# Patient Record
Sex: MALE | Race: BLACK OR AFRICAN AMERICAN
[De-identification: names, ages, dates, MRNs, and addresses within clinical notes are randomized per-mention and may not be internally consistent; named-entity substitution may affect disease eponyms.]

---

## 2017-05-15 ENCOUNTER — HOSPITAL ENCOUNTER (OUTPATIENT)
Dept: HOSPITAL 75 - RAD | Age: 37
End: 2017-05-15
Attending: NURSE PRACTITIONER
Payer: COMMERCIAL

## 2017-05-15 DIAGNOSIS — R05: Primary | ICD-10-CM

## 2017-05-15 DIAGNOSIS — R06.00: ICD-10-CM

## 2017-05-15 PROCEDURE — 71020: CPT

## 2017-05-15 NOTE — DIAGNOSTIC IMAGING REPORT
INDICATION: Cough and shortness of breath x1 week.



Comparison with 8/8/2011.



FINDINGS: Examination of the chest in the PA and lateral

projections fails to reveal evidence of active parenchymal

pathology or pleural effusion. The cardiac silhouette is normal.



IMPRESSION:

1. Negative chest.

2. No significant change since previous exam.



Dictated by: 



  Dictated on workstation # [id number]

## 2017-10-03 ENCOUNTER — HOSPITAL ENCOUNTER (EMERGENCY)
Dept: HOSPITAL 75 - ER | Age: 37
Discharge: HOME | End: 2017-10-03
Payer: COMMERCIAL

## 2017-10-03 VITALS — WEIGHT: 210 LBS | HEIGHT: 73 IN | BODY MASS INDEX: 27.83 KG/M2

## 2017-10-03 VITALS — DIASTOLIC BLOOD PRESSURE: 97 MMHG | SYSTOLIC BLOOD PRESSURE: 127 MMHG

## 2017-10-03 PROCEDURE — 99284 EMERGENCY DEPT VISIT MOD MDM: CPT

## 2017-10-03 PROCEDURE — 96372 THER/PROPH/DIAG INJ SC/IM: CPT

## 2017-10-03 NOTE — ED UPPER EXTREMITY
General


Chief Complaint:  Upper Extremity


Stated Complaint:  ARTHRITIS FLARE UP-WRIST PAIN


Source:  patient





History of Present Illness


Time seen by provider:  02:05


Initial Comments


PT C/O "RHEUMATOID FLARE" 


C/O RIGHT ANTERIOR WRIST PAIN SINCE THURSDAY 09/28/17


PAIN WAS MILD THROUGH WEEKEND


USED A  YESTERDAY


PAIN MUCH WORSE SINCE NOON TODAY,BUT WAS ABLE TO WORK ALL DAY ( WORKS AT [employer] ) 


NO PARESTHESIAS OR MOTOR DEFICITS





PT STATES HE WAS DX WITH R.A. A YEAR AGO, AND TAKES MELOXICAM DAILY FOR SYMPTOM 

CONTROL, AND USUALLY KEEPS IT FAIRLY WELL CONTROLLED.


RHEUMATOLOGIST: DR. NELSON





Allergies and Home Medications


Allergies


Coded Allergies:  


     No Known Drug Allergies (Unverified , 3/24/11)





Constitutional:  no symptoms reported


Musculoskeletal:  see HPI


Psychiatric/Neurological:  No Symptoms Reported





Past Medical-Social-Family Hx


Patient Social History


Alcohol Use:  Denies Use


Recreational Drug Use:  Yes


Drug of Choice:  THC


Smoking Status:  Current Everyday Smoker (1/2 PPD)


Type Used:  Cigarettes


Recent Foreign Travel:  No


Contact w/Someone Who Travel:  No





Surgeries


History of Surgeries:  No





Respiratory


History of Respiratory Disorde:  No





Cardiovascular


History of Cardiac Disorders:  No





Neurological


History of Neurological Disord:  No





Genitourinary


History of Genitourinary Disor:  No





Gastrointestinal


History of Gastrointestinal Di:  No





Musculoskeletal


History of Musculoskeletal Dis:  Yes


Musculoskeletal Disorders:  Rheumatoid Arthritis





Endocrine


History of Endocrine Disorders:  No





HEENT


History of HEENT Disorders:  No





Cancer


History of Cancer:  No





Psychosocial


History of Psychiatric Problem:  No





Integumentary


History of Skin or Integumenta:  No





Blood Transfusions


History of Blood Disorders:  No





Physical Exam


Vital Signs


Capillary Refill :


General Appearance:  WD/WN, no apparent distress


Elbow/Forearm:  normal inspection, non-tender, no evidence of injury, normal ROM


Wrist:  No bone tenderness, Yes limited ROM, Yes pain, Yes soft tissue 

tenderness (RIGHT ANTERIOR WRIST), Yes swelling (SLIGHT)


Hand:  normal inspection, non-tender, no evidence of injury, normal ROM


Neurologic/Tendon:  normal sensation, normal motor functions, normal tendon 

functions


Neurologic/Psychiatric:  CNs II-XII nml as tested, no motor/sensory deficits, 

alert, normal mood/affect, oriented x 3


Skin:  normal color, warm/dry





Progress/Results/Core Measures


Results/Orders


My Orders





Orders - AUDREY WILBURN DO


Methylprednisolone Sod Succ (Solu-Medrol (10/3/17 02:15)








Departure


Impression


Impression:  


 Primary Impression:  


 Right wrist pain


 Additional Impression:  


 Rheumatoid arthritis flare


Disposition:  01 HOME, SELF-CARE


Condition:  Stable





Departure-Patient Inst.


Referrals:  


ANNY BLACK MD (PCP/Family)


Primary Care Physician


Patient Instructions:  Rheumatoid Arthritis (DC)





Add. Discharge Instructions:  


ALTERNATE ICE AND HEAT TO AREA AT 20 MINUTE INTERVALS





HOLD MELOXICAM WHILE TAKING PREDNISONE





FOLLOW UP WITH DR. NELSON IN 2-3 DAYS IF NO BETTER





All discharge instructions reviewed with patient and/or family. Voiced 

understanding.


Scripts


Methylprednisolone (Medrol) 4 Mg Tab.ds.pk


4 MG PO UD, #1 PKG


   Prov: AUDREY WILBURN DO         10/3/17











AUDREY WILBURN DO Oct 3, 2017 02:17

## 2018-01-26 ENCOUNTER — HOSPITAL ENCOUNTER (OUTPATIENT)
Dept: HOSPITAL 75 - RAD | Age: 38
End: 2018-01-26
Attending: NURSE PRACTITIONER
Payer: COMMERCIAL

## 2018-01-26 DIAGNOSIS — R11.0: ICD-10-CM

## 2018-01-26 DIAGNOSIS — R42: ICD-10-CM

## 2018-01-26 DIAGNOSIS — R51: Primary | ICD-10-CM

## 2018-01-26 DIAGNOSIS — R63.4: ICD-10-CM

## 2018-01-26 LAB
ALBUMIN SERPL-MCNC: 4.5 GM/DL (ref 3.2–4.5)
ALP SERPL-CCNC: 50 U/L (ref 40–136)
ALT SERPL-CCNC: 20 U/L (ref 0–55)
BILIRUB SERPL-MCNC: 0.7 MG/DL (ref 0.1–1)
BUN/CREAT SERPL: 13
CALCIUM SERPL-MCNC: 9.8 MG/DL (ref 8.5–10.1)
CHLORIDE SERPL-SCNC: 104 MMOL/L (ref 98–107)
CO2 SERPL-SCNC: 25 MMOL/L (ref 21–32)
CREAT SERPL-MCNC: 0.91 MG/DL (ref 0.6–1.3)
GFR SERPLBLD BASED ON 1.73 SQ M-ARVRAT: > 60 ML/MIN
GLUCOSE SERPL-MCNC: 98 MG/DL (ref 70–105)
POTASSIUM SERPL-SCNC: 4.4 MMOL/L (ref 3.6–5)
PROT SERPL-MCNC: 7.6 GM/DL (ref 6.4–8.2)
SODIUM SERPL-SCNC: 139 MMOL/L (ref 135–145)

## 2018-01-26 PROCEDURE — 80053 COMPREHEN METABOLIC PANEL: CPT

## 2018-01-26 PROCEDURE — 36415 COLL VENOUS BLD VENIPUNCTURE: CPT

## 2018-01-26 PROCEDURE — 70553 MRI BRAIN STEM W/O & W/DYE: CPT

## 2018-01-26 NOTE — DIAGNOSTIC IMAGING REPORT
CLINICAL INDICATION: Patient states he had a couple episodes of

headaches, nausea and dizziness for the last several days at a

time. Patient has joint pain and weight loss.



Exam: MRI of the brain performed without and with 9 cc of

Gadavist  IV contrast. Sequences include axial DWI, ADC map,

coronal gradient echo, axial T2, axial FLAIR, axial T1, axial T1

post IV contrast, coronal T1 fat-sat post IV contrast, and

sagittal T1 post IV contrast.



Comparison: MRI of the brain performed without and with IV

contrast dated 8/8/2011.



Findings:

There is no evidence of acute cerebral infarct, intracranial

hemorrhage, or gross mass effect. 



There is normal gray-white matter distinction. The brain

parenchymal volume appears appropriate for patient's age. There

is no significant midline shift or herniation. 



The [place] vascular structures show no gross

abnormality as visualized. The pituitary gland, sella, and

suprasellar regions are unremarkable as visualized. There is no

evidence of hydrocephalus. The basal cisterns are unremarkable.

The skull, extracranial soft tissue, and orbits are unremarkable.

The paranasal sinuses are unremarkable.



IMPRESSION:

Unremarkable MRI of the brain.



Dictated by: 



  Dictated on workstation # [id number]

## 2018-02-02 ENCOUNTER — HOSPITAL ENCOUNTER (EMERGENCY)
Dept: HOSPITAL 75 - ER | Age: 38
Discharge: HOME | End: 2018-02-02
Payer: COMMERCIAL

## 2018-02-02 VITALS — BODY MASS INDEX: 27.3 KG/M2 | WEIGHT: 206 LBS | HEIGHT: 73 IN

## 2018-02-02 VITALS — DIASTOLIC BLOOD PRESSURE: 75 MMHG | SYSTOLIC BLOOD PRESSURE: 111 MMHG

## 2018-02-02 DIAGNOSIS — R10.13: ICD-10-CM

## 2018-02-02 DIAGNOSIS — R11.0: Primary | ICD-10-CM

## 2018-02-02 DIAGNOSIS — R63.4: ICD-10-CM

## 2018-02-02 DIAGNOSIS — F12.10: ICD-10-CM

## 2018-02-02 DIAGNOSIS — F17.200: ICD-10-CM

## 2018-02-02 DIAGNOSIS — M06.9: ICD-10-CM

## 2018-02-02 LAB
ALBUMIN SERPL-MCNC: 4.1 GM/DL (ref 3.2–4.5)
ALP SERPL-CCNC: 47 U/L (ref 40–136)
ALT SERPL-CCNC: 19 U/L (ref 0–55)
APTT PPP: YELLOW S
BACTERIA #/AREA URNS HPF: (no result) /HPF
BASOPHILS # BLD AUTO: 0 10^3/UL (ref 0–0.1)
BASOPHILS NFR BLD AUTO: 0 % (ref 0–10)
BILIRUB SERPL-MCNC: 0.5 MG/DL (ref 0.1–1)
BILIRUB UR QL STRIP: NEGATIVE
BUN/CREAT SERPL: 12
CALCIUM SERPL-MCNC: 9.4 MG/DL (ref 8.5–10.1)
CHLORIDE SERPL-SCNC: 107 MMOL/L (ref 98–107)
CO2 SERPL-SCNC: 23 MMOL/L (ref 21–32)
CREAT SERPL-MCNC: 0.77 MG/DL (ref 0.6–1.3)
EOSINOPHIL # BLD AUTO: 0 10^3/UL (ref 0–0.3)
EOSINOPHIL NFR BLD AUTO: 1 % (ref 0–10)
ERYTHROCYTE [DISTWIDTH] IN BLOOD BY AUTOMATED COUNT: 14.1 % (ref 10–14.5)
ERYTHROCYTE [SEDIMENTATION RATE] IN BLOOD: 5 MM/HR (ref 0–15)
FIBRINOGEN PPP-MCNC: CLEAR MG/DL
GFR SERPLBLD BASED ON 1.73 SQ M-ARVRAT: > 60 ML/MIN
GLUCOSE SERPL-MCNC: 94 MG/DL (ref 70–105)
GLUCOSE UR STRIP-MCNC: NEGATIVE MG/DL
HCT VFR BLD CALC: 42 % (ref 40–54)
HGB BLD-MCNC: 14.6 G/DL (ref 13.3–17.7)
KETONES UR QL STRIP: NEGATIVE
LEUKOCYTE ESTERASE UR QL STRIP: NEGATIVE
LYMPHOCYTES # BLD AUTO: 1.3 X 10^3 (ref 1–4)
LYMPHOCYTES NFR BLD AUTO: 32 % (ref 12–44)
MANUAL DIFFERENTIAL PERFORMED BLD QL: NO
MCH RBC QN AUTO: 30 PG (ref 25–34)
MCHC RBC AUTO-ENTMCNC: 35 G/DL (ref 32–36)
MCV RBC AUTO: 86 FL (ref 80–99)
MONOCYTES # BLD AUTO: 0.4 X 10^3 (ref 0–1)
MONOCYTES NFR BLD AUTO: 8 % (ref 0–12)
NEUTROPHILS # BLD AUTO: 2.5 X 10^3 (ref 1.8–7.8)
NEUTROPHILS NFR BLD AUTO: 59 % (ref 42–75)
NITRITE UR QL STRIP: NEGATIVE
PH UR STRIP: 6.5 [PH] (ref 5–9)
PLATELET # BLD: 174 10^3/UL (ref 130–400)
PMV BLD AUTO: 10.3 FL (ref 7.4–10.4)
POTASSIUM SERPL-SCNC: 3.8 MMOL/L (ref 3.6–5)
PROT SERPL-MCNC: 7 GM/DL (ref 6.4–8.2)
PROT UR QL STRIP: NEGATIVE
RBC # BLD AUTO: 4.86 10^6/UL (ref 4.35–5.85)
RBC #/AREA URNS HPF: (no result) /HPF
SODIUM SERPL-SCNC: 138 MMOL/L (ref 135–145)
SP GR UR STRIP: 1 (ref 1.02–1.02)
SQUAMOUS #/AREA URNS HPF: (no result) /HPF
T4 FREE SERPL-MCNC: 0.91 NG/DL (ref 0.7–1.48)
UROBILINOGEN UR-MCNC: NORMAL MG/DL
WBC # BLD AUTO: 4.3 10^3/UL (ref 4.3–11)
WBC #/AREA URNS HPF: (no result) /HPF

## 2018-02-02 PROCEDURE — 74160 CT ABDOMEN W/CONTRAST: CPT

## 2018-02-02 PROCEDURE — 96361 HYDRATE IV INFUSION ADD-ON: CPT

## 2018-02-02 PROCEDURE — 80053 COMPREHEN METABOLIC PANEL: CPT

## 2018-02-02 PROCEDURE — 81000 URINALYSIS NONAUTO W/SCOPE: CPT

## 2018-02-02 PROCEDURE — 96374 THER/PROPH/DIAG INJ IV PUSH: CPT

## 2018-02-02 PROCEDURE — 70491 CT SOFT TISSUE NECK W/DYE: CPT

## 2018-02-02 PROCEDURE — 86141 C-REACTIVE PROTEIN HS: CPT

## 2018-02-02 PROCEDURE — 84443 ASSAY THYROID STIM HORMONE: CPT

## 2018-02-02 PROCEDURE — 84439 ASSAY OF FREE THYROXINE: CPT

## 2018-02-02 PROCEDURE — 36415 COLL VENOUS BLD VENIPUNCTURE: CPT

## 2018-02-02 PROCEDURE — 85025 COMPLETE CBC W/AUTO DIFF WBC: CPT

## 2018-02-02 PROCEDURE — 71260 CT THORAX DX C+: CPT

## 2018-02-02 PROCEDURE — 85652 RBC SED RATE AUTOMATED: CPT

## 2018-02-02 NOTE — ED ABDOMINAL PAIN
General


Chief Complaint:  Abdominal/GI Problems


Stated Complaint:  LOSS OF APPETITE/THROAT DISCOMFORT


Nursing Triage Note:  


PT CO OF NO APPETITE, NAUSEA X3 WEEKS,FELT LIKE HAS HAD SOMETHING IN THROAT FOR 


2 DAYS, HAS LOST 20# SINCE OCT AND IS NOT TRYING TO LOOSE WT


Sepsis Screen:  No Definite Risk


Source of Information:  Patient


Exam Limitations:  No Limitations (ligated joining)





History of Present Illness


Date Seen by Provider:  2018


Time Seen by Provider:  19:27


Initial Comments


To ER by mother with reports of an intentional weight loss since October 

totaling about 20 pounds. Nausea and epigastric fullness for 3 weeks. Sensation 

of some things stuck in his throat for about 2 days. Denies fevers. Denies 

bowel changes. Does report "tightness" in his upper abdomen. States he was 

recently diagnosed with rheumatoid arthritis about 3 days ago following 

evaluation for joint pains. He has not yet started any treatment for this 

because the medicine that they prescribed had a side effect listed of nausea. 

She was are dealing with nausea he didn't want add to that. Has been on 

meloxicam daily for over a year until about 2-3 weeks ago he stopped it.


Timing/Duration:  Getting Worse, Other


Severity/Quality:  Moderate


Radiation:  No Radiation


Activities at Onset:  None


Associated Symptoms:  Nausea/Vomiting





Allergies and Home Medications


Allergies


Coded Allergies:  


     No Known Drug Allergies (Unverified , 3/24/11)





Review of Systems


Constitutional:  see HPI


EENTM:  No Symptoms Reported


Respiratory:  No Symptoms Reported


Cardiovascular:  No Symptoms Reported


Gastrointestinal:  See HPI, Abdominal Pain, Nausea


Genitourinary:  No Symptoms Reported


Musculoskeletal:  no symptoms reported


Skin:  no symptoms reported


Psychiatric/Neurological:  No Symptoms Reported





Past Medical-Social-Family Hx


Patient Social History


Alcohol Use:  Denies Use


Recreational Drug Use:  Yes


Drug of Choice:  THC


Type Used:  Cigarettes


Recent Foreign Travel:  No


Contact w/Someone Who Travel:  No


Recent Infectious Disease Expo:  No


Recent Hopitalizations:  No


Physical Abuse:  No


Sexual Abuse:  No





Surgeries


History of Surgeries:  No





Respiratory


History of Respiratory Disorde:  No





Cardiovascular


History of Cardiac Disorders:  No





Neurological


History of Neurological Disord:  No





Genitourinary


History of Genitourinary Disor:  No





Gastrointestinal


History of Gastrointestinal Di:  No





Musculoskeletal


History of Musculoskeletal Dis:  Yes


Musculoskeletal Disorders:  Rheumatoid Arthritis





Endocrine


History of Endocrine Disorders:  No





HEENT


History of HEENT Disorders:  No





Cancer


History of Cancer:  No





Psychosocial


History of Psychiatric Problem:  No


Suicide Risk Score:  0





Integumentary


History of Skin or Integumenta:  No





Blood Transfusions


History of Blood Disorders:  No





Physical Exam


Vital Signs





 VS - Last 72 Hours, by Label








 18





 18:45


 


Temp 97.7


 


Pulse 98


 


Resp 18


 


B/P (MAP) 135/90 (105)


 


Pulse Ox 98





Capillary Refill : Less Than 3 Seconds


General Appearance:  WD/WN, no apparent distress


HEENT:  PERRL/EOMI, normal ENT inspection


Neck:  non-tender, full range of motion


Respiratory:  no respiratory distress, no accessory muscle use


Cardiovascular:  regular rate, rhythm, no murmur


Gastrointestinal:  normal bowel sounds, non tender, soft


Neurologic/Psychiatric:  alert, normal mood/affect, oriented x 3


Skin:  normal color, warm/dry





Progress/Results/Core Measures


Results/Orders


Lab Results





Laboratory Tests








Test


  18


20:36 18


20:55 Range/Units


 


 


White Blood Count


  4.3 


  


  4.3-11.0


10^3/uL


 


Red Blood Count


  4.86 


  


  4.35-5.85


10^6/uL


 


Hemoglobin 14.6   13.3-17.7  G/DL


 


Hematocrit 42   40-54  %


 


Mean Corpuscular Volume 86   80-99  FL


 


Mean Corpuscular Hemoglobin 30   25-34  PG


 


Mean Corpuscular Hemoglobin


Concent 35 


  


  32-36  G/DL


 


 


Red Cell Distribution Width 14.1   10.0-14.5  %


 


Platelet Count


  174 


  


  130-400


10^3/uL


 


Mean Platelet Volume 10.3   7.4-10.4  FL


 


Neutrophils (%) (Auto) 59   42-75  %


 


Lymphocytes (%) (Auto) 32   12-44  %


 


Monocytes (%) (Auto) 8   0-12  %


 


Eosinophils (%) (Auto) 1   0-10  %


 


Basophils (%) (Auto) 0   0-10  %


 


Neutrophils # (Auto) 2.5   1.8-7.8  X 10^3


 


Lymphocytes # (Auto) 1.3   1.0-4.0  X 10^3


 


Monocytes # (Auto) 0.4   0.0-1.0  X 10^3


 


Eosinophils # (Auto)


  0.0 


  


  0.0-0.3


10^3/uL


 


Basophils # (Auto)


  0.0 


  


  0.0-0.1


10^3/uL


 


Sodium Level 138   135-145  MMOL/L


 


Potassium Level 3.8   3.6-5.0  MMOL/L


 


Chloride Level 107     MMOL/L


 


Carbon Dioxide Level 23   21-32  MMOL/L


 


Anion Gap 8   5-14  MMOL/L


 


Blood Urea Nitrogen 9   7-18  MG/DL


 


Creatinine


  0.77 


  


  0.60-1.30


MG/DL


 


Estimat Glomerular Filtration


Rate > 60 


  


   


 


 


BUN/Creatinine Ratio 12    


 


Glucose Level 94     MG/DL


 


Calcium Level 9.4   8.5-10.1  MG/DL


 


Total Bilirubin 0.5   0.1-1.0  MG/DL


 


Aspartate Amino Transf


(AST/SGOT) 15 


  


  5-34  U/L


 


 


Alanine Aminotransferase


(ALT/SGPT) 19 


  


  0-55  U/L


 


 


Alkaline Phosphatase 47     U/L


 


C-Reactive Protein High


Sensitivity 0.30 


  


  0.00-0.50


MG/DL


 


Total Protein 7.0   6.4-8.2  GM/DL


 


Albumin 4.1   3.2-4.5  GM/DL


 


Thyroid Stimulating Hormone


(TSH) 0.89 


  


  0.35-4.94


UIU/ML


 


Free Thyroxine


  0.91 


  


  0.70-1.48


NG/DL


 


Urine Color  YELLOW   


 


Urine Clarity  CLEAR   


 


Urine pH  6.5  5-9  


 


Urine Specific Gravity  1.005 L 1.016-1.022  


 


Urine Protein  NEGATIVE  NEGATIVE  


 


Urine Glucose (UA)  NEGATIVE  NEGATIVE  


 


Urine Ketones  NEGATIVE  NEGATIVE  


 


Urine Nitrite  NEGATIVE  NEGATIVE  


 


Urine Bilirubin  NEGATIVE  NEGATIVE  


 


Urine Urobilinogen  NORMAL  NORMAL  MG/DL


 


Urine Leukocyte Esterase  NEGATIVE  NEGATIVE  


 


Urine RBC (Auto)  NEGATIVE  NEGATIVE  


 


Urine RBC  NONE   /HPF


 


Urine WBC  NONE   /HPF


 


Urine Squamous Epithelial


Cells 


  RARE 


   /HPF


 


 


Urine Crystals  NONE   /LPF


 


Urine Bacteria  NONE   /HPF


 


Urine Casts  NONE   /LPF


 


Urine Mucus  NEGATIVE   /LPF


 


Urine Culture Indicated  NO   








My Orders





Orders - CLIFTON HALE


Influenza A And B Antigens (18 18:46)


Cbc With Automated Diff (18 19:03)


Comprehensive Metabolic Panel (18 19:03)


Ua Culture If Indicated (18 19:03)


Saline Lock/Iv-Start (18 19:03)


Thyroid Stimulating Hormone (18 19:03)


Free T4 (Free Thyroxine) (18 19:03)


Ns Iv 1000 Ml (Sodium Chloride 0.9%) (18 19:15)


Erythrocyte Sedimentation Rate (18 19:30)


Hs C Reactive Protein (18 19:30)


Ondansetron Injection (Zofran Injectio (18 19:30)


Iohexol Injection (Omnipaque 350 Mg/Ml 1 (18 19:45)


Pharmacy Communication (Pharmacy Communi (18 19:31)


Ns (Ivpb) (Sodium Chloride 0.9%) (18 19:45)


Ct Neck/Chest/Abdomen W (18 19:26)


Antacid  Suspension (Mylanta  Suspension (18 21:30)


Lidocaine 2% Viscous 15 Ml (Xylocaine Vi (18 21:30)





Medications Given in ED





Current Medications








 Medications  Dose


 Ordered  Sig/Jessica


 Route  Start Time


 Stop Time Status Last Admin


Dose Admin


 


 Iohexol  100 ml  ONCE  ONCE


 IV  18 19:45


 18 19:46 DC 18 20:45


100 ML


 


 Ondansetron HCl  4 mg  ONCE  ONCE


 IVP  18 19:30


 18 19:31 DC 18 20:29


4 MG


 


 Sodium Chloride  250 ml  ONCE  ONCE


 IV  18 19:45


 18 19:46 DC 18 20:45


80 ML








Vital Signs/I&O





Vital Sign - Last 12Hours








 18





 18:45


 


Temp 97.7


 


Pulse 98


 


Resp 18


 


B/P (MAP) 135/90 (105)


 


Pulse Ox 98














Blood Pressure Mean:  105











Diagnostic Imaging





   Diagonstic Imaging:  CT


Comments


NAME:   KEV LEPE


[facility] REC#:   [id number]


ACCOUNT#:   [id number]


PT STATUS:   REG ER


:   1980


PHYSICIAN:   CLIFTON HALE APRN


ADMIT DATE:   18/ER


 ***Draft***


Date of Exam:18





CT NECK/CHEST/ABDOMEN W








INDICATION: Nausea for 3 weeks with dysphagia and weight loss





Multiple contiguous axial CT images of the neck, chest and


abdomen are obtained after intravenous administration of


iodinated contrast.





CT neck:





Great vessels in the neck appear patent. There are subcentimeter


cervical lymph nodes which are distributed symmetrically,


bilaterally. No evidence of dominant mass or fluid collection is


seen. Parotid and submandibular salivary glands have a normal


appearance as does the thyroid gland.





IMPRESSION: No neck abnormality is appreciated.





CT thorax:





Lungs are clear, bilaterally. There is no evidence of mass or


infiltrate. No significant pleural or pericardial fluid is


identified. No pathologic adenopathy is seen in the thorax. No


osseous abnormality is detected.





IMPRESSION: Unremarkable CT of the thorax.





CT abdomen:





There is no evidence of focal hepatic or splenic abnormality.


Pancreas, adrenal glands and kidneys are unremarkable. No


gallbladder or renal abnormality is seen. There is no free fluid


or evidence of pathologic adenopathy. Minimal atherosclerotic


disease is present.





IMPRESSION: No acute abnormalities identified and there is no


evidence of abdominal mass.





  Dictated on workstation # [id number]








Dict:   [id number]


Trans:   18


IBETH [id number]





Interpreted by:     LEVY MAHONEY MD


Electronically signed by:





Departure


Communication (Admissions)


Progress Notes


-patient is feeling somewhat better at this time, no longer nauseous. He 

does suggest that may be the discomfort or annoyance rather in his throat is a 

factor of a pill which he felt as though got stuck. He took one of his Prilosec 

tablets and thinks he might not of had enough water with it and it may of sat 

there in his throat for a while. We will try GI cocktail. His stomach 

discomfort may be from meloxicam induced gastritis/peptic ulcer disease and 

weight loss may be a factor of the rheumatoid arthritis recently diagnosed.





Impression


Impression:  


 Primary Impression:  


 Nausea alone


 Additional Impressions:  


 Epigastric discomfort


 Weight loss


Disposition:  01 HOME, SELF-CARE


Condition:  Stable





Departure-Patient Inst.


Decision time for Depature:  21:28


Referrals:  


ANNY BLACK MD (PCP/Family)


Primary Care Physician





Add. Discharge Instructions:  


1. Start the Prilosec and the hydroxychloroquine


2. Return to ER for worsening symptoms


3. Follow-up with Dr. Black next week.





Copy


Copies To 1:   ANNY BLACK MD, PETER J APRN 2018 19:30

## 2018-02-02 NOTE — DIAGNOSTIC IMAGING REPORT
INDICATION: Nausea for 3 weeks with dysphagia and weight loss



Multiple contiguous axial CT images of the neck, chest and

abdomen are obtained after intravenous administration of

iodinated contrast.



CT neck:



Great vessels in the neck appear patent. There are subcentimeter

cervical lymph nodes which are distributed symmetrically,

bilaterally. No evidence of dominant mass or fluid collection is

seen. Parotid and submandibular salivary glands have a normal

appearance as does the thyroid gland.



IMPRESSION: No neck abnormality is appreciated.



CT thorax:



Lungs are clear, bilaterally. There is no evidence of mass or

infiltrate. No significant pleural or pericardial fluid is

identified. No pathologic adenopathy is seen in the thorax. No

osseous abnormality is detected.



IMPRESSION: Unremarkable CT of the thorax.



CT abdomen:



There is no evidence of focal hepatic or splenic abnormality.

Pancreas, adrenal glands and kidneys are unremarkable. No

gallbladder or renal abnormality is seen. There is no free fluid

or evidence of pathologic adenopathy. Minimal atherosclerotic

disease is present.



IMPRESSION: No acute abnormalities identified and there is no

evidence of abdominal mass.



Dictated by: 



  Dictated on workstation # [id number]

## 2018-02-27 ENCOUNTER — HOSPITAL ENCOUNTER (OUTPATIENT)
Dept: HOSPITAL 75 - PREOP | Age: 38
End: 2018-02-27
Attending: SURGERY
Payer: COMMERCIAL

## 2018-02-27 DIAGNOSIS — K21.9: ICD-10-CM

## 2018-02-27 DIAGNOSIS — Z01.818: Primary | ICD-10-CM

## 2018-03-02 ENCOUNTER — HOSPITAL ENCOUNTER (EMERGENCY)
Dept: HOSPITAL 75 - ER | Age: 38
Discharge: HOME | End: 2018-03-02
Payer: COMMERCIAL

## 2018-03-02 VITALS — BODY MASS INDEX: 27.04 KG/M2 | HEIGHT: 73 IN | WEIGHT: 204 LBS

## 2018-03-02 VITALS — DIASTOLIC BLOOD PRESSURE: 79 MMHG | SYSTOLIC BLOOD PRESSURE: 118 MMHG

## 2018-03-02 DIAGNOSIS — R10.13: Primary | ICD-10-CM

## 2018-03-02 DIAGNOSIS — M06.9: ICD-10-CM

## 2018-03-02 DIAGNOSIS — F12.90: ICD-10-CM

## 2018-03-02 LAB
ALBUMIN SERPL-MCNC: 4.5 GM/DL (ref 3.2–4.5)
ALP SERPL-CCNC: 44 U/L (ref 40–136)
ALT SERPL-CCNC: 20 U/L (ref 0–55)
APTT PPP: YELLOW S
BACTERIA #/AREA URNS HPF: (no result) /HPF
BASOPHILS # BLD AUTO: 0 10^3/UL (ref 0–0.1)
BASOPHILS NFR BLD AUTO: 0 % (ref 0–10)
BILIRUB SERPL-MCNC: 0.5 MG/DL (ref 0.1–1)
BILIRUB UR QL STRIP: NEGATIVE
BUN/CREAT SERPL: 12
CALCIUM SERPL-MCNC: 9.8 MG/DL (ref 8.5–10.1)
CHLORIDE SERPL-SCNC: 108 MMOL/L (ref 98–107)
CO2 SERPL-SCNC: 22 MMOL/L (ref 21–32)
CREAT SERPL-MCNC: 0.77 MG/DL (ref 0.6–1.3)
EOSINOPHIL # BLD AUTO: 0 10^3/UL (ref 0–0.3)
EOSINOPHIL NFR BLD AUTO: 1 % (ref 0–10)
ERYTHROCYTE [DISTWIDTH] IN BLOOD BY AUTOMATED COUNT: 13.7 % (ref 10–14.5)
FIBRINOGEN PPP-MCNC: CLEAR MG/DL
GFR SERPLBLD BASED ON 1.73 SQ M-ARVRAT: > 60 ML/MIN
GLUCOSE SERPL-MCNC: 89 MG/DL (ref 70–105)
GLUCOSE UR STRIP-MCNC: NEGATIVE MG/DL
HCT VFR BLD CALC: 46 % (ref 40–54)
HGB BLD-MCNC: 16.1 G/DL (ref 13.3–17.7)
KETONES UR QL STRIP: NEGATIVE
LEUKOCYTE ESTERASE UR QL STRIP: NEGATIVE
LIPASE SERPL-CCNC: 54 U/L (ref 8–78)
LYMPHOCYTES # BLD AUTO: 1.7 X 10^3 (ref 1–4)
LYMPHOCYTES NFR BLD AUTO: 36 % (ref 12–44)
MANUAL DIFFERENTIAL PERFORMED BLD QL: NO
MCH RBC QN AUTO: 30 PG (ref 25–34)
MCHC RBC AUTO-ENTMCNC: 35 G/DL (ref 32–36)
MCV RBC AUTO: 85 FL (ref 80–99)
MONOCYTES # BLD AUTO: 0.4 X 10^3 (ref 0–1)
MONOCYTES NFR BLD AUTO: 9 % (ref 0–12)
NEUTROPHILS # BLD AUTO: 2.4 X 10^3 (ref 1.8–7.8)
NEUTROPHILS NFR BLD AUTO: 54 % (ref 42–75)
NITRITE UR QL STRIP: NEGATIVE
PH UR STRIP: 6.5 [PH] (ref 5–9)
PLATELET # BLD: 179 10^3/UL (ref 130–400)
PMV BLD AUTO: 9.6 FL (ref 7.4–10.4)
POTASSIUM SERPL-SCNC: 3.9 MMOL/L (ref 3.6–5)
PROT SERPL-MCNC: 7.3 GM/DL (ref 6.4–8.2)
PROT UR QL STRIP: NEGATIVE
RBC # BLD AUTO: 5.38 10^6/UL (ref 4.35–5.85)
RBC #/AREA URNS HPF: (no result) /HPF
SODIUM SERPL-SCNC: 141 MMOL/L (ref 135–145)
SP GR UR STRIP: 1 (ref 1.02–1.02)
SQUAMOUS #/AREA URNS HPF: (no result) /HPF
UROBILINOGEN UR-MCNC: NORMAL MG/DL
WBC # BLD AUTO: 4.5 10^3/UL (ref 4.3–11)
WBC #/AREA URNS HPF: (no result) /HPF

## 2018-03-02 PROCEDURE — 36415 COLL VENOUS BLD VENIPUNCTURE: CPT

## 2018-03-02 PROCEDURE — 85025 COMPLETE CBC W/AUTO DIFF WBC: CPT

## 2018-03-02 PROCEDURE — 76705 ECHO EXAM OF ABDOMEN: CPT

## 2018-03-02 PROCEDURE — 81000 URINALYSIS NONAUTO W/SCOPE: CPT

## 2018-03-02 PROCEDURE — 80053 COMPREHEN METABOLIC PANEL: CPT

## 2018-03-02 PROCEDURE — 96360 HYDRATION IV INFUSION INIT: CPT

## 2018-03-02 PROCEDURE — 83690 ASSAY OF LIPASE: CPT

## 2018-03-02 NOTE — ED ABDOMINAL PAIN
General


Chief Complaint:  Abdominal/GI Problems


Stated Complaint:  N/V


Nursing Triage Note:  


pt complaint of nausea x 7-10 weeks. pt states seen multiple doctors for this. 


pt now taking meds for his RA and nausea


Sepsis Screen:  No Definite Risk


Source of Information:  Patient


Exam Limitations:  No Limitations





History of Present Illness


Date Seen by Provider:  Mar 2, 2018


Time Seen by Provider:  15:16


Initial Comments


This 37-year-old male presents with a complaint of a 60 pound weight loss and 

persistent nausea vomiting diarrhea and abdominal pain for the last 9 months.  

The patient denies any hematemesis or black or tarry stools.  He has been on 

ulcer medicine without improvement.  He has recently diagnosed rheumatoid 

arthritis for which she was on meloxicam and subsequently Plaquenil.  Patient's 

joint pains have decreased.  His abdominal complaints continue.





The patient is under the care of Dr. Huber.  He is scheduled to see Dr. Mendes for an EGD early next week.





Allergies and Home Medications


Allergies


Coded Allergies:  


     No Known Drug Allergies (Unverified , 3/24/11)





Patient Home Medication List


Home Medication List Reviewed:  Yes





Review of Systems


Constitutional:  No chills


EENTM:  No Blurred Vision


Respiratory:  Denies Cough


Cardiovascular:  Denies Chest Pain


Gastrointestinal:  Abdominal Pain, Diarrhea, Nausea, Denies Rectal Bleeding, 

Vomiting


Genitourinary:  No Symptoms Reported


Musculoskeletal:  no symptoms reported


Skin:  no symptoms reported


Psychiatric/Neurological:  No Symptoms Reported


Endocrine:  No Symptoms Reported


Hematologic/Lymphatic:  No Symptoms Reported





Past Medical-Social-Family Hx


Patient Social History


Drug of Choice:  THC


Type Used:  Cigarettes


Recent Foreign Travel:  No


Contact w/Someone Who Travel:  No


Recent Infectious Disease Expo:  No


Recent Hopitalizations:  No





Surgeries


History of Surgeries:  No





Respiratory


History of Respiratory Disorde:  No





Cardiovascular


History of Cardiac Disorders:  No





Neurological


History of Neurological Disord:  No





Genitourinary


History of Genitourinary Disor:  No





Gastrointestinal


History of Gastrointestinal Di:  No





Musculoskeletal


History of Musculoskeletal Dis:  Yes


Musculoskeletal Disorders:  Rheumatoid Arthritis





Endocrine


History of Endocrine Disorders:  No





HEENT


History of HEENT Disorders:  No





Cancer


History of Cancer:  No





Psychosocial


History of Psychiatric Problem:  No





Integumentary


History of Skin or Integumenta:  No





Blood Transfusions


History of Blood Disorders:  No





Reviewed Nursing Assessment


Reviewed/Agree w Nursing PMH:  Yes





Physical Exam


Vital Signs





 VS - Last 72 Hours, by Label








 3/2/18





 14:41


 


Temp 97.9


 


Pulse 76


 


Resp 20


 


B/P (MAP) 151/95 (113)


 


Pulse Ox 100


 


O2 Delivery Room Air





Capillary Refill : Less Than 3 Seconds


General Appearance:  WD/WN, no apparent distress


Neck:  full range of motion


Respiratory:  lungs clear, normal breath sounds


Cardiovascular:  normal peripheral pulses, regular rate, rhythm


Gastrointestinal:  normal bowel sounds, non tender, soft


Extremities:  normal range of motion, non-tender, normal inspection


Back:  normal inspection


Neurologic/Psychiatric:  no motor/sensory deficits, alert, normal mood/affect


Skin:  normal color, warm/dry





Progress/Results/Core Measures


Results/Orders


Lab Results





Laboratory Tests








Test


  3/2/18


17:00 3/2/18


17:25 Range/Units


 


 


White Blood Count


  4.5 


  


  4.3-11.0


10^3/uL


 


Red Blood Count


  5.38 


  


  4.35-5.85


10^6/uL


 


Hemoglobin 16.1   13.3-17.7  G/DL


 


Hematocrit 46   40-54  %


 


Mean Corpuscular Volume 85   80-99  FL


 


Mean Corpuscular Hemoglobin 30   25-34  PG


 


Mean Corpuscular Hemoglobin


Concent 35 


  


  32-36  G/DL


 


 


Red Cell Distribution Width 13.7   10.0-14.5  %


 


Platelet Count


  179 


  


  130-400


10^3/uL


 


Mean Platelet Volume 9.6   7.4-10.4  FL


 


Neutrophils (%) (Auto) 54   42-75  %


 


Lymphocytes (%) (Auto) 36   12-44  %


 


Monocytes (%) (Auto) 9   0-12  %


 


Eosinophils (%) (Auto) 1   0-10  %


 


Basophils (%) (Auto) 0   0-10  %


 


Neutrophils # (Auto) 2.4   1.8-7.8  X 10^3


 


Lymphocytes # (Auto) 1.7   1.0-4.0  X 10^3


 


Monocytes # (Auto) 0.4   0.0-1.0  X 10^3


 


Eosinophils # (Auto)


  0.0 


  


  0.0-0.3


10^3/uL


 


Basophils # (Auto)


  0.0 


  


  0.0-0.1


10^3/uL


 


Sodium Level 141   135-145  MMOL/L


 


Potassium Level 3.9   3.6-5.0  MMOL/L


 


Chloride Level 108 H    MMOL/L


 


Carbon Dioxide Level 22   21-32  MMOL/L


 


Anion Gap 11   5-14  MMOL/L


 


Blood Urea Nitrogen 9   7-18  MG/DL


 


Creatinine


  0.77 


  


  0.60-1.30


MG/DL


 


Estimat Glomerular Filtration


Rate > 60 


  


   


 


 


BUN/Creatinine Ratio 12    


 


Glucose Level 89     MG/DL


 


Calcium Level 9.8   8.5-10.1  MG/DL


 


Total Bilirubin 0.5   0.1-1.0  MG/DL


 


Aspartate Amino Transf


(AST/SGOT) 15 


  


  5-34  U/L


 


 


Alanine Aminotransferase


(ALT/SGPT) 20 


  


  0-55  U/L


 


 


Alkaline Phosphatase 44     U/L


 


Total Protein 7.3   6.4-8.2  GM/DL


 


Albumin 4.5   3.2-4.5  GM/DL


 


Lipase 54   8-78  U/L


 


Urine Color  YELLOW   


 


Urine Clarity  CLEAR   


 


Urine pH  6.5  5-9  


 


Urine Specific Gravity  1.005 L 1.016-1.022  


 


Urine Protein  NEGATIVE  NEGATIVE  


 


Urine Glucose (UA)  NEGATIVE  NEGATIVE  


 


Urine Ketones  NEGATIVE  NEGATIVE  


 


Urine Nitrite  NEGATIVE  NEGATIVE  


 


Urine Bilirubin  NEGATIVE  NEGATIVE  


 


Urine Urobilinogen  NORMAL  NORMAL  MG/DL


 


Urine Leukocyte Esterase  NEGATIVE  NEGATIVE  


 


Urine RBC (Auto)  NEGATIVE  NEGATIVE  


 


Urine RBC  NONE   /HPF


 


Urine WBC  NONE   /HPF


 


Urine Squamous Epithelial


Cells 


  RARE 


   /HPF


 


 


Urine Crystals  NONE   /LPF


 


Urine Bacteria  NONE   /HPF


 


Urine Casts  NONE   /LPF


 


Urine Mucus  NEGATIVE   /LPF


 


Urine Culture Indicated  NO   








My Orders





Orders - CEE PEDRAZA MD


Cbc With Automated Diff (3/2/18 16:24)


Comprehensive Metabolic Panel (3/2/18 16:24)


Ua Culture If Indicated (3/2/18 16:24)


Ns Iv 1000 Ml (Sodium Chloride 0.9%) (3/2/18 16:30)


Lipase (3/2/18 16:24)


Us Gallbladder [id number] (3/2/18 17:02)





Vital Signs/I&O





Vital Sign - Last 12Hours








 3/2/18





 14:41


 


Temp 97.9


 


Pulse 76


 


Resp 20


 


B/P (MAP) 151/95 (113)


 


Pulse Ox 100


 


O2 Delivery Room Air














Blood Pressure Mean:  113








Progress Note :  


   Time:  16:47


Progress Note


I visited with Abhijit Mendes.  Dr. Mendes was kind enough to present 

to the emergency department to evaluate the patient.





Patient's ultrasound of gallbladder was unremarkable.  Patient's laboratory 

evaluation including amylase was similarly benign.





Dr. Mendes arranged for the patient to the follow-up closely on Tuesday.  We 

will initiate Protonix and Carafate in the interim for the patient.





Departure


Impression


Impression:  


 Primary Impression:  


 Abdominal pain


 Qualified Codes:  R10.13 - Epigastric pain


Disposition:  09 ADMITTED AS INPATIENT


Condition:  Unchanged





Admissions


Decision to Admit Reason:  Admit from ER (General)


Decision to Admit/Date:  Mar 2, 2018


Time/Decision to Admit Time:  18:53





Departure-Patient Inst.


Decision time for Depature:  18:53


Referrals:  


ANNY BLACK MD (PCP/Family)


Primary Care Physician








DARRELL MENDES DO


Patient Instructions:  Acute Abdomen (Belly Pain), Adult (DC)





Add. Discharge Instructions:  


Carafate and Protonix as prescribed.  Close follow up with Abhijit BLACK and 

Isidra.  Come back for any problems or questions.  All discharge instructions 

reviewed with patient and/or family. Voiced understanding.











CEE PEDRAZA MD Mar 2, 2018 15:16

## 2018-03-02 NOTE — DIAGNOSTIC IMAGING REPORT
CLINICAL INDICATION: Patient with vomiting and nausea.



EXAM: Right upper quadrant ultrasound.



COMPARISON: CT scan of the neck, chest, abdomen, and pelvis dated

02/02/2018.



FINDINGS:

There is limited visualization of the intra-abdominal structures

due to patient body habitus and overlying bowel gas.



The visualized portions of the pancreatic tail and head are

significantly obscured. The pancreas is not well visualized. The

liver has normal echogenicity and echotexture. The liver measures

15.5 cm in craniocaudal dimension. There is no liver mass seen.

The main portal vein demonstrates hepatopetal flow. There is no

intrahepatic or extrahepatic ductal dilation. The common hepatic

duct measures grossly 3.2 mm.



Gallbladder is unremarkable with no stones or sludge seen. There

is no significant gallbladder wall thickening. There is no

pericholecystic fluid. There is no sonographic Hayward's sign.

There is no abdominal ascites. The visualized portions of the IVC

are unremarkable. The right kidney is unremarkable with no

hydronephrosis or mass seen. The right kidney measures 9.2 cm in

craniocaudal dimension.



IMPRESSION:

1:  Incomplete visualization of the pancreas. If there is

clinical concern for pancreatic abnormality, serology tests may

better evaluate. 



2:  Otherwise, unremarkable right upper quadrant ultrasound.



Dictated by: 



  Dictated on workstation # [id number]

## 2018-03-02 NOTE — CONSULTATION
History of Present Illness


History of Present Illness


Patient Consulted On(narciso/time)


3/2/18


 20:51


Date Seen by Provider:  Mar 2, 2018


Time Seen by Provider:  17:00


History of Present Illness


consult requested  by Dr. Kuo for nausea and abdominal pain.





Seen and evaluated in emergency dept.





Patient is a 37 year old male known to me.  He has had significant nausea, 

vomiting and abdominal discomfort.  Nothing making it better.  Nothing seems to 

make it worse at this time. 


He has lost weight.  No hematemesis. He was previously on Meloxicam and was 

recently taken off. He has tried Prilosec which isn't helping much now.  The 

abdominal discomfort is epigastric to left upper quadrant, which is moderate.


Some diarrhea, no blood in stools.  He is scheduled for EGD on Tuesday.





Allergies and Home Medications


Allergies


Coded Allergies:  


     No Known Drug Allergies (Unverified , 3/24/11)





Patient Home Medication List


Home Medication List Reviewed:  Yes





Past Medical-Social-Family Hx


Patient Social History


Alcohol Use:  Denies Use


Recreational Drug Use:  No


Drug of Choice:  THC


Type Used:  Cigarettes


Recent Foreign Travel:  No


Contact w/Someone Who Travel:  No


Recent Infectious Disease Expo:  No


Recent Hopitalizations:  No





Surgeries


History of Surgeries:  No





Respiratory


History of Respiratory Disorde:  No





Cardiovascular


History of Cardiac Disorders:  No





Neurological


History of Neurological Disord:  No





Genitourinary


History of Genitourinary Disor:  No





Gastrointestinal


History of Gastrointestinal Di:  No





Musculoskeletal


History of Musculoskeletal Dis:  Yes


Musculoskeletal Disorders:  Rheumatoid Arthritis





Endocrine


History of Endocrine Disorders:  No





HEENT


History of HEENT Disorders:  No





Cancer


History of Cancer:  No





Psychosocial


History of Psychiatric Problem:  No





Integumentary


History of Skin or Integumenta:  No





Blood Transfusions


History of Blood Disorders:  No





Reviewed Nursing Assessment


Reviewed/Agree w Nursing PMH:  Yes





Family Medical History


Significant Family History:  No Pertinent Family Hx





Review of Systems-General


Constitutional:  no symptoms reported


EENTM:  no symptoms reported


Respiratory:  no symptoms reported


Cardiovascular:  no symptoms reported


Gastrointestinal:  see HPI


Genitourinary:  no symptoms reported


Musculoskeletal:  no symptoms reported


Skin:  no symptoms reported


Psychiatric/Neurological:  No Symptoms Reported


All Other Systems Reviewed


Negative Unless Noted:  Yes (Negative excepted noted.)





Physical Exam-General Problems


Physical Exam


Vital Signs





Vital Signs - First Documented








 3/2/18





 14:41


 


Temp 97.9


 


Pulse 76


 


Resp 20


 


B/P (MAP) 151/95 (113)


 


Pulse Ox 100


 


O2 Delivery Room Air





Capillary Refill : Less Than 3 Seconds


General Appearance:  no apparent distress


HEENT:  PERRL/EOMI, normal ENT inspection


Neck:  supple


Respiratory:  no respiratory distress, no accessory muscle use


Cardiovascular:  regular rate, rhythm


Gastrointestinal:  soft (slight tenderness to palpation in epigastric region no 

guarding or rebounding), no organomegaly, no pulsatile mass


Back:  normal inspection


Extremities:  non-tender, normal inspection, no pedal edema


Neurologic/Psychiatric:  CNs II-XII nml as tested, no motor/sensory deficits, 

alert, normal mood/affect, oriented x 3


Skin:  warm/dry


Lymphatic:  no adenopathy





Data Review


Labs


Laboratory Tests


3/2/18 17:00: 


White Blood Count 4.5, Red Blood Count 5.38, Hemoglobin 16.1, Hematocrit 46, 

Mean Corpuscular Volume 85, Mean Corpuscular Hemoglobin 30, Mean Corpuscular 

Hemoglobin Concent 35, Red Cell Distribution Width 13.7, Platelet Count 179, 

Mean Platelet Volume 9.6, Neutrophils (%) (Auto) 54, Lymphocytes (%) (Auto) 36, 

Monocytes (%) (Auto) 9, Eosinophils (%) (Auto) 1, Basophils (%) (Auto) 0, 

Neutrophils # (Auto) 2.4, Lymphocytes # (Auto) 1.7, Monocytes # (Auto) 0.4, 

Eosinophils # (Auto) 0.0, Basophils # (Auto) 0.0, Sodium Level 141, Potassium 

Level 3.9, Chloride Level 108H, Carbon Dioxide Level 22, Anion Gap 11, Blood 

Urea Nitrogen 9, Creatinine 0.77, Estimat Glomerular Filtration Rate > 60, BUN/

Creatinine Ratio 12, Glucose Level 89, Calcium Level 9.8, Total Bilirubin 0.5, 

Aspartate Amino Transf (AST/SGOT) 15, Alanine Aminotransferase (ALT/SGPT) 20, 

Alkaline Phosphatase 44, Total Protein 7.3, Albumin 4.5, Lipase 54


3/2/18 17:25: 


Urine Color YELLOW, Urine Clarity CLEAR, Urine pH 6.5, Urine Specific Gravity 

1.005L, Urine Protein NEGATIVE, Urine Glucose (UA) NEGATIVE, Urine Ketones 

NEGATIVE, Urine Nitrite NEGATIVE, Urine Bilirubin NEGATIVE, Urine Urobilinogen 

NORMAL, Urine Leukocyte Esterase NEGATIVE, Urine RBC (Auto) NEGATIVE, Urine RBC 

NONE, Urine WBC NONE, Urine Squamous Epithelial Cells RARE, Urine Crystals NONE

, Urine Bacteria NONE, Urine Casts NONE, Urine Mucus NEGATIVE, Urine Culture 

Indicated NO





Assessment/Plan


epigastric abdominal pain


gastritis


nausea/vomiting








recommended u/s and gallbladder is normal


would start on protonix and carafate and conitnue plan of egd Tuesday


would also set up for HIDA scan to complete gallbladder workup


this can be done outpatient, but if worsens should be re-evaluated at that time.


patient and family agree with plan.











DARRELL MENDES DO Mar 2, 2018 21:00

## 2018-03-05 ENCOUNTER — HOSPITAL ENCOUNTER (OUTPATIENT)
Dept: HOSPITAL 75 - 4TH | Age: 38
LOS: 2 days | Discharge: HOME | End: 2018-03-07
Attending: FAMILY MEDICINE
Payer: COMMERCIAL

## 2018-03-05 VITALS — DIASTOLIC BLOOD PRESSURE: 68 MMHG | SYSTOLIC BLOOD PRESSURE: 110 MMHG

## 2018-03-05 VITALS — BODY MASS INDEX: 27.36 KG/M2 | HEIGHT: 73 IN | WEIGHT: 206.44 LBS

## 2018-03-05 VITALS — DIASTOLIC BLOOD PRESSURE: 86 MMHG | SYSTOLIC BLOOD PRESSURE: 138 MMHG

## 2018-03-05 VITALS — DIASTOLIC BLOOD PRESSURE: 75 MMHG | SYSTOLIC BLOOD PRESSURE: 120 MMHG

## 2018-03-05 DIAGNOSIS — R63.4: ICD-10-CM

## 2018-03-05 DIAGNOSIS — K29.70: ICD-10-CM

## 2018-03-05 DIAGNOSIS — F32.9: ICD-10-CM

## 2018-03-05 DIAGNOSIS — K29.80: ICD-10-CM

## 2018-03-05 DIAGNOSIS — K20.9: ICD-10-CM

## 2018-03-05 DIAGNOSIS — R11.2: Primary | ICD-10-CM

## 2018-03-05 DIAGNOSIS — R19.7: ICD-10-CM

## 2018-03-05 DIAGNOSIS — M06.9: ICD-10-CM

## 2018-03-05 DIAGNOSIS — Z79.899: ICD-10-CM

## 2018-03-05 LAB
ALBUMIN SERPL-MCNC: 4.6 GM/DL (ref 3.2–4.5)
ALP SERPL-CCNC: 51 U/L (ref 40–136)
ALT SERPL-CCNC: 19 U/L (ref 0–55)
BILIRUB SERPL-MCNC: 0.4 MG/DL (ref 0.1–1)
BUN/CREAT SERPL: 16
CALCIUM SERPL-MCNC: 9.9 MG/DL (ref 8.5–10.1)
CHLORIDE SERPL-SCNC: 107 MMOL/L (ref 98–107)
CO2 SERPL-SCNC: 22 MMOL/L (ref 21–32)
CREAT SERPL-MCNC: 0.8 MG/DL (ref 0.6–1.3)
GFR SERPLBLD BASED ON 1.73 SQ M-ARVRAT: > 60 ML/MIN
GLUCOSE SERPL-MCNC: 93 MG/DL (ref 70–105)
POTASSIUM SERPL-SCNC: 3.6 MMOL/L (ref 3.6–5)
PROT SERPL-MCNC: 7.6 GM/DL (ref 6.4–8.2)
SODIUM SERPL-SCNC: 138 MMOL/L (ref 135–145)

## 2018-03-05 PROCEDURE — 36415 COLL VENOUS BLD VENIPUNCTURE: CPT

## 2018-03-05 PROCEDURE — 85027 COMPLETE CBC AUTOMATED: CPT

## 2018-03-05 PROCEDURE — 78227 HEPATOBIL SYST IMAGE W/DRUG: CPT

## 2018-03-05 PROCEDURE — 80053 COMPREHEN METABOLIC PANEL: CPT

## 2018-03-05 RX ADMIN — METOCLOPRAMIDE SCH MG: 5 INJECTION, SOLUTION INTRAMUSCULAR; INTRAVENOUS at 17:36

## 2018-03-05 RX ADMIN — LEUCINE, PHENYLALANINE, LYSINE, METHIONINE, ISOLEUCINE, VALINE, HISTIDINE, THREONINE, TRYPTOPHAN, ALANINE, GLYCINE, ARGININE, PROLINE, SERINE, TYROSINE, SODIUM ACETATE, DIBASIC POTASSIUM PHOSPHATE, MAGNESIUM CHLORIDE, SODIUM CHLORIDE, CALCIUM CHLORIDE, DEXTROSE SCH MLS/HR
311; 238; 247; 170; 255; 247; 204; 179; 77; 880; 438; 489; 289; 213; 17; 297; 261; 51; 77; 33; 5 INJECTION INTRAVENOUS at 16:54

## 2018-03-05 RX ADMIN — SUCRALFATE SCH GM: 1 TABLET ORAL at 17:34

## 2018-03-05 RX ADMIN — ONDANSETRON PRN MG: 2 INJECTION, SOLUTION INTRAMUSCULAR; INTRAVENOUS at 19:28

## 2018-03-05 RX ADMIN — PANTOPRAZOLE SODIUM SCH MG: 40 INJECTION, POWDER, FOR SOLUTION INTRAVENOUS at 21:08

## 2018-03-05 RX ADMIN — SUCRALFATE SCH GM: 1 TABLET ORAL at 21:09

## 2018-03-05 NOTE — XMS REPORT
CCD document using C-CDA

 Created on: 2018



Jose Jones

External Reference #: [id number]

: 1980

Sex: Male



Demographics







 Address  [address]

[place]  [address]

 

 Home Phone  [phone]

 

 Preferred Language  English

 

 Marital Status  

 

 Druze Affiliation  Unknown

 

 Race  Other Race

 

 Ethnic Group  Not  or 





Author







 Author  Meghan Landrum MD, [facility]

 

 Address  [address] [place]  [address]



 

 Phone  [phone]







Care Team Providers







 Care Team Member Name  Role  Phone

 

  PP  Unavailable

 

  CCM  Unavailable



                                            



Summary Purpose

          Interface Exchange                                                   
                 



Insurance Providers

                      





 Payer name                    Policy type / Coverage type                    
Covered party ID                    Effective Begin Date                    
Effective End Date                

 

 [facility]/[employer]    
                [id number]                    2017                    
Unknown                



                                                                              



Family history

                      



Mother            





 Diagnosis                    Age At Onset                

 

 No Family Disease Entered                    N/A                



            



Sister            





 Diagnosis                    Age At Onset                

 

 No Family Disease Entered                    N/A                



            



Brother            





 Diagnosis                    Age At Onset                

 

 No Family Disease Entered                    N/A                



            



Daughter            





 Diagnosis                    Age At Onset                

 

 No Family Disease Entered                    N/A                



            



Brother            





 Diagnosis                    Age At Onset                

 

 No Family Disease Entered                    N/A                



            



Brother            





 Diagnosis                    Age At Onset                

 

 No Family Disease Entered                    N/A                



            



Brother            





 Diagnosis                    Age At Onset                

 

 No Family Disease Entered                    N/A                



            



Father            





 Diagnosis                    Age At Onset                

 

 No Family Disease Entered                    N/A                



                                                                               
                                                                     



Social History

                      





 Social History Element                    Codes                    Description
                    Effective Dates                

 

 Number of children                    Unknown                    1            
        2013                

 

 Marital status                    Unknown                              
          2012                

 

 Living arrangements                    Unknown                    House       
             2012                

 

 Employment                    Unknown                    Currently employed

[employer]                    2012                



                                                                               
                                       



Allergies, Adverse Reactions, Alerts

          Allergies, Adverse Reactions, Alerts data not found                  
                                                  



Past Medical History

                      





 Illness                    Codes                    Condition Status          
          Onset Date                    Resolved Date                

 

                     Abnormal weight loss                                      
ICD-9: 783.21

ICD-10: R63.4                    Active                    2018          
          Unknown                

 

                     Benign paroxysmal vertigo, bilateral                      
                ICD-9: 386.11

ICD-10: H81.13                    Active                    2017         
           Unknown                

 

                     Other fatigue                                      ICD-9: 
780.79

ICD-10: R53.83                    Active                    2018         
           Unknown                

 

                     Other malaise                                      ICD-9: 
780.79

ICD-10: R53.81                    Active                    2018         
           Unknown                

 

                     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 
without organ or systems involvement                                      ICD-9
: 714.0

ICD-10: M05.732                    Active                    2017        
            Unknown                

 

                     Acute bronchitis due to other specified organisms         
                             ICD-9: 466.0

ICD-10: J20.8                    Active                    05/15/2017          
          Unknown                

 

                     Other allergic rhinitis                                   
   ICD-9: 477.8

ICD-10: J30.89                    Active                    05/15/2017         
           Unknown                

 

                     Pain in right wrist                                      
ICD-9: 719.43

ICD-10: M25.531                    Active                    2017        
            Unknown                

 

                     Encounter for general adult medical examination without 
abnormal findings                                      ICD-9: V70.0

ICD-10: Z00.00                    Active                    2015         
           Unknown                

 

                     PREVENTIVE PHYSICAL EXAM                                  
    ICD-9: V70.0                    Active                    2015       
             Unknown                

 

                     ACHILLES TENDINITIS                                      
ICD-9: 726.71                    Active                    2013          
          Unknown                

 

                     ACUTE URI                                      ICD-9: 
465.9                    Active                    2012                  
  Unknown                

 

                     Elevated blood pressure                                   
   ICD-9: 796.2                    Active                    2012        
            Unknown                

 

                     Localized pruritus                                      ICD
-9: 698.9                    Active                    2012              
      Unknown                

 

                     Rash                                      ICD-9: 782.1    
                Active                    2012                    Unknown
                



                                                                               
                                                                               
                                                                      



Problems

                      





 Condition                    Codes                    Effective Dates         
           Condition Status                

 

                     Abnormal weight loss                                      
ICD-9: 783.21

ICD-10: R63.4                    2018                    Active          
      

 

                     Benign paroxysmal vertigo, bilateral                      
                ICD-9: 386.11

ICD-10: H81.13                    2017                    Active         
       

 

                     Other fatigue                                      ICD-9: 
780.79

ICD-10: R53.83                    2018                    Active         
       

 

                     Other malaise                                      ICD-9: 
780.79

ICD-10: R53.81                    2018                    Active         
       

 

                     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 
without organ or systems involvement                                      ICD-9
: 714.0

ICD-10: M05.732                    2017                    Active        
        

 

                     Acute bronchitis due to other specified organisms         
                             ICD-9: 466.0

ICD-10: J20.8                    05/15/2017                    Active          
      

 

                     Other allergic rhinitis                                   
   ICD-9: 477.8

ICD-10: J30.89                    05/15/2017                    Active         
       

 

                     Pain in right wrist                                      
ICD-9: 719.43

ICD-10: M25.531                    2017                    Active        
        

 

                     Encounter for general adult medical examination without 
abnormal findings                                      ICD-9: V70.0

ICD-10: Z00.00                    2015                    Active         
       

 

                     PREVENTIVE PHYSICAL EXAM                                  
    ICD-9: V70.0                    2015                    Active       
         

 

                     ACHILLES TENDINITIS                                      
ICD-9: 726.71                    2013                    Active          
      

 

                     ACUTE URI                                      ICD-9: 
465.9                    2012                    Active                

 

                     Elevated blood pressure                                   
   ICD-9: 796.2                    2012                    Active        
        

 

                     Localized pruritus                                      ICD
-9: 698.9                    2012                    Active              
  

 

                     Rash                                      ICD-9: 782.1    
                2012                    Active                



                                                                               
                                                                               
                                                                      



Medications

                      





 Medication                    Codes                    Instructions           
         Start Date                    Stop Date                    Status     
               Fill Instructions                

 

                     meclizine 25 mg tablet                                    
  RxNorm: 424402                    1 Tablet(s) PO TID as needed               
     2018                    Active        
                            

 

                     Zofran ODT 4 mg disintegrating tablet                     
                 RxNorm: 782389                    1 Tablet(s) PO TID as needed
                    2018                    
Active                                    

 

                     clobetasol 0.05 % topical cream                           
           RxNorm: 474150                    APPLY TO AFFECTED AREAS TOP as 
needed FOR ECZEMA                    2017                    No Stop Date
                    Active                                    

 

                     Voltaren 1 % topical gel                                  
    RxNorm: 045872                    1 Application TOP QID as needed          
          2017                    No Stop Date                    Active 
                                   

 

                     prednisone 10 mg tablet                                   
   RxNorm: 012182                    Tablet(s) PO UD                    2018                    Inactive                 
   6,5,4,3,2,1                

 

                     Kenalog 40 mg/mL suspension for injection                 
                     RxNorm: 0970037                    Milliliter(s) Inj      
              2017                    
Inactive                                    

 

                     Zyrtec 10 mg tablet                                      
RxNorm: 1880968                    1 Tablet(s) PO daily                    05/15
/2017                    2017                    Inactive                
                    

 

                     Kenalog 40 mg/mL suspension for injection                 
                     RxNorm: 9784800                    1 Milliliter(s) Inj    
                05/15/2017                    05/15/2017                    
Inactive                                    

 

                     prednisone 20 mg tablet                                   
   RxNorm: 525590                    1 Tablet(s) PO daily                    05/
15/2017                    2018                    Inactive              
                      

 

                     clobetasol 0.05 % topical cream                           
           RxNorm: 381829                    APPLY TO AFFECTED AREAS TOP as 
needed FOR ECZEMA                    2016  
                  Inactive                                    

 

                     prednisone 10 mg tablets in a dose pack                   
                   RxNorm: 413615                    Tablet(s) PO              
      2013                    Inactive     
                               

 

                     Diflucan 150 mg tablet                                    
  RxNorm: 489172                    1 Tablet(s) PO daily                    2013                    Inactive              
                      

 

                     nystatin-triamcinolone 100,000 unit/gram-0.1 % Ointment   
                                   RxNorm: 1672618                    1 
Application TOP BID                    2012
                    Inactive                                    

 

                     nystatin-triamcinolone 100,000 unit/gram-0.1 % Ointment   
                                   RxNorm: 7246284                    1 
Application TOP BID                    2012
                    Inactive                                    

 

                     Bactrim  mg-160 mg tablet                           
           RxNorm: 872018                    1 Tablet(s) PO BID                
    2012                    Inactive       
                             

 

                     prednisone 10 mg tablets in a dose pack                   
                   RxNorm: 084004                    Tablet(s) PO UD 6-5-4-3-2-
1                    2012                  
  Inactive                                    

 

                     itraconazole 100 mg Cap                                   
   RxNorm: 986835                    1 Capsule(s) PO                    2013                    Inactive                 
                   

 

                     Kenalog 40 mg/mL Susp for Injection                       
               RxNorm: 4639373                    Milliliter(s) Inj            
        2012                    Inactive   
                                 

 

                     Kenalog 40 mg/mL Susp for Injection                       
               RxNorm: 5890251                    2 Milliliter(s) Inj          
          2012                    Inactive 
                                   

 

                     meloxicam 15 mg tablet                                    
  RxNorm: 023081                    1 Tablet(s) PO daily                    No 
Start Date                                         Active                      
              

 

                     itraconazole 100 mg Cap                                   
   RxNorm: 006354                    1 Capsule(s) PO                    No 
Start Date                    2012                    Inactive           
                         

 

                     clobetasol 0.05 % topical cream                           
           RxNorm: 961639                    APPLY TO AFFECTED AREAS TOP as 
needed FOR ECZEMA                    No Start Date                    02/10/
2016                    Inactive                                    



                                                                               
                                                                               
                                                                               
                                                   



Medication Administered

                      





 Medication                    Codes                    Instructions           
         Start Date                    Status                

 

 Kenalog 40 mg/mL suspension for injection                    RxNorm: 1932168  
                  Milliliter                    2017                    
No longer Active                

 

 Kenalog 40 mg/mL suspension for injection                    RxNorm: 4329180  
                  1Milliliter                    05/15/2017                    
No longer Active                

 

 Kenalog 40 mg/mL Susp for Injection                    RxNorm: 0838049        
            2Milliliter                    2012                    No 
longer Active                



                                                                               
                   



Immunizations

          No Immunization data                                                 
         



Assessments

                      





 Condition                    Codes                    Effective Dates         
       

 

 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 
systems involvement                    ICD-10: M05.732

ICD-9: 714.0                    2018                

 

 Other fatigue                    ICD-10: R53.83

ICD-9: 780.79                    2018                

 

 Abnormal weight loss                    ICD-10: R63.4

ICD-9: 783.21                    2018                

 

 Other malaise                    ICD-10: R53.81

ICD-9: 780.79                    2018                

 

 Benign paroxysmal vertigo, bilateral                    ICD-10: H81.13

ICD-9: 386.11                    2018                

 

 Acute bronchitis due to other specified organisms                    ICD-10: 
J20.8

ICD-9: 466.0                    05/15/2017                

 

 Other allergic rhinitis                    ICD-10: J30.89

ICD-9: 477.8                    05/15/2017                

 

 Pain in right wrist                    ICD-10: M25.531

ICD-9: 719.43                    2017                

 

 Encounter for general adult medical examination without abnormal findings     
               ICD-10: Z00.00

ICD-9: V70.0                    06/15/2016                

 

 PREVENTIVE PHYSICAL EXAM                    ICD-9: V70.0                                    

 

 ACHILLES TENDINITIS                    ICD-9: 726.71                    2013                

 

 PRURITIC DISORDER                    ICD-9: 698.9                    2013                

 

 Rash                    ICD-9: 782.1                    2013            
    

 

 ACUTE URI                    ICD-9: 465.9                    2012       
         

 

 Elevated blood pressure                    ICD-9: 796.2                                    



                                                                    



Reason For Visit

                      





 Reason For Visit                    Effective Dates                    Notes  
              

 

 dizziness                    2018                                    

 

 wrist pain                    2017                                    

 

 cough                    05/15/2017                                    

 

 dizziness                    2017                                    

 

 wrist pain                    2017                                    

 

 ankle pain                    06/15/2016                                    

 

 skin lesion                    2015                                    

 

 ankle pain                    2013                                    

 

 skin lesion                    2013                                    

 

 headache                    2012                                    

 

 rash                    2012                    started on patient's 
right hand and spread to the left.  he was taking diflucan and nystatin cream.  
Once that cleared, it started on both of his arms.                  

 

 flare up of rash                    2012                                
    



                                                                              



Results

                      





 Observation                    Observation Code                    Item       
             Item Code                    Result                    Date       
         

 

 Jordyn                    [id number]                    JORDYN (FLAVIO) SCREEN           
                             DETECTED                     2017           
     

 

 Jordyn                    [id number]                    JORDYN (IFA) TITER              
                          1:160                     2017                

 

 Jordyn                    [id number]                    JORDYN PATTERN                  
                      SPECKLED                     2017                

 

 Jordyn                    [id number]                                                 
                               2017                

 

 Cbc With Differential                    Ord2                    WBC          
                              3.84 K/ul                    2017          
      

 

 Cbc With Differential                    Ord2                    RBC          
                              5.27 M/ul                    2017          
      

 

 Cbc With Differential                    Ord2                    HGB          
                              15.6 g/dl                    2017          
      

 

 Cbc With Differential                    Ord2                    Neut%        
                                56.7 %                    2017           
     

 

 Cbc With Differential                    Ord2                    HCT          
                              45.1 %                    2017             
   

 

 Cbc With Differential                    Ord2                    MCV          
                              85.6 fl                    2017            
    

 

 Cbc With Differential                    Ord2                    Lymph%       
                                 31.0 %                    2017          
      

 

 Cbc With Differential                    Ord2                    MCH          
                              29.6 pg                    2017            
    

 

 Cbc With Differential                    Ord2                    Mono%        
                                10.4 %                    2017           
     

 

 Cbc With Differential                    Ord2                    Eos%         
                               1.6 %                    2017             
   

 

 Cbc With Differential                    Ord2                    MCHC         
                               34.6 pg                    2017           
     

 

 Cbc With Differential                    Ord2                    PLT          
                              218 K/ul                    2017           
     

 

 Cbc With Differential                    Ord2                    Baso%        
                                0.3 %                    2017            
    

 

 Cbc With Differential                    Ord2                    Neut ABS#    
                                    2.18 K/ul                    2017    
            

 

 Cbc With Differential                    Ord2                    RDW          
                              14.9 %                    2017             
   

 

 Cbc With Differential                    Ord2                    Lymph ABS#   
                                     1.19 K/ul                    2017   
             

 

 Cbc With Differential                    Ord2                    Mono ABS#    
                                    0.4 K/ul                    2017     
           

 

 Cbc With Differential                    Ord2                    Eos ABS#     
                                   0.1 K/ul                    2017      
          

 

 Cbc With Differential                    Ord2                    Baso ABS#    
                                    0.0 K/ul                    2017     
           

 

 C-Reactive Protein  Qnt                    Crqnt                    CRP       
                                 0.7 mg/dl                    2017       
         

 

 Sed Rate                    Ord21                    ESR                      
                  4 mm/hr                    2017                

 

 Ra Factor                    Zws205                    RA FACTOR              
                          21.4 IU/ml                    2017             
   

 

 Uric Acid                    Ord77                    Uric A                  
                      6.2 mg/dL                    2017                

 

 Lipid                    Ord30                    CHOL                        
                193 mg/dL                    2016                

 

 Lipid                    Ord30                    HDL                         
               38.0 mg/dl                    2016                

 

 Lipid                    Ord30                    TRIG                        
                187 mg/dL                    2016                

 

 Lipid                    Ord30                    LDL                         
               118 mg/dL                    2016                

 

 Lipid                    Ord30                    C/HDL                       
                 5.1 Ratio                    2016                

 

 Tsh                    Ord6                    hTSH II                        
                1.78 uIU/mL                    2016                

 

 Comp Metabolic                    Tpd939                    NA                
                        135 mEq/L                    2016                

 

 Comp Metabolic                    Bty013                    K                 
                       4.3 mEq/L                    2016                

 

 Comp Metabolic                    Lac677                    CL                
                        102 mEq/L                    2016                

 

 Comp Metabolic                    Fii129                    CO2               
                         30.0 mEq/L                    2016              
  

 

 Comp Metabolic                    Bdh898                    ANION GAP         
                               7                     2016                

 

 Comp Metabolic                    Uit340                    GLUCOSE           
                             105 mg/dL                    2016           
     

 

 Comp Metabolic                    Qfb244                    Creat             
                           0.9 mg/dL                    2016             
   

 

 Comp Metabolic                    Qhs255                    eGFR              
                          102 ml/min/1.73m2                    2016      
          

 

 Comp Metabolic                    Xfy405                    BUN               
                         13 mg/dL                    2016                

 

 Comp Metabolic                    Biw091                    B/C Ratio         
                               14.4 Ratio                    2016        
        

 

 Comp Metabolic                    Sft993                    CALCIUM           
                             9.1 mg/dL                    2016           
     

 

 Comp Metabolic                    Iej638                    ALK PHOS          
                              53 U/L                    2016             
   

 

 Comp Metabolic                    Yfw313                    AST(SGOT)         
                               18 U/L                    2016            
    

 

 Comp Metabolic                    Hoo850                    ALT(SGPT)         
                               27 U/L                    2016            
    

 

 Comp Metabolic                    Iwo826                    BILI T            
                            0.5 mg/dL                    2016            
    

 

 Comp Metabolic                    Ulv823                    ALBUMIN           
                             4.4 g/dL                    2016            
    

 

 Comp Metabolic                    Ioe222                    TPRO              
                          6.9 g/dL                    2016                

 

 Comp Metabolic                    Lha819                    GLOB              
                          2.5 g/dL                    2016                

 

 Comp Metabolic                    Jui299                    A/G Ratio         
                               1.7 Ratio                    2016         
       

 

 Comp Metabolic                    Yjt173                    Osmo              
                          271 mOsmo                    2016              
  

 

 Cbc With Differential                    Ord2                    WBC          
                              4.48 K/ul                    2016          
      

 

 Cbc With Differential                    Ord2                    RBC          
                              5.41 M/ul                    2016          
      

 

 Cbc With Differential                    Ord2                    HGB          
                              15.7 g/dl                    2016          
      

 

 Cbc With Differential                    Ord2                    HCT          
                              46.7 %                    2016             
   

 

 Cbc With Differential                    Ord2                    Neut%        
                                48.9 %                    2016           
     

 

 Cbc With Differential                    Ord2                    MCV          
                              86.3 fl                    2016            
    

 

 Cbc With Differential                    Ord2                    Lymph%       
                                 36.6 %                    2016          
      

 

 Cbc With Differential                    Ord2                    MCH          
                              29.0 pg                    2016            
    

 

 Cbc With Differential                    Ord2                    Mono%        
                                11.4 %                    2016           
     

 

 Cbc With Differential                    Ord2                    Eos%         
                               2.9 %                    2016             
   

 

 Cbc With Differential                    Ord2                    MCHC         
                               33.6 pg                    2016           
     

 

 Cbc With Differential                    Ord2                    Baso%        
                                0.2 %                    2016            
    

 

 Cbc With Differential                    Ord2                    PLT          
                              215 K/ul                    2016           
     

 

 Cbc With Differential                    Ord2                    RDW          
                              15.0 %                    2016             
   

 

 Cbc With Differential                    Ord2                    Neut ABS#    
                                    2.19 K/ul                    2016    
            

 

 Cbc With Differential                    Ord2                    Lymph ABS#   
                                     1.64 K/ul                    2016   
             

 

 Cbc With Differential                    Ord2                    Mono ABS#    
                                    0.5 K/ul                    2016     
           

 

 Cbc With Differential                    Ord2                    Eos ABS#     
                                   0.1 K/ul                    2016      
          

 

 Cbc With Differential                    Ord2                    Baso ABS#    
                                    0.0 K/ul                    2016     
           

 

 Cbc With Differential                    Ord2                    New Analyzer 
Notice                                        Please note new ref ranges 
starting 2016 due to implemntation of new five part differential hematolgy 
analyzer.                     2016                



                                                                               
                                                                               
          



Review of Systems

                      





 System                    Result                    Effective Dates           
     

 

 Constitutional                    No recent illness                    2018                

 

 Constitutional                    No diaphoresis                    2018
                

 

 Constitutional                    No chills                    2018     
           

 

 Constitutional                    fatigue                    2018       
         

 

 Constitutional                    No fever                    2018      
          

 

 Constitutional                    malaise                    2018       
         

 

 Constitutional                    weight loss                    2018   
             

 

 Eyes                    No eye erythema                    2018         
       

 

 Ears/Nose/Throat/Neck                    No nasal discharge                    
2018                

 

 Ears/Nose/Throat/Neck                    No nasal allergies                    
2018                

 

 Cardiovascular                    No chest pain/pressure                                    

 

 Cardiovascular                    No dyspnea                    2018    
            

 

 Respiratory                    No cough                    2018         
       

 

 Respiratory                    No chest congestion                    2018                

 

 Gastrointestinal                    No abdominal pain                    2018                

 

 Gastrointestinal                    No constipation                    2018                

 

 Gastrointestinal                    No diarrhea                    2018 
               

 

 Gastrointestinal                    No gastroesophageal reflux                
    2018                

 

 Gastrointestinal                    No hematochezia                    2018                

 

 Gastrointestinal                    No melena                    2018   
             

 

 Gastrointestinal                    nausea                    2018      
          

 

 Gastrointestinal                    No vomiting                    2018 
               

 

 Gastrointestinal                    anorexia                    2018    
            

 

 Musculoskeletal                    No joint complaint                    2018                

 

 Musculoskeletal                    arthralgia(s)                    2018
                

 

 Dermatologic                    No rash                    2018         
       

 

 Neurologic                    No alteration of consciousness                  
  2018                

 

 Neurologic                    No mental status change                    2018                

 

 Psychiatric                    anxiety                    2018          
      

 

 Psychiatric                    depression                    2018       
         

 

 Constitutional                    No night sweats                    2018                

 

 Constitutional                    No insomnia                    2018   
             

 

 Constitutional                    No weight gain                    2018
                

 

 Eyes                    No eye discharge                    2018        
        

 

 Eyes                    No eye erythema                    2018         
       

 

 Ears/Nose/Throat/Neck                    dizziness                    2018                

 

 Respiratory                    No productive sputum                    2018                

 

 Genitourinary/Nephrology                    No dysuria                                    

 

 Constitutional                    No recent illness                    2017                

 

 Constitutional                    No chills                    2017     
           

 

 Constitutional                    No diaphoresis                    2017
                

 

 Constitutional                    No fever                    2017      
          

 

 Eyes                    No eye erythema                    2017         
       

 

 Ears/Nose/Throat/Neck                    No nasal discharge                    
2017                

 

 Ears/Nose/Throat/Neck                    No nasal allergies                    
2017                

 

 Cardiovascular                    No chest pain/pressure                                    

 

 Cardiovascular                    No dyspnea                    2017    
            

 

 Respiratory                    No cough                    2017         
       

 

 Respiratory                    No chest congestion                    2017                

 

 Gastrointestinal                    No abdominal pain                    2017                

 

 Musculoskeletal                    joint complaint                    2017                

 

 Neurologic                    No alteration of consciousness                  
  2017                

 

 Neurologic                    No mental status change                    2017                

 

 Constitutional                    recent illness                    05/15/2017
                

 

 Constitutional                    No chills                    05/15/2017     
           

 

 Constitutional                    fatigue                    05/15/2017       
         

 

 Constitutional                    No fever                    05/15/2017      
          

 

 Constitutional                    malaise                    05/15/2017       
         

 

 Eyes                    No eye discharge                    05/15/2017        
        

 

 Eyes                    No eye erythema                    05/15/2017         
       

 

 Ears/Nose/Throat/Neck                    nasal allergies                    05/
15/2017                

 

 Ears/Nose/Throat/Neck                    No nasal discharge                    
05/15/2017                

 

 Cardiovascular                    No chest pain/pressure                    05/
15/2017                

 

 Respiratory                    cough                    05/15/2017            
    

 

 Gastrointestinal                    No abdominal pain                    05/15/
2017                

 

 Gastrointestinal                    No constipation                    05/15/
2017                

 

 Gastrointestinal                    No diarrhea                    05/15/2017 
               

 

 Dermatologic                    No rash                    05/15/2017         
       

 

 Neurologic                    No alteration of consciousness                  
  05/15/2017                

 

 Ears/Nose/Throat/Neck                    dizziness                    05/15/
2017                

 

 Respiratory                    chest congestion                    05/15/2017 
               

 

 Respiratory                    dyspnea on exertion                    05/15/
2017                

 

 Respiratory                    No dyspnea                    05/15/2017       
         

 

 Respiratory                    wheezing                    05/15/2017         
       

 

 Neurologic                    No mental status change                    05/15/
2017                

 

 Constitutional                    No recent illness                    2017                

 

 Constitutional                    No anorexia                    2017   
             

 

 Constitutional                    No night sweats                    2017                

 

 Constitutional                    No chills                    2017     
           

 

 Constitutional                    No diaphoresis                    2017
                

 

 Constitutional                    No fatigue                    2017    
            

 

 Constitutional                    No fever                    2017      
          

 

 Constitutional                    No insomnia                    2017   
             

 

 Constitutional                    No malaise                    2017    
            

 

 Constitutional                    No weight loss                    2017
                

 

 Constitutional                    No weight gain                    2017
                

 

 Eyes                    No eye discharge                    2017        
        

 

 Eyes                    No eye erythema                    2017         
       

 

 Ears/Nose/Throat/Neck                    dizziness                    2017                

 

 Ears/Nose/Throat/Neck                    No nasal discharge                    
2017                

 

 Ears/Nose/Throat/Neck                    nasal allergies                    2017                

 

 Cardiovascular                    No chest pain/pressure                    2017                

 

 Cardiovascular                    No dyspnea                    2017    
            

 

 Respiratory                    No productive sputum                    2017                

 

 Respiratory                    No cough                    2017         
       

 

 Gastrointestinal                    No abdominal pain                    2017                

 

 Gastrointestinal                    No constipation                    2017                

 

 Gastrointestinal                    No diarrhea                    2017 
               

 

 Genitourinary/Nephrology                    No dysuria                                    

 

 Musculoskeletal                    No joint complaint                    2017                

 

 Dermatologic                    No rash                    2017         
       

 

 Neurologic                    No alteration of consciousness                  
  2017                

 

 Constitutional                    No recent illness                    2017                

 

 Constitutional                    No chills                    2017     
           

 

 Constitutional                    No fever                    2017      
          

 

 Eyes                    No eye erythema                    2017         
       

 

 Ears/Nose/Throat/Neck                    No nasal discharge                    
2017                

 

 Cardiovascular                    No chest pain/pressure                                    

 

 Cardiovascular                    No dyspnea                    2017    
            

 

 Respiratory                    No cough                    2017         
       

 

 Respiratory                    No dyspnea                    2017       
         

 

 Musculoskeletal                    joint complaint                    2017                

 

 Neurologic                    No alteration of consciousness                  
  2017                

 

 Neurologic                    No mental status change                    2017                

 

 Constitutional                    No recent illness                    06/15/
2016                

 

 Constitutional                    No night sweats                    06/15/
2016                

 

 Constitutional                    No chills                    06/15/2016     
           

 

 Constitutional                    No fatigue                    06/15/2016    
            

 

 Constitutional                    No fever                    06/15/2016      
          

 

 Constitutional                    No insomnia                    06/15/2016   
             

 

 Constitutional                    No malaise                    06/15/2016    
            

 

 Eyes                    No blindness                    06/15/2016            
    

 

 Eyes                    No vision change                    06/15/2016        
        

 

 Ears/Nose/Throat/Neck                    No dental pain                    06/
15/2016                

 

 Ears/Nose/Throat/Neck                    No dizziness                    06/15/
2016                

 

 Ears/Nose/Throat/Neck                    No dysphagia                    06/15/
2016                

 

 Ears/Nose/Throat/Neck                    No headache                    06/15/
2016                

 

 Ears/Nose/Throat/Neck                    No hearing loss                    06/
15/2016                

 

 Ears/Nose/Throat/Neck                    No nasal allergies                    
06/15/2016                

 

 Ears/Nose/Throat/Neck                    No sore throat                    06/
15/2016                

 

 Ears/Nose/Throat/Neck                    No postnasal drip                    
06/15/2016                

 

 Ears/Nose/Throat/Neck                    No sinus congestion                  
  06/15/2016                

 

 Cardiovascular                    No chest pain/pressure                    06/
15/2016                

 

 Cardiovascular                    No dyspnea                    06/15/2016    
            

 

 Cardiovascular                    No edema                    06/15/2016      
          

 

 Cardiovascular                    No exercise intolerance                    06
/15/2016                

 

 Cardiovascular                    No fatigue                    06/15/2016    
            

 

 Cardiovascular                    No hypertension                    06/15/
2016                

 

 Cardiovascular                    No near-syncope/dizziness                    
06/15/2016                

 

 Cardiovascular                    No palpitations                    06/15/
2016                

 

 Respiratory                    No chest tightness                    06/15/
2016                

 

 Respiratory                    No cigarette smoking                    06/15/
2016                

 

 Respiratory                    No cough                    06/15/2016         
       

 

 Respiratory                    No dyspnea on exertion                    06/15/
2016                

 

 Respiratory                    No dyspnea                    06/15/2016       
         

 

 Respiratory                    No pedal edema                    06/15/2016   
             

 

 Gastrointestinal                    No abdominal pain                    06/15/
2016                

 

 Gastrointestinal                    No constipation                    06/15/
2016                

 

 Gastrointestinal                    No diarrhea                    06/15/2016 
               

 

 Gastrointestinal                    No gastroesophageal reflux                
    06/15/2016                

 

 Gastrointestinal                    No hematochezia                    06/15/
2016                

 

 Gastrointestinal                    No nausea                    06/15/2016   
             

 

 Gastrointestinal                    No vomiting                    06/15/2016 
               

 

 Genitourinary/Nephrology                    No dysuria                    06/15
/2016                

 

 Genitourinary/Nephrology                    No impotence                    06/
15/2016                

 

 Genitourinary/Nephrology                    No nocturia                    06/
15/2016                

 

 Genitourinary/Nephrology                    No urinary urgency                
    06/15/2016                

 

 Genitourinary/Nephrology                    No urinary frequency              
      06/15/2016                

 

 Genitourinary/Nephrology                    No urinary incontinence           
         06/15/2016                

 

 Musculoskeletal                    No stiffness                    06/15/2016 
               

 

 Musculoskeletal                    No swelling                    06/15/2016  
              

 

 Musculoskeletal                    No arthralgia(s)                    06/15/
2016                

 

 Musculoskeletal                    No joint complaint                    06/15/
2016                

 

 Musculoskeletal                    No muscle weakness                    06/15/
2016                

 

 Musculoskeletal                    No myalgias                    06/15/2016  
              

 

 Dermatologic                    mole change                    06/15/2016     
           

 

 Dermatologic                    No rash                    06/15/2016         
       

 

 Dermatologic                    No sores                    06/15/2016        
        

 

 Dermatologic                    No scar                    06/15/2016         
       

 

 Neurologic                    No ataxia                    06/15/2016         
       

 

 Neurologic                    No dizziness                    06/15/2016      
          

 

 Neurologic                    No dyskinesia or tremor                    06/15/
2016                

 

 Neurologic                    No gait abnormality                    06/15/
2016                

 

 Neurologic                    No headache                    06/15/2016       
         

 

 Neurologic                    No neck pain                    06/15/2016      
          

 

 Neurologic                    No syncope                    06/15/2016        
        

 

 Psychiatric                    No anxiety                    06/15/2016       
         

 

 Psychiatric                    No depression                    06/15/2016    
            

 

 Constitutional                    No recent illness                    2015                

 

 Constitutional                    No chills                    2015     
           

 

 Constitutional                    No fatigue                    2015    
            

 

 Constitutional                    No fever                    2015      
          

 

 Constitutional                    No insomnia                    2015   
             

 

 Constitutional                    No malaise                    2015    
            

 

 Eyes                    No blindness                    2015            
    

 

 Eyes                    No vision change                    2015        
        

 

 Ears/Nose/Throat/Neck                    No dental pain                                    

 

 Ears/Nose/Throat/Neck                    No dizziness                    2015                

 

 Ears/Nose/Throat/Neck                    No dysphagia                    2015                

 

 Ears/Nose/Throat/Neck                    No headache                    2015                

 

 Ears/Nose/Throat/Neck                    No hearing loss                                    

 

 Ears/Nose/Throat/Neck                    No nasal allergies                    
2015                

 

 Ears/Nose/Throat/Neck                    No sore throat                                    

 

 Ears/Nose/Throat/Neck                    No postnasal drip                    
2015                

 

 Ears/Nose/Throat/Neck                    No sinus congestion                  
  2015                

 

 Cardiovascular                    No chest pain/pressure                                    

 

 Cardiovascular                    No dyspnea                    2015    
            

 

 Cardiovascular                    No edema                    2015      
          

 

 Cardiovascular                    No exercise intolerance                                    

 

 Cardiovascular                    No fatigue                    2015    
            

 

 Cardiovascular                    No near-syncope/dizziness                    
2015                

 

 Respiratory                    No chest tightness                    2015                

 

 Respiratory                    No cough                    2015         
       

 

 Respiratory                    No dyspnea                    2015       
         

 

 Respiratory                    No pedal edema                    2015   
             

 

 Gastrointestinal                    No abdominal pain                    2015                

 

 Gastrointestinal                    No constipation                    2015                

 

 Gastrointestinal                    No diarrhea                    2015 
               

 

 Gastrointestinal                    No gastroesophageal reflux                
    2015                

 

 Gastrointestinal                    No nausea                    2015   
             

 

 Gastrointestinal                    No vomiting                    2015 
               

 

 Genitourinary/Nephrology                    No dysuria                                    

 

 Genitourinary/Nephrology                    No nocturia                                    

 

 Genitourinary/Nephrology                    No urinary incontinence           
         2015                

 

 Musculoskeletal                    No stiffness                    2015 
               

 

 Musculoskeletal                    No swelling                    2015  
              

 

 Musculoskeletal                    No muscle weakness                    2015                

 

 Musculoskeletal                    No myalgias                    2015  
              

 

 Dermatologic                    No rash                    2015         
       

 

 Dermatologic                    No sores                    2015        
        

 

 Dermatologic                    No scar                    2015         
       

 

 Neurologic                    No dizziness                    2015      
          

 

 Neurologic                    No headache                    2015       
         

 

 Neurologic                    No neck pain                    2015      
          

 

 Neurologic                    No syncope                    2015        
        

 

 Psychiatric                    No anxiety                    2015       
         

 

 Psychiatric                    No depression                    2015    
            

 

 Constitutional                    No night sweats                    2015                

 

 Cardiovascular                    No hypertension                    2015                

 

 Cardiovascular                    No palpitations                    2015                

 

 Respiratory                    No cigarette smoking                    2015                

 

 Respiratory                    No dyspnea on exertion                    2015                

 

 Gastrointestinal                    No hematochezia                    2015                

 

 Genitourinary/Nephrology                    No impotence                                    

 

 Genitourinary/Nephrology                    No urinary frequency              
      2015                

 

 Genitourinary/Nephrology                    No urinary urgency                
    2015                

 

 Musculoskeletal                    No arthralgia(s)                    2015                

 

 Musculoskeletal                    No joint complaint                    2015                

 

 Neurologic                    No ataxia                    2015         
       

 

 Neurologic                    No dyskinesia or tremor                    2015                

 

 Neurologic                    No gait abnormality                    2015                

 

 Dermatologic                    mole change                    2015     
           

 

 Constitutional                    No recent illness                    2013                

 

 Constitutional                    No chills                    2013     
           

 

 Constitutional                    No fatigue                    2013    
            

 

 Constitutional                    No fever                    2013      
          

 

 Constitutional                    No insomnia                    2013   
             

 

 Constitutional                    No malaise                    2013    
            

 

 Cardiovascular                    No chest pain/pressure                                    

 

 Cardiovascular                    No dyspnea                    2013    
            

 

 Cardiovascular                    No edema                    2013      
          

 

 Cardiovascular                    No exercise intolerance                                    

 

 Cardiovascular                    No fatigue                    2013    
            

 

 Cardiovascular                    No near-syncope/dizziness                    
2013                

 

 Respiratory                    No chest tightness                    2013                

 

 Respiratory                    No cigarette smoking                    2013                

 

 Respiratory                    No cough                    2013         
       

 

 Respiratory                    No dyspnea                    2013       
         

 

 Respiratory                    No pedal edema                    2013   
             

 

 Respiratory                    No snoring                    2013       
         

 

 Respiratory                    No wheezing                    2013      
          

 

 Psychiatric                    No anxiety                    2013       
         

 

 Psychiatric                    No depression                    2013    
            

 

 Dermatologic                    No rash                    2013         
       

 

 Dermatologic                    No scar                    2013         
       

 

 Gastrointestinal                    No hemorrhoids                    2013                

 

 Gastrointestinal                    No abdominal pain                    2013                

 

 Gastrointestinal                    No constipation                    2013                

 

 Gastrointestinal                    No diarrhea                    2013 
               

 

 Gastrointestinal                    No gastroesophageal reflux                
    2013                

 

 Gastrointestinal                    No melena                    2013   
             

 

 Gastrointestinal                    No nausea                    2013   
             

 

 Gastrointestinal                    No vomiting                    2013 
               

 

 Constitutional                    No chills                    2013     
           

 

 Constitutional                    No fatigue                    2013    
            

 

 Constitutional                    No fever                    2013      
          

 

 Eyes                    No vision change                    2013        
        

 

 Ears/Nose/Throat/Neck                    No dizziness                    2013                

 

 Ears/Nose/Throat/Neck                    No headache                    2013                

 

 Cardiovascular                    No chest pain/pressure                                    

 

 Respiratory                    No dyspnea                    2013       
         

 

 Gastrointestinal                    No constipation                    2013                

 

 Gastrointestinal                    No diarrhea                    2013 
               

 

 Gastrointestinal                    No nausea                    2013   
             

 

 Gastrointestinal                    No vomiting                    2013 
               

 

 Neurologic                    No dizziness                    2013      
          

 

 Psychiatric                    No anxiety                    2013       
         

 

 Psychiatric                    No depression                    2013    
            

 

 Constitutional                    recent illness                    2012
                

 

 Constitutional                    No anorexia                    2012   
             

 

 Constitutional                    No night sweats                    2012                

 

 Constitutional                    No chills                    2012     
           

 

 Constitutional                    No diaphoresis                    2012
                

 

 Constitutional                    No fatigue                    2012    
            

 

 Constitutional                    No fever                    2012      
          

 

 Constitutional                    No insomnia                    2012   
             

 

 Constitutional                    No malaise                    2012    
            

 

 Eyes                    No eye discharge                    2012        
        

 

 Eyes                    No eye erythema                    2012         
       

 

 Cardiovascular                    No chest pain/pressure                                    

 

 Respiratory                    cough                    2012            
    

 

 Respiratory                    chest congestion                    2012 
               

 

 Respiratory                    No dyspnea on exertion                    2012                

 

 Respiratory                    No dyspnea                    2012       
         

 

 Gastrointestinal                    No vomiting                    2012 
               

 

 Gastrointestinal                    No nausea                    2012   
             

 

 Gastrointestinal                    No constipation                    2012                

 

 Gastrointestinal                    No diarrhea                    2012 
               

 

 Gastrointestinal                    No abdominal pain                    2012                

 

 Genitourinary/Nephrology                    No dysuria                                    

 

 Musculoskeletal                    No joint complaint                    2012                

 

 Constitutional                    No chills                    2012     
           

 

 Constitutional                    No fatigue                    2012    
            

 

 Eyes                    No vision change                    2012        
        

 

 Respiratory                    No dyspnea                    2012       
         

 

 Constitutional                    No fever                    2012      
          

 

 Ears/Nose/Throat/Neck                    No headache                    2012                

 

 Ears/Nose/Throat/Neck                    No dizziness                    2012                

 

 Cardiovascular                    No chest pain/pressure                                    

 

 Gastrointestinal                    No constipation                    2012                

 

 Gastrointestinal                    No diarrhea                    2012 
               

 

 Gastrointestinal                    No vomiting                    2012 
               

 

 Gastrointestinal                    No nausea                    2012   
             

 

 Neurologic                    No dizziness                    2012      
          

 

 Psychiatric                    No anxiety                    2012       
         

 

 Psychiatric                    No depression                    2012    
            

 

 Constitutional                    No fever                    2012      
          

 

 Cardiovascular                    No chest pain/pressure                                    

 

 Ears/Nose/Throat/Neck                    No dizziness                    2012                

 

 Ears/Nose/Throat/Neck                    No headache                    2012                

 

 Gastrointestinal                    No constipation                    2012                

 

 Gastrointestinal                    No diarrhea                    2012 
               

 

 Gastrointestinal                    No nausea                    2012   
             

 

 Gastrointestinal                    No vomiting                    2012 
               

 

 Neurologic                    No dizziness                    2012      
          

 

 Psychiatric                    No anxiety                    2012       
         

 

 Psychiatric                    No depression                    2012    
            



                                                                    



Physical Exam

                      





 Exam Name                    System Name                    Item Name         
           Status                    Result                    Effective Dates 
                   Notes                

 

 Full Exam - General                     Constitutional                     
general appearance                    Hygiene/Attention to Grooming:           
         good hygiene                    2018                    None    
            

 

 Full Exam - General                     Eyes                    conjunctiva
/eyelids                    Overall:                    conjunctiva clear      
              2018                    None                

 

 Full Exam - General                     Eyes                    conjunctiva
/eyelids                    Overall:                    cornea clear           
         2018                    None                

 

 Full Exam - General                     Eyes                    conjunctiva
/eyelids                    Overall:                    eyelids normal         
           2018                    None                

 

 Full Exam - General                     Eyes                    pupils and 
irises                    Overall:                    pupils equal, round, 
reactive to light and accomodation                    2018               
     None                

 

 Full Exam - General                     Ears/Nose/Throat                  
  otoscopic exam                    Overall:                    external 
auditory canals clear                    2018                    None    
            

 

 Full Exam - General                     Ears/Nose/Throat                  
  otoscopic exam                    Overall:                    tympanic 
membranes clear                    2018                    None          
      

 

 Full Exam - General                     Ears/Nose/Throat                  
  hearing assessment                    Overall:                    hearing 
intact bilaterally                    2018                    None       
         

 

 Full Exam - General                     Ears/Nose/Throat                  
  lips/teeth/gingiva                    Overall:                    benign lips
                    2018                    None                

 

 Full Exam - General                     Ears/Nose/Throat                  
  lips/teeth/gingiva                    Overall:                    normal 
dentition                    2018                    None                

 

 Full Exam - General                     Ears/Nose/Throat                  
  lips/teeth/gingiva                    Overall:                    benign 
gingiva                    2018                    None                

 

 Full Exam - General                     Ears/Nose/Throat                  
  oral cavity/pharynx/larynx                     Overall:                    
oral mucosa clear                    2018                    None        
        

 

 Full Exam - General                     Ears/Nose/Throat                  
  oral cavity/pharynx/larynx                     Overall:                    
oropharyngeal mucosa clear                    2018                    
None                

 

 Full Exam - General                     Respiratory                    
auscultation                    Overall:                    breath sounds clear 
bilaterally                    2018                    None              
  

 

 Full Exam - General                     Respiratory                    
respiratory effort/rhythm                    Overall:                    no 
retractions                    2018                    None              
  

 

 Full Exam - General                     Respiratory                    
respiratory effort/rhythm                    Overall:                    normal 
rate                    2018                    None                

 

 Full Exam - General                     Cardiovascular                    
extremities                    Overall:                    no clubbing         
           2018                    None                

 

 Full Exam - General                     Cardiovascular                    
auscultation of heart                    Overall:                    regular 
rate                    2018                    None                

 

 Full Exam - General                     Cardiovascular                    
auscultation of heart                    Overall:                    normal 
heart sounds                    2018                    None             
   

 

 Full Exam - General                     Abdomen                    
abdominal exam                    Overall:                    no tenderness    
                2018                    None                

 

 Full Exam - General                     Abdomen                    
abdominal exam                    Overall:                    normal bowel 
sounds                    2018                    None                

 

 Full Exam - General                     Lymphatic                    neck 
nodes                    Overall:                    anterior cervical chain 
benign                    2018                    None                

 

 Full Exam - General                     Lymphatic                    neck 
nodes                    Overall:                    posterior cervical chain 
benign                    2018                    None                

 

 Full Exam - General                     Musculoskeletal                    
spine, ribs and pelvis                    Overall:                    good 
posture                    2018                    None                

 

 Full Exam - General                     Musculoskeletal                    
gait and station                    Overall:                    normal gait    
                2018                    None                

 

 Full Exam - General                     Musculoskeletal                    
gait and station                    Overall:                    normal station 
                   2018                    None                

 

 Full Exam - General                     Musculoskeletal                    
head and neck                    Overall:                    head atraumatic   
                 2018                    None                

 

 Full Exam - General                     Neurologic                    
cranial nerves                    Overall:                    crainial nerves 2 
- 12 grossly intact                    2018                    None      
          

 

 Full Exam - General                     Psychiatric                    
orientation/consciousness                    Overall:                    
oriented to person, place and time                    2018               
     None                

 

 Full Exam - General                     Constitutional                     
general appearance                    Overall:                    well 
developed                    2018                    None                

 

 Full Exam - General                     Constitutional                     
general appearance                    Overall:                    well 
nourished                    2018                    None                

 

 Full Exam - General                     Constitutional                     
general appearance                    Evidence of Distress:                    
mild distress                    2018                    None            
    

 

 Full Exam - General                     Constitutional                     
general appearance                    Evidence of Distress:                    
tearful                    2018                    None                

 

 Full Exam - General                     Ears/Nose/Throat                  
  otoscopic exam                    Tympanic membrane:                    air-
fluid level                    2018                    None              
  

 

 Full Exam - General                     Psychiatric                    
mood and affect                    Mood:                    flat               
     2018                    None                

 

 Full Exam - General                     Psychiatric                    
mood and affect                    Mood:                    depressed          
          2018                    None                

 

 Full Exam - General                     Psychiatric                    
mood and affect                    Mood:                    anxious            
        2018                    None                

 

 Full Exam - General                     Psychiatric                    
mood and affect                    Affect:                    flat             
       2018                    None                

 

 Full Exam - Orthopedics                    Constitutional                     
general appearance                    Overall:                    well 
nourished                    2017                    None                

 

 Full Exam - Orthopedics                    Constitutional                     
general appearance                    Overall:                    well 
developed                    2017                    None                

 

 Full Exam - Orthopedics                    Constitutional                     
general appearance                    Overall:                    in no acute 
distress                    2017                    None                

 

 Full Exam - Orthopedics                    Eyes                    conjunctiva/
eyelids                    Overall:                    conjunctiva clear       
             2017                    None                

 

 Full Exam - Orthopedics                    Eyes                    conjunctiva/
eyelids                    Overall:                    eyelids normal          
          2017                    None                

 

 Full Exam - Orthopedics                    Eyes                    conjunctiva/
eyelids                    Overall:                    cornea clear            
        2017                    None                

 

 Full Exam - Orthopedics                    Ears/Nose/Throat                    
oral cavity/pharynx/larynx                     Overall:                    oral 
mucosa clear                    2017                    None             
   

 

 Full Exam - Orthopedics                    Ears/Nose/Throat                    
lips/teeth/gingiva                    Overall:                    benign lips  
                  2017                    None                

 

 Full Exam - Orthopedics                    Respiratory                    
respiratory effort/rhythm                    Overall:                    no 
retractions                    2017                    None              
  

 

 Full Exam - Orthopedics                    Respiratory                    
respiratory effort/rhythm                    Overall:                    normal 
rate                    2017                    None                

 

 Full Exam - Orthopedics                    Respiratory                    
auscultation                    Overall:                    breath sounds clear 
bilaterally                    2017                    None              
  

 

 Full Exam - Orthopedics                    MS: left upper extremity           
         insp & palp - LUE                    Wrist:                    
swelling                    2017                    None                

 

 Full Exam - Orthopedics                    MS: left upper extremity           
         insp & palp - LUE                    Wrist:                    tender 
                   2017                    None                

 

 Full Exam - Orthopedics                    MS: left upper extremity           
         range of motion - LUE                    Wrist:                    
pain with extension                    2017                    None      
          

 

 Full Exam - Orthopedics                    MS: left upper extremity           
         range of motion - LUE                    Wrist:                    
pain with flexion                    2017                    None        
        

 

 Full Exam - Orthopedics                    Psychiatric                    
orientation/consciousness                    Overall:                    
oriented to person, place and time                    2017               
     None                

 

 Full Exam - Orthopedics                    Psychiatric                    mood 
and affect                    Overall:                    normal mood and 
affect                    2017                    None                

 

 Full Exam - Orthopedics                    Psychiatric                    
appearance                    Overall:                    well-groomed, good 
eye contact                    2017                    None              
  

 

 Full Exam - General                     Constitutional                     
general appearance                    Hygiene/Attention to Grooming:           
         good hygiene                    05/15/2017                    None    
            

 

 Full Exam - General                     Eyes                    conjunctiva
/eyelids                    Overall:                    conjunctiva clear      
              05/15/2017                    None                

 

 Full Exam - General                     Eyes                    conjunctiva
/eyelids                    Overall:                    eyelids normal         
           05/15/2017                    None                

 

 Full Exam - General                     Ears/Nose/Throat                  
  otoscopic exam                    Overall:                    external 
auditory canals clear                    05/15/2017                    None    
            

 

 Full Exam - General                     Ears/Nose/Throat                  
  otoscopic exam                    Overall:                    tympanic 
membranes clear                    05/15/2017                    None          
      

 

 Full Exam - General                     Ears/Nose/Throat                  
  lips/teeth/gingiva                    Overall:                    benign lips
                    05/15/2017                    None                

 

 Full Exam - General                     Ears/Nose/Throat                  
  oral cavity/pharynx/larynx                     Overall:                    
oral mucosa clear                    05/15/2017                    None        
        

 

 Full Exam - General                     Ears/Nose/Throat                  
  oral cavity/pharynx/larynx                     Overall:                    
oropharyngeal mucosa clear                    05/15/2017                    
None                

 

 Full Exam - General                     Respiratory                    
respiratory effort/rhythm                    Overall:                    no 
retractions                    05/15/2017                    None              
  

 

 Full Exam - General                     Respiratory                    
respiratory effort/rhythm                    Overall:                    normal 
rate                    05/15/2017                    None                

 

 Full Exam - General                     Cardiovascular                    
auscultation of heart                    Overall:                    regular 
rate                    05/15/2017                    None                

 

 Full Exam - General                     Cardiovascular                    
auscultation of heart                    Overall:                    normal 
heart sounds                    05/15/2017                    None             
   

 

 Full Exam - General                     Musculoskeletal                    
spine, ribs and pelvis                    Overall:                    good 
posture                    05/15/2017                    None                

 

 Full Exam - General                     Musculoskeletal                    
gait and station                    Overall:                    normal gait    
                05/15/2017                    None                

 

 Full Exam - General                     Musculoskeletal                    
gait and station                    Overall:                    normal station 
                   05/15/2017                    None                

 

 Full Exam - General                     Musculoskeletal                    
head and neck                    Overall:                    head atraumatic   
                 05/15/2017                    None                

 

 Full Exam - General                     Neurologic                    
cranial nerves                    Overall:                    crainial nerves 2 
- 12 grossly intact                    05/15/2017                    None      
          

 

 Full Exam - General                     Psychiatric                    
orientation/consciousness                    Overall:                    
oriented to person, place and time                    05/15/2017               
     None                

 

 Full Exam - General                     Psychiatric                    
mood and affect                    Overall:                    normal mood and 
affect                    05/15/2017                    None                

 

 Full Exam - General                     Constitutional                     
general appearance                    Overall:                    well 
developed                    05/15/2017                    None                

 

 Full Exam - General                     Constitutional                     
general appearance                    Overall:                    in no acute 
distress                    05/15/2017                    None                

 

 Full Exam - General                     Constitutional                     
general appearance                    Overall:                    well 
nourished                    05/15/2017                    None                

 

 Full Exam - General                     Respiratory                    
auscultation                    Lower lung field:                    expiratory 
wheezes                    05/15/2017                    None                

 

 Full Exam - General                     Lymphatic                    neck 
nodes                    Overall:                    posterior cervical chain 
benign                    05/15/2017                    None                

 

 Full Exam - General                     Lymphatic                    neck 
nodes                    Overall:                    anterior cervical chain 
benign                    05/15/2017                    None                

 

 Full Exam - General                     Constitutional                     
general appearance                    Development:                    well 
developed                    2017                    None                

 

 Full Exam - General                     Constitutional                     
general appearance                    Development:                    appears 
stated age                    2017                    None                

 

 Full Exam - General                     Constitutional                     
general appearance                    Hygiene/Attention to Grooming:           
         good hygiene                    2017                    None    
            

 

 Full Exam - General                     Eyes                    conjunctiva
/eyelids                    Overall:                    conjunctiva clear      
              2017                    None                

 

 Full Exam - General                     Eyes                    conjunctiva
/eyelids                    Overall:                    cornea clear           
         2017                    None                

 

 Full Exam - General                     Eyes                    conjunctiva
/eyelids                    Overall:                    eyelids normal         
           2017                    None                

 

 Full Exam - General                     Eyes                    pupils and 
irises                    Overall:                    pupils equal, round, 
reactive to light and accomodation                    2017               
     None                

 

 Full Exam - General                     Ears/Nose/Throat                  
  external ear                    Overall:                    normal appearance
                    2017                    None                

 

 Full Exam - General                     Ears/Nose/Throat                  
  external nose                    Overall:                    benign 
appearance                    2017                    None                

 

 Full Exam - General                     Ears/Nose/Throat                  
  otoscopic exam                    Overall:                    external 
auditory canals clear                    2017                    None    
            

 

 Full Exam - General                     Ears/Nose/Throat                  
  otoscopic exam                    Overall:                    tympanic 
membranes clear                    2017                    None          
      

 

 Full Exam - General                     Ears/Nose/Throat                  
  hearing assessment                    Overall:                    hearing 
intact bilaterally                    2017                    None       
         

 

 Full Exam - General                     Ears/Nose/Throat                  
  lips/teeth/gingiva                    Overall:                    benign lips
                    2017                    None                

 

 Full Exam - General                     Ears/Nose/Throat                  
  lips/teeth/gingiva                    Overall:                    normal 
dentition                    2017                    None                

 

 Full Exam - General                     Ears/Nose/Throat                  
  lips/teeth/gingiva                    Overall:                    benign 
gingiva                    2017                    None                

 

 Full Exam - General                     Ears/Nose/Throat                  
  oral cavity/pharynx/larynx                     Overall:                    
oral mucosa clear                    2017                    None        
        

 

 Full Exam - General                     Ears/Nose/Throat                  
  oral cavity/pharynx/larynx                     Overall:                    
oropharyngeal mucosa clear                    2017                    
None                

 

 Full Exam - General                     Ears/Nose/Throat                  
  oral cavity/pharynx/larynx                     Overall:                    
hypopharynx benign                    2017                    None       
         

 

 Full Exam - General                     Ears/Nose/Throat                  
  oral cavity/pharynx/larynx                     Overall:                    no 
masses                    2017                    None                

 

 Full Exam - General                     Neck                    thyroid   
                 Overall:                    nontender                    2017                    None                

 

 Full Exam - General                     Neck                    thyroid   
                 Overall:                    no mass lesions                    
2017                    None                

 

 Full Exam - General                     Respiratory                    
auscultation                    Overall:                    breath sounds clear 
bilaterally                    2017                    None              
  

 

 Full Exam - General                     Respiratory                    
respiratory effort/rhythm                    Overall:                    no 
retractions                    2017                    None              
  

 

 Full Exam - General                     Respiratory                    
respiratory effort/rhythm                    Overall:                    normal 
rate                    2017                    None                

 

 Full Exam - General                     Cardiovascular                    
inspection of carotid pulses                    Overall:                    
strong, bilaterally equal, no bruits                    2017             
       None                

 

 Full Exam - General                     Cardiovascular                    
extremities                    Overall:                    no clubbing         
           2017                    None                

 

 Full Exam - General                     Cardiovascular                    
auscultation of heart                    Overall:                    regular 
rate                    2017                    None                

 

 Full Exam - General                     Cardiovascular                    
auscultation of heart                    Overall:                    normal 
heart sounds                    2017                    None             
   

 

 Full Exam - General                     Cardiovascular                    
auscultation of heart                    Overall:                    no murmurs
                    2017                    None                

 

 Full Exam - General                     Abdomen                    
abdominal exam                    Overall:                    no tenderness    
                2017                    None                

 

 Full Exam - General                     Abdomen                    
abdominal exam                    Overall:                    normal bowel 
sounds                    2017                    None                

 

 Full Exam - General                     Abdomen                    liver 
and spleen exam                    Overall:                    no 
hepatosplenomegaly                    2017                    None       
         

 

 Full Exam - General                     Lymphatic                    neck 
nodes                    Overall:                    anterior cervical chain 
benign                    2017                    None                

 

 Full Exam - General                     Lymphatic                    neck 
nodes                    Overall:                    posterior cervical chain 
benign                    2017                    None                

 

 Full Exam - General                     Musculoskeletal                    
digits and nails                    Digits:                    a normal exam   
                 2017                    None                

 

 Full Exam - General                     Musculoskeletal                    
spine, ribs and pelvis                    Overall:                    spine 
benign                    2017                    None                

 

 Full Exam - General                     Musculoskeletal                    
spine, ribs and pelvis                    Overall:                    
sacroiliac joint benign                    2017                    None  
              

 

 Full Exam - General                     Musculoskeletal                    
spine, ribs and pelvis                    Overall:                    good 
posture                    2017                    None                

 

 Full Exam - General                     Musculoskeletal                    
gait and station                    Overall:                    normal gait    
                2017                    None                

 

 Full Exam - General                     Musculoskeletal                    
gait and station                    Overall:                    normal station 
                   2017                    None                

 

 Full Exam - General                     Musculoskeletal                    
head and neck                    Overall:                    head atraumatic   
                 2017                    None                

 

 Full Exam - General                     Musculoskeletal                    
head and neck                    Overall:                    cervical spine 
benign                    2017                    None                

 

 Full Exam - General                     Neurologic                    deep 
tendon reflexes                    Overall:                    deep tendon 
reflexes intact                    2017                    None          
      

 

 Full Exam - General                     Neurologic                    
cranial nerves                    Overall:                    crainial nerves 2 
- 12 grossly intact                    2017                    None      
          

 

 Full Exam - General                     Psychiatric                    
orientation/consciousness                    Overall:                    
oriented to person, place and time                    2017               
     None                

 

 Full Exam - General                     Psychiatric                    
mood and affect                    Overall:                    normal mood and 
affect                    2017                    None                

 

 Full Exam - Orthopedics                    Constitutional                     
general appearance                    Overall:                    well 
nourished                    2017                    None                

 

 Full Exam - Orthopedics                    Constitutional                     
general appearance                    Overall:                    well 
developed                    2017                    None                

 

 Full Exam - Orthopedics                    Constitutional                     
general appearance                    Overall:                    in no acute 
distress                    2017                    None                

 

 Full Exam - Orthopedics                    Eyes                    conjunctiva/
eyelids                    Overall:                    conjunctiva clear       
             2017                    None                

 

 Full Exam - Orthopedics                    Eyes                    conjunctiva/
eyelids                    Overall:                    eyelids normal          
          2017                    None                

 

 Full Exam - Orthopedics                    Ears/Nose/Throat                    
lips/teeth/gingiva                    Overall:                    benign lips  
                  2017                    None                

 

 Full Exam - Orthopedics                    Ears/Nose/Throat                    
oral cavity/pharynx/larynx                     Overall:                    oral 
mucosa clear                    2017                    None             
   

 

 Full Exam - Orthopedics                    Respiratory                    
respiratory effort/rhythm                    Overall:                    no 
retractions                    2017                    None              
  

 

 Full Exam - Orthopedics                    Respiratory                    
respiratory effort/rhythm                    Overall:                    normal 
rate                    2017                    None                

 

 Full Exam - Orthopedics                    Psychiatric                    
orientation/consciousness                    Overall:                    
oriented to person, place and time                    2017               
     None                

 

 Full Exam - Orthopedics                    Psychiatric                    mood 
and affect                    Overall:                    normal mood and 
affect                    2017                    None                

 

 Full Exam - Orthopedics                    Psychiatric                    
appearance                    Overall:                    well-groomed, good 
eye contact                    2017                    None              
  

 

 Full Exam - Orthopedics                    MS: right upper extremity          
          insp & palp - RUE                    Wrist:                    
redness                    2017                    None                

 

 Full Exam - Orthopedics                    MS: right upper extremity          
          insp & palp - RUE                    Wrist:                    joint 
swelling                    2017                    None                

 

 Full Exam - Orthopedics                    MS: right upper extremity          
          range of motion - RUE                    Wrist:                    
pain with extension                    2017                    None      
          

 

 Full Exam - General                     Constitutional                     
general appearance                    Development:                    well 
developed                    06/15/2016                    None                

 

 Full Exam - General                     Constitutional                     
general appearance                    Development:                    appears 
stated age                    06/15/2016                    None                

 

 Full Exam - General                     Constitutional                     
general appearance                    Hygiene/Attention to Grooming:           
         good hygiene                    06/15/2016                    None    
            

 

 Full Exam - General                     Eyes                    conjunctiva
/eyelids                    Overall:                    conjunctiva clear      
              06/15/2016                    None                

 

 Full Exam - General                     Eyes                    conjunctiva
/eyelids                    Overall:                    cornea clear           
         06/15/2016                    None                

 

 Full Exam - General                     Eyes                    conjunctiva
/eyelids                    Overall:                    eyelids normal         
           06/15/2016                    None                

 

 Full Exam - General                     Eyes                    pupils and 
irises                    Overall:                    pupils equal, round, 
reactive to light and accomodation                    06/15/2016               
     None                

 

 Full Exam - General                     Ears/Nose/Throat                  
  external ear                    Overall:                    normal appearance
                    06/15/2016                    None                

 

 Full Exam - General                     Ears/Nose/Throat                  
  external nose                    Overall:                    benign 
appearance                    06/15/2016                    None                

 

 Full Exam - General                     Ears/Nose/Throat                  
  otoscopic exam                    Overall:                    external 
auditory canals clear                    06/15/2016                    None    
            

 

 Full Exam - General                     Ears/Nose/Throat                  
  otoscopic exam                    Overall:                    tympanic 
membranes clear                    06/15/2016                    None          
      

 

 Full Exam - General                     Ears/Nose/Throat                  
  hearing assessment                    Overall:                    hearing 
intact bilaterally                    06/15/2016                    None       
         

 

 Full Exam - General                     Ears/Nose/Throat                  
  lips/teeth/gingiva                    Overall:                    benign lips
                    06/15/2016                    None                

 

 Full Exam - General                     Ears/Nose/Throat                  
  lips/teeth/gingiva                    Overall:                    normal 
dentition                    06/15/2016                    None                

 

 Full Exam - General                     Ears/Nose/Throat                  
  lips/teeth/gingiva                    Overall:                    benign 
gingiva                    06/15/2016                    None                

 

 Full Exam - General                     Ears/Nose/Throat                  
  oral cavity/pharynx/larynx                     Overall:                    
oral mucosa clear                    06/15/2016                    None        
        

 

 Full Exam - General                     Ears/Nose/Throat                  
  oral cavity/pharynx/larynx                     Overall:                    
oropharyngeal mucosa clear                    06/15/2016                    
None                

 

 Full Exam - General                     Ears/Nose/Throat                  
  oral cavity/pharynx/larynx                     Overall:                    
hypopharynx benign                    06/15/2016                    None       
         

 

 Full Exam - General                     Ears/Nose/Throat                  
  oral cavity/pharynx/larynx                     Overall:                    no 
masses                    06/15/2016                    None                

 

 Full Exam - General                     Neck                    thyroid   
                 Overall:                    nontender                    06/15/
2016                    None                

 

 Full Exam - General                     Neck                    thyroid   
                 Overall:                    no mass lesions                    
06/15/2016                    None                

 

 Full Exam - General                     Respiratory                    
auscultation                    Overall:                    breath sounds clear 
bilaterally                    06/15/2016                    None              
  

 

 Full Exam - General                     Respiratory                    
respiratory effort/rhythm                    Overall:                    no 
retractions                    06/15/2016                    None              
  

 

 Full Exam - General                     Respiratory                    
respiratory effort/rhythm                    Overall:                    normal 
rate                    06/15/2016                    None                

 

 Full Exam - General                     Cardiovascular                    
inspection of carotid pulses                    Overall:                    
strong, bilaterally equal, no bruits                    06/15/2016             
       None                

 

 Full Exam - General                     Cardiovascular                    
extremities                    Overall:                    no clubbing         
           06/15/2016                    None                

 

 Full Exam - General                     Cardiovascular                    
auscultation of heart                    Overall:                    regular 
rate                    06/15/2016                    None                

 

 Full Exam - General                     Cardiovascular                    
auscultation of heart                    Overall:                    normal 
heart sounds                    06/15/2016                    None             
   

 

 Full Exam - General                     Cardiovascular                    
auscultation of heart                    Overall:                    no murmurs
                    06/15/2016                    None                

 

 Full Exam - General                     Abdomen                    
abdominal exam                    Overall:                    no tenderness    
                06/15/2016                    None                

 

 Full Exam - General                     Abdomen                    
abdominal exam                    Overall:                    normal bowel 
sounds                    06/15/2016                    None                

 

 Full Exam - General                     Abdomen                    liver 
and spleen exam                    Overall:                    no 
hepatosplenomegaly                    06/15/2016                    None       
         

 

 Full Exam - General                     Lymphatic                    neck 
nodes                    Overall:                    anterior cervical chain 
benign                    06/15/2016                    None                

 

 Full Exam - General                     Lymphatic                    neck 
nodes                    Overall:                    posterior cervical chain 
benign                    06/15/2016                    None                

 

 Full Exam - General                     Musculoskeletal                    
digits and nails                    Digits:                    a normal exam   
                 06/15/2016                    None                

 

 Full Exam - General                     Musculoskeletal                    
spine, ribs and pelvis                    Overall:                    spine 
benign                    06/15/2016                    None                

 

 Full Exam - General                     Musculoskeletal                    
spine, ribs and pelvis                    Overall:                    
sacroiliac joint benign                    06/15/2016                    None  
              

 

 Full Exam - General                     Musculoskeletal                    
spine, ribs and pelvis                    Overall:                    good 
posture                    06/15/2016                    None                

 

 Full Exam - General                     Musculoskeletal                    
gait and station                    Overall:                    normal gait    
                06/15/2016                    None                

 

 Full Exam - General                     Musculoskeletal                    
gait and station                    Overall:                    normal station 
                   06/15/2016                    None                

 

 Full Exam - General                     Musculoskeletal                    
head and neck                    Overall:                    head atraumatic   
                 06/15/2016                    None                

 

 Full Exam - General                     Musculoskeletal                    
head and neck                    Overall:                    cervical spine 
benign                    06/15/2016                    None                

 

 Full Exam - General                     Integument                    
inspection of skin                    Overall:                    few scattered 
moles, no gross abnormalities                    06/15/2016                    
dermatofibroma of leg, darkening of skin on lower back.                

 

 Full Exam - General                     Neurologic                    deep 
tendon reflexes                    Overall:                    deep tendon 
reflexes intact                    06/15/2016                    None          
      

 

 Full Exam - General                     Neurologic                    
cranial nerves                    Overall:                    crainial nerves 2 
- 12 grossly intact                    06/15/2016                    None      
          

 

 Full Exam - General                     Psychiatric                    
orientation/consciousness                    Overall:                    
oriented to person, place and time                    06/15/2016               
     None                

 

 Full Exam - General                     Psychiatric                    
mood and affect                    Overall:                    normal mood and 
affect                    06/15/2016                    None                

 

 Full Exam - General                     Constitutional                     
general appearance                    Development:                    well 
developed                    2015                    None                

 

 Full Exam - General                     Constitutional                     
general appearance                    Development:                    appears 
stated age                    2015                    None                

 

 Full Exam - General                     Constitutional                     
general appearance                    Hygiene/Attention to Grooming:           
         good hygiene                    2015                    None    
            

 

 Full Exam - General                     Eyes                    conjunctiva
/eyelids                    Overall:                    conjunctiva clear      
              2015                    None                

 

 Full Exam - General                     Eyes                    conjunctiva
/eyelids                    Overall:                    cornea clear           
         2015                    None                

 

 Full Exam - General                     Eyes                    conjunctiva
/eyelids                    Overall:                    eyelids normal         
           2015                    None                

 

 Full Exam - General                     Eyes                    pupils and 
irises                    Overall:                    pupils equal, round, 
reactive to light and accomodation                    2015               
     None                

 

 Full Exam - General                     Ears/Nose/Throat                  
  otoscopic exam                    Overall:                    external 
auditory canals clear                    2015                    None    
            

 

 Full Exam - General                     Ears/Nose/Throat                  
  otoscopic exam                    Overall:                    tympanic 
membranes clear                    2015                    None          
      

 

 Full Exam - General                     Ears/Nose/Throat                  
  lips/teeth/gingiva                    Overall:                    benign lips
                    2015                    None                

 

 Full Exam - General                     Ears/Nose/Throat                  
  lips/teeth/gingiva                    Overall:                    normal 
dentition                    2015                    None                

 

 Full Exam - General                     Ears/Nose/Throat                  
  oral cavity/pharynx/larynx                     Overall:                    
oral mucosa clear                    2015                    None        
        

 

 Full Exam - General                     Ears/Nose/Throat                  
  oral cavity/pharynx/larynx                     Overall:                    
oropharyngeal mucosa clear                    2015                    
None                

 

 Full Exam - General                     Ears/Nose/Throat                  
  oral cavity/pharynx/larynx                     Overall:                    
hypopharynx benign                    2015                    None       
         

 

 Full Exam - General                     Ears/Nose/Throat                  
  oral cavity/pharynx/larynx                     Overall:                    no 
masses                    2015                    None                

 

 Full Exam - General                     Respiratory                    
auscultation                    Overall:                    breath sounds clear 
bilaterally                    2015                    None              
  

 

 Full Exam - General                     Respiratory                    
respiratory effort/rhythm                    Overall:                    no 
retractions                    2015                    None              
  

 

 Full Exam - General                     Respiratory                    
respiratory effort/rhythm                    Overall:                    normal 
rate                    2015                    None                

 

 Full Exam - General                     Cardiovascular                    
extremities                    Overall:                    no clubbing         
           2015                    None                

 

 Full Exam - General                     Cardiovascular                    
auscultation of heart                    Overall:                    regular 
rate                    2015                    None                

 

 Full Exam - General                     Cardiovascular                    
auscultation of heart                    Overall:                    normal 
heart sounds                    2015                    None             
   

 

 Full Exam - General                     Abdomen                    
abdominal exam                    Overall:                    no tenderness    
                2015                    None                

 

 Full Exam - General                     Abdomen                    
abdominal exam                    Overall:                    normal bowel 
sounds                    2015                    None                

 

 Full Exam - General                     Lymphatic                    neck 
nodes                    Overall:                    anterior cervical chain 
benign                    2015                    None                

 

 Full Exam - General                     Lymphatic                    neck 
nodes                    Overall:                    posterior cervical chain 
benign                    2015                    None                

 

 Full Exam - General                     Musculoskeletal                    
spine, ribs and pelvis                    Overall:                    spine 
benign                    2015                    None                

 

 Full Exam - General                     Musculoskeletal                    
spine, ribs and pelvis                    Overall:                    
sacroiliac joint benign                    2015                    None  
              

 

 Full Exam - General                     Musculoskeletal                    
spine, ribs and pelvis                    Overall:                    good 
posture                    2015                    None                

 

 Full Exam - General                     Musculoskeletal                    
head and neck                    Overall:                    head atraumatic   
                 2015                    None                

 

 Full Exam - General                     Musculoskeletal                    
head and neck                    Overall:                    cervical spine 
benign                    2015                    None                

 

 Full Exam - General                     Neurologic                    deep 
tendon reflexes                    Overall:                    deep tendon 
reflexes intact                    2015                    None          
      

 

 Full Exam - General                     Neurologic                    
cranial nerves                    Overall:                    crainial nerves 2 
- 12 grossly intact                    2015                    None      
          

 

 Full Exam - General                     Psychiatric                    
orientation/consciousness                    Overall:                    
oriented to person, place and time                    2015               
     None                

 

 Full Exam - General                     Psychiatric                    
mood and affect                    Overall:                    normal mood and 
affect                    2015                    None                

 

 Full Exam - General                     Ears/Nose/Throat                  
  external ear                    Overall:                    normal appearance
                    2015                    None                

 

 Full Exam - General                     Ears/Nose/Throat                  
  external nose                    Overall:                    benign 
appearance                    2015                    None                

 

 Full Exam - General                     Ears/Nose/Throat                  
  hearing assessment                    Overall:                    hearing 
intact bilaterally                    2015                    None       
         

 

 Full Exam - General                     Ears/Nose/Throat                  
  lips/teeth/gingiva                    Overall:                    benign 
gingiva                    2015                    None                

 

 Full Exam - General                     Neck                    thyroid   
                 Overall:                    no mass lesions                    
2015                    None                

 

 Full Exam - General                     Neck                    thyroid   
                 Overall:                    nontender                    2015                    None                

 

 Full Exam - General                     Cardiovascular                    
inspection of carotid pulses                    Overall:                    
strong, bilaterally equal, no bruits                    2015             
       None                

 

 Full Exam - General                     Cardiovascular                    
auscultation of heart                    Overall:                    no murmurs
                    2015                    None                

 

 Full Exam - General                     Abdomen                    liver 
and spleen exam                    Overall:                    no 
hepatosplenomegaly                    2015                    None       
         

 

 Full Exam - General                     Musculoskeletal                    
digits and nails                    Digits:                    a normal exam   
                 2015                    None                

 

 Full Exam - General                     Musculoskeletal                    
gait and station                    Overall:                    normal gait    
                2015                    None                

 

 Full Exam - General                     Musculoskeletal                    
gait and station                    Overall:                    normal station 
                   2015                    None                

 

 Full Exam - General                     Integument                    
inspection of skin                    Overall:                    few scattered 
moles, no gross abnormalities                    2015                    
dermatofibroma of leg, darkening of skin on lower back.                

 

 Full Exam - General                     Constitutional                     
general appearance                    Overall:                    well 
nourished                    2013                    None                

 

 Full Exam - General                     Constitutional                     
general appearance                    Overall:                    well 
developed                    2013                    None                

 

 Full Exam - General                     Constitutional                     
general appearance                    Overall:                    in no acute 
distress                    2013                    None                

 

 Full Exam - General                     Psychiatric                    
orientation/consciousness                    Overall:                    
oriented to person, place and time                    2013               
     None                

 

 Full Exam - General                     Psychiatric                    
mood and affect                    Mood:                    happy              
      2013                    None                

 

 Full Exam - General                     Psychiatric                    
mood and affect                    Overall:                    normal mood and 
affect                    2013                    None                

 

 Full Exam - General                     Musculoskeletal                    
gait and station                    Overall:                    normal gait    
                2013                    None                

 

 Full Exam - General                     Musculoskeletal                    
gait and station                    Overall:                    normal station 
                   2013                    None                

 

 Full Exam - General                     Cardiovascular                    
auscultation of heart                    Overall:                    regular 
rate                    2013                    None                

 

 Full Exam - General                     Cardiovascular                    
auscultation of heart                    Overall:                    normal 
heart sounds                    2013                    None             
   

 

 Full Exam - General                     Cardiovascular                    
auscultation of heart                    Overall:                    no murmurs
                    2013                    None                

 

 Full Exam - General                     Respiratory                    
respiratory effort/rhythm                    Overall:                    normal 
rate                    2013                    None                

 

 Full Exam - General                     Respiratory                    
respiratory effort/rhythm                    Overall:                    no 
retractions                    2013                    None              
  

 

 Full Exam - General                     Respiratory                    
auscultation                    Overall:                    breath sounds clear 
bilaterally                    2013                    None              
  

 

 Full Exam - General                     Musculoskeletal                    
lower extremity                    Inspection -  ankle:                    a 
normal exam                    2013                    None              
  

 

 Full Exam - General                     Musculoskeletal                    
lower extremity                    Palpation -  ankle:                    a 
normal exam                    2013                    None              
  

 

 Full Exam - General                     Musculoskeletal                    
lower extremity                    Palpation -  ankle:                    
tender @ achilles tendon                    2013                    None 
               

 

 Full Exam - General                     Musculoskeletal                    
lower extremity                    Palpation -  ankle:                    
tender @ plantar fascia insertion                    2013                
    None                

 

 Full Exam - General                     Ears/Nose/Throat                  
  oral cavity/pharynx/larynx                     Overall:                    
oropharyngeal mucosa clear                    2013                    
None                

 

 Full Exam - General 1995                    Ears/Nose/Throat                  
  oral cavity/pharynx/larynx                     Overall:                    no 
masses                    2013                    None                

 

 Full Exam - General 1995                    Ears/Nose/Throat                  
  oral cavity/pharynx/larynx                     Overall:                    
oral mucosa clear                    2013                    None        
        

 

 Full Exam - General                     Ears/Nose/Throat                  
  otoscopic exam                    Overall:                    tympanic 
membranes clear                    2013                    None          
      

 

 Full Exam - General                     Ears/Nose/Throat                  
  otoscopic exam                    Overall:                    external 
auditory canals clear                    2013                    None    
            

 

 Full Exam - General                     Eyes                    pupils and 
irises                    Overall:                    pupils equal, round, 
reactive to light and accomodation                    2013               
     None                

 

 Full Exam - General                     Constitutional                     
general appearance                    Overall:                    well 
developed                    2013                    None                

 

 Full Exam - General                     Constitutional                     
general appearance                    Overall:                    in no acute 
distress                    2013                    None                

 

 Full Exam - General                     Constitutional                     
general appearance                    Overall:                    well 
nourished                    2013                    None                

 

 Full Exam - General                     Respiratory                    
auscultation                    Overall:                    breath sounds clear 
bilaterally                    2013                    None              
  

 

 Full Exam - General                     Respiratory                    
respiratory effort/rhythm                    Overall:                    no 
retractions                    2013                    None              
  

 

 Full Exam - General                     Respiratory                    
respiratory effort/rhythm                    Overall:                    normal 
rate                    2013                    None                

 

 Full Exam - General                     Cardiovascular                    
auscultation of heart                    Overall:                    regular 
rate                    2013                    None                

 

 Full Exam - General                     Cardiovascular                    
auscultation of heart                    Overall:                    normal 
heart sounds                    2013                    None             
   

 

 Full Exam - General                     Cardiovascular                    
auscultation of heart                    Overall:                    no murmurs
                    2013                    None                

 

 Full Exam - General                     Integument                    
inspection of skin                    Dermatitis:                    erythema  
                  2013                    None                

 

 Full Exam - General                     Integument                    
inspection of skin                    Dermatitis:                    dryness/
flaking                    2013                    None                

 

 Full Exam - General                     Integument                    
inspection of skin                    Dermatitis:                    scaling   
                 2013                    None                

 

 Full Exam - General                     Integument                    
inspection of skin                    Dermatitis:                    thickened 
                   2013                    None                

 

 Full Exam - General                     Integument                    
inspection of skin                    Dermatitis:                    
lichenification                    2013                    None          
      

 

 Full Exam - General                     Integument                    
inspection of skin                    Location:                    right arm   
                 2013                    None                

 

 Full Exam - General                     Neurologic                    gait
                    Overall:                    no ataxia, no unsteadiness     
               2013                    None                

 

 Full Exam - General                     Psychiatric                    
orientation/consciousness                    Overall:                    
oriented to person, place and time                    2013               
     None                

 

 Full Exam - General                     Psychiatric                    
mood and affect                    Overall:                    normal mood and 
affect                    2013                    None                

 

 Full Exam - General                     Psychiatric                    
mood and affect                    Mood:                    happy              
      2013                    None                

 

 Full Exam - General                     Integument                    
inspection of skin                    Location:                    left hand   
                 2013                    None                

 

 Full Exam - General                     Integument                    
inspection of skin                    Location:                    right hand  
                  2013                    None                

 

 Full Exam - General                     Constitutional                     
general appearance                    Overall:                    well 
developed                    2012                    None                

 

 Full Exam - General                     Constitutional                     
general appearance                    Overall:                    in no acute 
distress                    2012                    None                

 

 Full Exam - General                     Constitutional                     
general appearance                    Overall:                    well 
nourished                    2012                    None                

 

 Full Exam - General                     Respiratory                    
auscultation                    Overall:                    breath sounds clear 
bilaterally                    2012                    None              
  

 

 Full Exam - General                     Respiratory                    
respiratory effort/rhythm                    Overall:                    no 
retractions                    2012                    None              
  

 

 Full Exam - General                     Respiratory                    
respiratory effort/rhythm                    Overall:                    normal 
rate                    2012                    None                

 

 Full Exam - General                     Cardiovascular                    
auscultation of heart                    Overall:                    regular 
rate                    2012                    None                

 

 Full Exam - General 1995                    Cardiovascular                    
auscultation of heart                    Overall:                    normal 
heart sounds                    2012                    None             
   

 

 Full Exam - General                     Cardiovascular                    
auscultation of heart                    Overall:                    no murmurs
                    2012                    None                

 

 Full Exam - General 1995                    Integument                    
inspection of skin                    Dermatitis:                    erythema  
                  2012                    None                

 

 Full Exam - General 1995                    Integument                    
inspection of skin                    Dermatitis:                    dryness/
flaking                    2012                    None                

 

 Full Exam - General                     Integument                    
inspection of skin                    Dermatitis:                    scaling   
                 2012                    None                

 

 Full Exam - General                     Integument                    
inspection of skin                    Dermatitis:                    thickened 
                   2012                    None                

 

 Full Exam - General                     Integument                    
inspection of skin                    Dermatitis:                    
lichenification                    2012                    None          
      

 

 Full Exam - General                     Integument                    
inspection of skin                    Dermatitis:                    
excoriation                    2012                    over dorsum of the 
right hand                

 

 Full Exam - General                     Neurologic                    gait
                    Overall:                    no ataxia, no unsteadiness     
               2012                    None                

 

 Full Exam - General                     Psychiatric                    
orientation/consciousness                    Overall:                    
oriented to person, place and time                    2012               
     None                

 

 Full Exam - General                     Psychiatric                    
mood and affect                    Overall:                    normal mood and 
affect                    2012                    None                

 

 Full Exam - General                     Psychiatric                    
mood and affect                    Mood:                    happy              
      2012                    None                

 

 Full Exam - General                     Ears/Nose/Throat                  
  otoscopic exam                    Overall:                    external 
auditory canals clear                    2012                    None    
            

 

 Full Exam - General 1995                    Ears/Nose/Throat                  
  otoscopic exam                    Overall:                    tympanic 
membranes clear                    2012                    None          
      

 

 Full Exam - General 1995                    Ears/Nose/Throat                  
  oral cavity/pharynx/larynx                     Overall:                    
oral mucosa clear                    2012                    None        
        

 

 Full Exam - General                     Integument                    
inspection of skin                    Dermatitis:                    scaling   
                 2012                    None                

 

 Full Exam - General                     Integument                    
inspection of skin                    Dermatitis:                    thickened 
                   2012                    None                

 

 Full Exam - General                     Integument                    
inspection of skin                    Dermatitis:                    
lichenification                    2012                    None          
      

 

 Full Exam - General                     Neurologic                    gait
                    Overall:                    no ataxia, no unsteadiness     
               2012                    None                

 

 Full Exam - General                     Psychiatric                    
orientation/consciousness                    Overall:                    
oriented to person, place and time                    2012               
     None                

 

 Full Exam - General                     Psychiatric                    
mood and affect                    Overall:                    normal mood and 
affect                    2012                    None                

 

 Full Exam - General                     Psychiatric                    
mood and affect                    Mood:                    happy              
      2012                    None                

 

 Full Exam - General                     Integument                    
inspection of skin                    Location:                    right arm   
                 2012                    None                

 

 Full Exam - General                     Integument                    
inspection of skin                    Location:                    left arm    
                2012                    None                

 

 Full Exam - General                     Constitutional                     
general appearance                    Overall:                    well 
developed                    2012                    None                

 

 Full Exam - General                     Constitutional                     
general appearance                    Overall:                    in no acute 
distress                    2012                    None                

 

 Full Exam - General                     Constitutional                     
general appearance                    Overall:                    well 
nourished                    2012                    None                

 

 Full Exam - General                     Respiratory                    
auscultation                    Overall:                    breath sounds clear 
bilaterally                    2012                    None              
  

 

 Full Exam - General                     Respiratory                    
respiratory effort/rhythm                    Overall:                    no 
retractions                    2012                    None              
  

 

 Full Exam - General                     Respiratory                    
respiratory effort/rhythm                    Overall:                    normal 
rate                    2012                    None                

 

 Full Exam - General                     Cardiovascular                    
auscultation of heart                    Overall:                    regular 
rate                    2012                    None                

 

 Full Exam - General                     Cardiovascular                    
auscultation of heart                    Overall:                    normal 
heart sounds                    2012                    None             
   

 

 Full Exam - General                     Cardiovascular                    
auscultation of heart                    Overall:                    no murmurs
                    2012                    None                

 

 Full Exam - General                     Integument                    
inspection of skin                    Dermatitis:                    erythema  
                  2012                    None                

 

 Full Exam - General                     Integument                    
inspection of skin                    Dermatitis:                    dryness/
flaking                    2012                    None                

 

 Full Exam - General                     Psychiatric                    
orientation/consciousness                    Overall:                    
oriented to person, place and time                    2012               
     None                

 

 Full Exam - General                     Psychiatric                    
mood and affect                    Mood:                    happy              
      2012                    None                

 

 Full Exam - General                     Psychiatric                    
mood and affect                    Overall:                    normal mood and 
affect                    2012                    None                

 

 Full Exam - General                     Neurologic                    gait
                    Overall:                    no ataxia, no unsteadiness     
               2012                    None                

 

 Full Exam - General                     Integument                    
inspection of skin                    Dermatitis:                    erythema  
                  2012                    None                

 

 Full Exam - General                     Integument                    
inspection of skin                    Dermatitis:                    dryness/
flaking                    2012                    None                

 

 Full Exam - General                     Integument                    
inspection of skin                    Dermatitis:                    scaling   
                 2012                    None                

 

 Full Exam - General                     Integument                    
inspection of skin                    Dermatitis:                    thickened 
                   2012                    None                

 

 Full Exam - General                     Integument                    
inspection of skin                    Dermatitis:                    
lichenification                    2012                    None          
      

 

 Full Exam - General                     Integument                    
inspection of skin                    Dermatitis:                    
excoriation                    2012                    over dorsum of the 
right hand and over the right mid abdomen                

 

 Full Exam - General                     Integument                    
palpation                    Hand:                    tender                    
2012                    None                

 

 Full Exam - General                     Cardiovascular                    
auscultation of heart                    Overall:                    regular 
rate                    2012                    None                

 

 Full Exam - General                     Cardiovascular                    
auscultation of heart                    Overall:                    normal 
heart sounds                    2012                    None             
   

 

 Full Exam - General                     Cardiovascular                    
auscultation of heart                    Overall:                    no murmurs
                    2012                    None                

 

 Full Exam - General                     Respiratory                    
auscultation                    Overall:                    breath sounds clear 
bilaterally                    2012                    None              
  

 

 Full Exam - General                     Respiratory                    
respiratory effort/rhythm                    Overall:                    normal 
rate                    2012                    None                

 

 Full Exam - General                     Respiratory                    
respiratory effort/rhythm                    Overall:                    no 
retractions                    2012                    None              
  

 

 Full Exam - General                     Constitutional                     
general appearance                    Overall:                    well 
nourished                    2012                    None                

 

 Full Exam - General                     Constitutional                     
general appearance                    Overall:                    well 
developed                    2012                    None                

 

 Full Exam - General                     Constitutional                     
general appearance                    Overall:                    in no acute 
distress                    2012                    None                



                                                                              



Procedures

                      





 Procedure                    Codes                    Date                

 

                     TRIAMCINOLONE ACET INJ NOS                                
      CPT-4:                     2017                

 

                     TRIAMCINOLONE ACET INJ NOS                                
      CPT-4:                     05/15/2017                

 

                     ROCEPHIN,  MG                                      
CPT-4:                     2012                

 

                     KETOROLAC TROMETHAMINE INJ                                
      CPT-4:                     2012                

 

                     THER/PROPH/DIAG INJ SC/IM                                 
     CPT-4: 74053                    2012                

 

                     TRIAMCINOLONE ACET INJ NOS                                
      CPT-4:                     2012                

 

                     THER/PROPH/DIAG INJ SC/IM                                 
     CPT-4: 92018                    2012                



                                                                               
                                                           



Vital Signs

                      





 Date                    Vital                









 2018                                        Blood Pressure 1: 130/70 Code
: 8480-6                                                          BMI: 26.9 Code
: 56132-5                                                          Heart Rate 1
: 83 bpm                                                          Height: 6'2" 
                                                         SpO2: 98%             
                                             Weight: 212 lbs                   
                

 

 2017                                        Blood Pressure 1: 122/74 Code
: 8480-6                                                          BMI: 28.1 Code
: 17762-3                                                          Heart Rate 1
: 88 bpm                                                          Height: 6'2" 
                                                         SpO2: 99%             
                                             Weight: 222 lbs                   
                









 05/15/2017                                        Blood Pressure 1: 128/80 Code
: 8480-6                                                          BMI: 29.4 Code
: 37046-7                                                          Heart Rate 1
: 92 bpm                                                          Height: 6'2" 
                                                         SpO2: 98%             
                                             Temperature: 36.6 (C) / 97.8 (F)  
                                                        Weight: 232 lbs        
                           









 2017                                        Blood Pressure 1: 124/78 Code
: 8480-6                                                          BMI: 29.4 Code
: 04928-8                                                          Heart Rate 1
: 78 bpm                                                          Height: 6'2" 
                                                         SpO2: 96%             
                                             Weight: 232 lbs                   
                

 

 2017                                        Blood Pressure 1: 128/82 Code
: 8480-6                                                          BMI: 31.0 Code
: 89365-2                                                          Heart Rate 1
: 80 bpm                                                          Height: 6'2" 
                                                         SpO2: 98%             
                                             Weight: 245 lbs                   
                

 

 06/15/2016                                        Blood Pressure 1: 126/74 Code
: 8480-6                                                          BMI: 29.9 Code
: 52442-5                                                          Heart Rate 1
: 71 bpm                                                          Height: 6'2" 
                                                         SpO2: 98%             
                                             Weight: 236 lbs                   
                









 2015                                        Blood Pressure 1: 102/74 Code
: 8480-6                                                          BMI: 30.4 Code
: 01870-9                                                          Heart Rate 1
: 76 bpm                                                          Height: 6'2" 
                                                         Weight: 240 lbs       
                            









 2013                                        Blood Pressure 1: 122/82 Code
: 8480-6                                                          BMI: 32.8 Code
: 17681-6                                                          Heart Rate 1
: 72 bpm                                                          Height: 6'   
                                                       Respiratory Rate: 16 bpm
                                                          Weight: 245 lbs      
                             









 2013                                        Blood Pressure 1: 126/74 Code
: 8480-6                                                          BMI: 31.6 Code
: 65772-8                                                          Heart Rate 1
: 80 bpm                                                          Height: 6'2" 
                                                         Weight: 246 lbs       
                            

 

 2012                                        Blood Pressure 1: 126/74 Code
: 8480-6                                                          Heart Rate 1: 
72 bpm                                                          Respiratory Rate
: 16 bpm                                                          Temperature: 
36.8 (C) / 98.3 (F)                                                          
Weight: 240 lbs                                   

 

 2012                                        Blood Pressure 1: 130/80 Code
: 8480-6                                                          BMI: 30.1 Code
: 11129-7                                                          Heart Rate 1
: 62 bpm                                                          Height: 6'2" 
                                                         Weight: 237 lbs 8 oz  
                                









 2012                                        Blood Pressure 1: 140/72 Code
: 8480-6                                                          BMI: 32.1 Code
: 90179-5                                                          Heart Rate 1
: 68 bpm                                                          Height: 6'   
                                                       Respiratory Rate: 16 bpm
                                                          Weight: 237 lbs      
                             



                                                                               
                                                                               
                                        



Functional Status

          No Functional Status data                                            
              



History of Present Illness

                      





 Symptom Name                    Status                    Result              
      Effective Date                    Notes                

 

 dizziness                    Quality                    constant              
      2018                    None                

 

 dizziness                    Quality                    acute                 
   2018                    None                

 

 dizziness                    Onset and Resolution                    sudden in 
onset                    2018                    None                

 

 dizziness                    Onset of Symptom                    5 days ago   
                 2018                    None                

 

 dizziness                    Frequency of Episodes                    daily   
                 2018                    None                

 

 dizziness                    Triggers                    no known associated 
factors                    2018                    None                

 

 dizziness                    Pertinent Findings                    nausea     
               2018                    None                

 

 wrist pain                    Location                    on the left         
           2017                    None                

 

 wrist pain                    Quality                    constant             
       2017                    None                

 

 wrist pain                    Onset and Resolution                    sudden 
in onset                    2017                    None                

 

 wrist pain                    Onset of Symptom                    24 hours ago
                    2017                    None                

 

 cough                    Location                    in the lung              
      05/15/2017                    None                

 

 cough                    Quality                    constant                  
  05/15/2017                    None                

 

 cough                    Quality                    hacking                    
05/15/2017                    None                

 

 cough                    Quality                    productive                
    05/15/2017                    None                

 

 cough                    Onset and Resolution                    sudden in 
onset                    05/15/2017                    None                

 

 cough                    Onset of Symptom                    2 weeks ago      
              05/15/2017                    None                

 

 cough                    Frequency of Episodes                    daily       
             05/15/2017                    None                

 

 cough                    Pertinent Findings                    Denies chest 
discomfort                    05/15/2017                    None                

 

 cough                    Pertinent Findings                    hoarseness     
               05/15/2017                    None                

 

 cough                    Pertinent Findings                    nasal 
congestion                    05/15/2017                    None                

 

 cough                    Pertinent Findings                    sputum 
production                    05/15/2017                    None                

 

 sinus congestion                    Location                    frontal 
sinuses                    05/15/2017                    None                

 

 sinus congestion                    Quality                    pressure       
             05/15/2017                    None                

 

 sinus congestion                    Onset and Resolution                    
sudden in onset                    05/15/2017                    None          
      

 

 sinus congestion                    Onset of Symptom                    2 
weeks ago                    05/15/2017                    None                

 

 sinus congestion                    Frequency of Episodes                    
daily                    05/15/2017                    None                

 

 sinus congestion                    Pertinent Findings                    
cough                    05/15/2017                    None                

 

 sinus congestion                    Pertinent Findings                    
hoarseness                    05/15/2017                    None                

 

 dizziness                    Quality                    acute                 
   2017                    None                

 

 dizziness                    Onset and Resolution                    sudden in 
onset                    2017                    None                

 

 dizziness                    Limitation on Activities                    
moderately limits activities                    2017                    
None                

 

 dizziness                    Triggers                    head turning         
           2017                    None                

 

 dizziness                    Pertinent Findings                    blurred 
vision                    2017                    None                

 

 dizziness                    Pertinent Findings                    
lightheadedness                    2017                    None          
      

 

 dizziness                    Exacerbating Factors                    turning 
the head                    2017                    None                

 

 dizziness                    Exacerbating Factors                    
medication                    2017                    meloxicam          
      

 

 dizziness                    Onset of Symptom                    5 days ago   
                 2017                    None                

 

 dizziness                    Frequency of Episodes                    
decreasing                    2017                    None                

 

 wrist pain                    Location                    on the right        
            2017                    None                

 

 wrist pain                    Quality                    acute                
    2017                    None                

 

 wrist pain                    Limitation on Activities                    
moderately limits activities                    2017                    
None                

 

 wrist pain                    Pertinent Findings                    Denies 
numbness                    2017                    None                

 

 wrist pain                    Pertinent Findings                    redness   
                 2017                    None                

 

 wrist pain                    Pertinent Findings                    stiffness 
                   2017                    None                

 

 wrist pain                    Pertinent Findings                    swelling  
                  2017                    None                

 

 ankle pain                    Location                    on the right        
            06/15/2016                    None                

 

 ankle pain                    Quality                    dull pain            
        06/15/2016                    None                

 

 ankle pain                    Quality                    aching               
     06/15/2016                    None                

 

 ankle pain                    Location                    achilles tendon     
               06/15/2016                    None                

 

 ankle pain                    Onset and Resolution                    sudden 
in onset                    06/15/2016                    None                

 

 ankle pain                    Onset of Symptom                    2 weeks ago 
                   06/15/2016                    None                

 

 ankle pain                    Frequency of Episodes                    daily  
                  06/15/2016                    None                

 

 ankle pain                    Sports Participation                    
basketball                    06/15/2016                    None                

 

 ankle pain                    Sports Participation                    baseball
                    06/15/2016                    None                

 

 ankle pain                    Sports Participation                    running 
                   06/15/2016                    None                

 

 ankle pain                    Pertinent Findings                    stiffness 
                   06/15/2016                    None                

 

 ankle pain                    Pertinent Findings                    pain with 
movement                    06/15/2016                    None                

 

 well man exam (18-39 years)                    Birth Control                  
  male condom                    06/15/2016                    None            
    

 

 well man exam (18-39 years)                    Nutrition and Exercise         
           normal weight                    06/15/2016                    None 
               

 

 well man exam (18-39 years)                    Nutrition and Exercise         
           balanced nutrition                    06/15/2016                    
None                

 

 well man exam (18-39 years)                    Nutrition and Exercise         
           regular diet                    06/15/2016                    None  
              

 

 well man exam (18-39 years)                    Nutrition and Exercise         
           moderate exercise                    06/15/2016                    
None                

 

 skin lesion                    Onset of Symptom                    _ years ago
                    2015                    None                

 

 skin lesion                    Quality                    flat                
    2015                    Denies pain.                  

 

 well man exam (18-39 years)                    Sexual Activity                
    experiences sexual satisfaction                    2015              
      None                

 

 well man exam (18-39 years)                    Sexual Activity                
    is monogamous                    2015                    None        
        

 

 well man exam (18-39 years)                    Birth Control                  
  regular use                    2015                    None            
    

 

 well man exam (18-39 years)                    Lifestyle                    
family supportive of relationship                    2015                
    None                

 

 well man exam (18-39 years)                    Lifestyle                    
normal amount of stress                    2015                    None  
              

 

 well man exam (18-39 years)                    Lifestyle                    
normal sleep patterns                    2015                    None    
            

 

 well man exam (18-39 years)                    Lifestyle                    
regular seatbelt use                    2015                    None     
           

 

 well man exam (18-39 years)                    Lifestyle                    
satisfactory marriage/partner relationship                    2015       
             None                

 

 well man exam (18-39 years)                    Lifestyle                    
satisfactory work experience                    2015                    
None                

 

 well man exam (18-39 years)                    Nutrition and Exercise         
           balanced nutrition                    2015                    
None                

 

 well man exam (18-39 years)                    Nutrition and Exercise         
           moderate exercise                    2015                    
None                

 

 well man exam (18-39 years)                    Reproductive System Development
                    normal puberty                    2015               
     None                

 

 well man exam (18-39 years)                    Health Guidance                
    cholesterol level and lipid panel                    2015            
        None                

 

 well man exam (18-39 years)                    Health Guidance                
    regular exercise                    2015                    None     
           

 

 well man exam (18-39 years)                    Health Guidance                
    safety belt use                    2015                    None      
          

 

 well man exam (18-39 years)                    Health Guidance                
    tobacco, drugs and alcohol avoidance                    2015         
           None                

 

 ankle pain                    Location                    on the right        
            2013                    None                

 

 ankle pain                    Quality                    sharp pain           
         2013                    alternating dull pain.  states it is 
worse after sitting and getting up and walking agin                

 

 ankle pain                    Pertinent Findings                    Denies 
catching                    2013                    None                

 

 ankle pain                    Pertinent Findings                    Denies 
clicking                    2013                    None                

 

 ankle pain                    Pertinent Findings                    Denies 
numbness                    2013                    None                

 

 ankle pain                    Pertinent Findings                    Denies 
redness                    2013                    None                

 

 ankle pain                    Pertinent Findings                    stiffness 
                   2013                    None                

 

 ankle pain                    Pertinent Findings                    Denies 
swelling                    2013                    None                

 

 ankle pain                    Onset of Symptom                    2-3 weeks 
ago                    2013                    states he thinks he 
originally injured it while playing football with friends last fall            
    

 

 ankle pain                    Severity                    mild                
    2013                    None                

 

 ankle pain                    Significant Medical Conditions                  
  degenerative joint disease                    2013                    
None                

 

 skin lesion                    Quality                    chronic             
       2013                    None                

 

 skin lesion                    Quality                    flat                
    2013                    None                

 

 skin lesion                    Quality                    non-tender          
          2013                    None                

 

 skin lesion                    Quality                    worsening           
         2013                    None                

 

 skin lesion                    Location                    left arm           
         2013                    also on hands bilat.                

 

 skin lesion                    Onset and Resolution                    ongoing
                    2013                    None                

 

 skin lesion                    Pertinent Findings                    Denies 
cough                    2013                    None                

 

 skin lesion                    Pertinent Findings                    Denies 
ecchymotic                    2013                    None                

 

 skin lesion                    Alleviating Factors                    
medication                    2013                    had ointment for 
lesions that helps temporarily                

 

 skin lesion                    Severity                    moderate           
         2013                    None                

 

 skin lesion                    Frequency of Episodes                    
unchanged                    2013                    None                

 

 skin lesion                    Triggers                    no known associated 
factors                    2013                    None                

 

 headache                    Onset and Resolution                    sudden in 
onset                    2012                    None                

 

 headache                    Onset of Symptom                    3 days ago    
                2012                    None                

 

 sore throat                    Quality                    acute               
     2012                    None                

 

 sore throat                    Pertinent Findings                    cough    
                2012                    None                

 

 rash                    Location-Major                    on the hands        
            2012                    on right hand                

 

 rash                    Onset of Symptom                    1 month ago       
             2012                    recurrence of former rash, using 
itrakonazole with no results                

 

 headache                    Location                    diffusely             
       2012                    None                

 

 headache                    Quality                    acute                  
  2012                    None                

 

 headache                    Onset and Resolution                    ongoing   
                 2012                    None                

 

 headache                    Limitation on Activities                    does 
not limit activities                    2012                    None     
           

 

 headache                    Frequency of Episodes                    
increasing                    2012                    None                

 

 headache                    Significant Medical Conditions                    
allergic rhinitis                    2012                    None        
        

 

 headache                    Triggers                    no known associated 
factors                    2012                    None                

 

 sore throat                    Location                    diffusely          
          2012                    None                

 

 sore throat                    Onset and Resolution                    ongoing
                    2012                    None                

 

 sore throat                    Limitation on Activities                    
does not limit oral intake                    2012                    
None                

 

 sore throat                    Significant Medical Conditions                 
   allergic rhinitis                    2012                    None     
           

 

 sore throat                    Triggers                    no known associated 
factors                    2012                    None                

 

 flare up of rash                    Alleviating Factors                    no 
alleviating factors                    2012                    None      
          

 

 flare up of rash                    Pertinent Findings                    
history of similar rash                    2012                    None  
              

 

 flare up of rash                    Pertinent Findings                    
itching                    2012                    None                

 

 flare up of rash                    Pertinent Findings                    
tenderness                    2012                    None                

 

 rash                    Location-Major                    on the arms         
           2012                    None                

 

 rash                    Quality                    expanding                  
  2012                    None                

 

 rash                    Color                    red                    2012                    None                

 

 flare up of rash                    Quality                    dry            
        2012                    None                

 

 flare up of rash                    Quality                    flaking        
            2012                    None                

 

 flare up of rash                    Onset and Resolution                    
gradual in onset                    2012                    None         
       

 

 flare up of rash                    Limitation on Activities                  
  does not limit activities                    2012                    
None                

 

 flare up of rash                    Severity                    mild          
          2012                    None                

 

 flare up of rash                    Severity                    worsening     
               2012                    None                

 

 flare up of rash                    Triggers                    no known 
triggers                    2012                    None                

 

 flare up of rash                    Location-Extremities                    on 
the right hand                    2012                    None           
     

 

 flare up of rash                    Quality                    dry            
        2012                    None                

 

 flare up of rash                    Quality                    flaking        
            2012                    None                

 

 flare up of rash                    Onset of Symptom                    3 
months ago                    2012                    None                

 

 flare up of rash                    Color                    black            
        2012                    None                

 

 flare up of rash                    Color                    erythematous     
               2012                    None                

 

 flare up of rash                    Onset and Resolution                    
gradual in onset                    2012                    None         
       

 

 flare up of rash                    Limitation on Activities                  
  does not limit activities                    2012                    
None                

 

 flare up of rash                    Severity                    mild          
          2012                    None                

 

 flare up of rash                    Severity                    worsening     
               2012                    None                

 

 flare up of rash                    Prior Treatments                    
partially responsive to treatment                    2012                
    None                

 

 flare up of rash                    Triggers                    no known 
triggers                    2012                    None                

 

 flare up of rash                    Alleviating Factors                    no 
alleviating factors                    2012                    None      
          

 

 flare up of rash                    Pertinent Findings                    
history of similar rash                    2012                    None  
              

 

 flare up of rash                    Pertinent Findings                    
itching                    2012                    None                

 

 flare up of rash                    Pertinent Findings                    
tenderness                    2012                    None                



                                                                    



Advance Directives

          No Advance Directive data                                            
                        



Encounters

                      





 Encounter                    Performer                    Location            
        Codes                    Date                

 

                      EST. PATIENT, LEVEL IV

Diagnosis: Abnormal weight loss[ICD10: R63.4]

Diagnosis: Benign paroxysmal vertigo, bilateral[ICD10: H81.13]

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

Diagnosis: Other malaise[ICD10: R53.81]

Diagnosis: Other fatigue[ICD10: R53.83]                                      
Franca Landrum MD, [facility]                    CPT-4
: 29954                    2018                

 

                     17856 EST. PATIENT, LEVEL III

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                                   
   Franca Landrum MD, [facility]                    CPT
-4: 45166                    2017                

 

                     84762 EST. PATIENT, LEVEL IV

Diagnosis: Acute bronchitis due to other specified organisms[ICD10: J20.8]

Diagnosis: Other allergic rhinitis[ICD10: J30.89]                              
        Franca Landrum MD, [facility]                 
   CPT-4: 05726                    05/15/2017                

 

                     ([id number]) 93737 EST. PATIENT, LEVEL III

Diagnosis: Benign paroxysmal vertigo, bilateral[ICD10: H81.13]                 
                     [facility]
                    CPT-4: 17490                    2017                

 

                     75094 EST. PATIENT, LEVEL III

Diagnosis: Pain in right wrist[ICD10: M25.531]                                 
     [facility]                    
CPT-4: 04213                    2017                

 

                     ([id number]) PREV VISIT EST AGE 18-39

Diagnosis: Encounter for general adult medical examination without abnormal 
findings[ICD10: Z00.00]                                      Meghan Landrum MD, [facility]                    CPT-4: 81897         
           06/15/2016                

 

                     ([id number]) PREV VISIT EST AGE 18-39

Diagnosis: PREVENTIVE PHYSICAL EXAM[ICD9: V70.0]                               
       Meghan Landrum MD, [facility]                
    CPT-4: 48183                    2015                

 

                     ([id number]) 85575 EST. PATIENT, LEVEL III

Diagnosis: ACHILLES TENDINITIS[ICD9: 726.71]                                   
   [facility]                    
CPT-4: 78382                    2013                

 

                     ([id number]) 59661 EST. PATIENT, LEVEL III

Diagnosis: Rash[ICD9: 782.1]

Diagnosis: PRURITIC DISORDER[ICD9: 698.9]                                      
Klarissa Landrum MD, [facility]                    
CPT-4: 17819                    2013                

 

                     ([id number]) 22161 EST. PATIENT, LEVEL III

Diagnosis: ACUTE URI[ICD9: 465.9]

Diagnosis: Rash[ICD9: 782.1]                                      [facility]                    CPT-4: 
65525                    2012                

 

                     ([id number]) 97497 EST. PATIENT, LEVEL III

Diagnosis: PRURITIC DISORDER[ICD9: 698.9]

Diagnosis: NONSPECIF SKIN ERUPT NEC[ICD9: 782.1]                               
       [facility]                
    CPT-4: 91943                    2012                

 

                     72774 EST. PATIENT, LEVEL IV

Diagnosis: Rash[ICD9: 782.1]

Diagnosis: Localized pruritus[ICD9: 698.9]

Diagnosis: Elevated blood pressure[ICD9: 796.2]                                
      Meghan Landrum MD, [facility]                 
   CPT-4: 76400                    2012                



                                                                               
                                                                               
                                                                      



Plan of Care

                      





 Planned Activity                    Notes                    Codes            
        Status                    Date                

 

 Visit Plan:                     BPPV - Benign Paroxysmal Positional Vertigo - 
discussed diagnosis with the patient, offered the pt the appropriate additional 
information in hand-out. Pt instructed in home exercises to help alleviate and 
prevent future recurrent episodes of vertigo. Pt informed that if symptoms 
worsen, call the office for further instructions/medication interventions. RA - 
Pt is to follow with specialist at  - pt is to notify clinic of any changes 
in current treatment plan or with any acute questions or concerns. Weight loss 
- will check labs and treat as indicated.

                                                             2018        
        

 

 Patient Education: Patient Medication Summary                                 
                            Completed                    2018            
    

 

 Visit Plan:                     RA - acute flare - will send RX - pt has an 
appointment with a new rheumatologist in January. Pt is to notify clinic if 
symptoms do not improve, if they worsen, or with any acute changes, questions, 
or concerns.

                                                             2017        
        

 

 Visit Plan:                     RA - acute flare - will send RX - pt has an 
appointment with a new rheumatologist in January. Pt is to notify clinic if 
symptoms do not improve, if they worsen, or with any acute changes, questions, 
or concerns.

                                                             2017        
        

 

 Appointment: Franca Casper 

WPtel:[phone]

 [address] [facility]

                                                             (30 min) Complex
                    2017                

 

 Patient Education: Patient Medication Summary                                 
                            Completed                    2017            
    

 

 Visit Plan:                     Bronchitis - acute case of bronchitis 
identified. Pt has been given antibiotics, steroid as appropriate, and pt has 
been instructed to call if symptoms are not improved, or if symptoms acutely 
worsen. Allergies - chronic - recommended pt to use allergy medication as 
prescribed. Pt has been counseled as to the appropriate use of the medication. 
Pt to call if allergy symptoms are not controlled with the medication. If using 
nasal spray, instructions as follows: Nasal spray- use twice daily, one spray 
per nostril twice daily, after 30 minutes, rinse out nose with saline spray.. 
Use opposite hand per nostril to spray in the nasal steroid allergy spray.

                                                             05/15/2017        
        

 

 Visit Plan:                     Bronchitis - acute case of bronchitis 
identified. Pt has been given antibiotics, steroid as appropriate, and pt has 
been instructed to call if symptoms are not improved, or if symptoms acutely 
worsen. Allergies - chronic - recommended pt to use allergy medication as 
prescribed. Pt has been counseled as to the appropriate use of the medication. 
Pt to call if allergy symptoms are not controlled with the medication. If using 
nasal spray, instructions as follows: Nasal spray- use twice daily, one spray 
per nostril twice daily, after 30 minutes, rinse out nose with saline spray.. 
Use opposite hand per nostril to spray in the nasal steroid allergy spray.

                                                             05/15/2017        
        

 

 Appointment: Franca Casper 

WPtel:[phone]

 [address] [facility]

                                                             (15 min) 
Moderate                    05/15/2017                

 

 Patient Education: Patient Medication Summary                                 
                            Completed                    05/15/2017            
    

 

 Visit Plan:                     BPPV - Benign Paroxysmal Positional Vertigo - 
discussed diagnosis with the patient, offered the pt the appropriate additional 
information in hand-out. Pt instructed in home exercises to help alleviate and 
prevent future recurrent episodes of vertigo. Pt informed that if symptoms 
worsen, call the office for further instructions/medication interventions.

                                                             2017        
        

 

 Appointment: Klarissa Roberts 

WPtel:[phone]

 [address] [facility]

[place]                                                             (30 min) Complex
                    2017                

 

 Patient Education: Patient Medication Summary                                 
                            Completed                    2017            
    

 

 Patient Education: .Amazing charts Paroxysmal positional vertigo              
                                               Completed                    2017                

 

 Patient Education: Obesity                                                    
         Completed                    2017                

 

 Visit Plan:                     Right wrist pain - The pt is to use prn 
antiinflammatories to manage acute pain. The patient is to call the office if 
the pain is worsening or does not improve. Intermittent and varying joint pain 
- ongoing for the past 5-6 years - will check labs

                                                             2017        
        

 

 Appointment: Franca Casper 

WPtel:[phone]

 [facility]

[place]                                                             (10 min) Simple 
                   2017                

 

 Patient Education: Patient Medication Summary                                 
                            Completed                    2017            
    

 

 Visit Plan:                     Well Adult - pt was counseled about diet, 
exercise, and encouraged to follow a heart healthy diet and increase activity 
level. The patient was instructed to RTC yearly for well adult exams and PRN 
for acute illnesses. The pt was also instructed to have yearly labs for check 
of cholesterol, thyroid, chem panel, CBC, and renal functioning.

                                                             06/15/2016        
        

 

 Appointment: Meghan Landrum 

WPtel:[phone]

 [facility] [facility]

[place]                                                             (15 min) 
Moderate                    06/15/2016                

 

 Patient Education: Patient Medication Summary                                 
                            Completed                    06/15/2016            
    

 

 Patient Education: Obesity                                                    
         Completed                    06/15/2016                

 

 Visit Plan:                     Well Adult - pt was counseled about diet, 
exercise, and encouraged to follow a heart healthy diet and increase activity 
level. The patient was instructed to RTC yearly for well adult exams and PRN 
for acute illnesses. The pt was also instructed to have yearly labs for check 
of cholesterol, thyroid, chem panel, CBC, and renal functioning.

                                                             2015        
        

 

 Appointment: Meghan Landrum 

WPtel:[phone]

 [facility]

                                                             (15 min) 
Moderate                    2015                

 

 Patient Education: Patient Medication Summary                                 
                            Completed                    2015            
    

 

 Visit Plan:                     Achilles tendonitis - uncontrolled symptoms- 
recommend pt to take antiinflammatory as directed for pain control. Use tylenol 
for break through pain symptoms.

                                                             2013        
        

 

 Appointment: Meghan Landrum 

WPtel:[phone]

 [facility]

                                                             Follow up       
             2013                

 

 Patient Education: Patient Medication Summary                                 
                            Completed                    2013            
    

 

 Patient Education: Achilles Tendon Injury Exercises                           
                                  Completed                    2013      
          

 

 Patient Education: Achilles Tendon Injury Exercises: Illustration             
                                                Completed                                    

 

 Visit Plan:                     Rash-chronic--plan to treat with short course 
of prednisone and monitor symptoms. Patient is to call if rash does not resolve 
completely or if any worse. Finish anti-fungal medication as well. Patient 
verbalized understanding.

                                                             2013        
        

 

 Appointment: Klarissa Roberts 

WPtel:[phone]

 [address] [place], [place] [address] [place] [address]                                                             Other           
         2013                

 

 Patient Education: Patient Medication Summary                                 
                            Completed                    2013            
    

 

 Visit Plan:                     Rash-culture done today in the office-plan to 
treat with short course of prednisone and monitor symptoms. Patient is to call 
if rash does not resolve completely or if any worse. Finish anti-fungal 
medication as well. Patient verbalized understanding. URI - Pt advised to 
increase fluids, vitamin C. Discussed natural and expected course of this 
diagnosis and need to alert me if symtpoms do not follow expected course, or if 
any worse. RX sent to patient's pharmacy.Rocephin and kenalog injection today 
in the office.

                                                             2012        
        

 

 Appointment: Klarissa Roberts 

WPtel:[phone]

 [facility] [facility]

                                                             Other           
         2012                

 

 Patient Education: Patient Medication Summary                                 
                            Completed                    2012            
    

 

 Visit Plan:                     Rash - Itching - uncertain eitiology - but 
with his response to the steroid and antifungal and the diffuse look of a 
folliculitis - I will treat Jose with a different antifungal and steroid and 
follow his progress. itraconazole 100 mg x 20 days prednisone taper

                                                             2012        
        

 

 Appointment: Meghan Landrum 

WPtel:[phone]

 [address] [id number]

                                                             Other           
         2012                

 

 Patient Education: Patient Medication Summary                                 
                            Completed                    2012            
    

 

 Visit Plan:                     Rash -uncertain eitiology - unable to get 
sample for reliable culture- will empirically treat with prednisone taper and 
diflucan x 7 days. Pt is to call if the rash does not improve or if it worsens. 
Elevated blood pressure - check blood pressure at home, cut back on salty foods
, call in blood pressure in one week.

                                                             2012        
        

 

 Appointment: Meghan Landrum 

WPtel:[phone]

 [address] [id number]

                                                             Other           
         2012                

 

 Patient Education: Patient Medication Summary                                 
                            Completed                    2012            
    



                                                                               
                                                                               
                                                                               
                                                                               
                                                                        



Instructions

                      





 Comment                

 

                     . Rash -uncertain eitiology - unable to get sample for 
reliable culture- will empirically treat with prednisone taper and diflucan x 7 
days.

Pt is to call if the rash does not improve or if it worsens.



Elevated blood pressure - check blood pressure at home, cut back on salty foods
, call in blood pressure in one week.                                  

 

                     . RA - acute flare - will send RX - pt has an appointment 
with a new rheumatologist in January.  Pt is to notify clinic if symptoms do 
not improve, if they worsen, or with any acute changes, questions, or concerns.
                                   

 

                     . RA - acute flare - will send RX - pt has an appointment 
with a new rheumatologist in January.  Pt is to notify clinic if symptoms do 
not improve, if they worsen, or with any acute changes, questions, or concerns.
                                   

 

                     TREAT WITH PENNSAID 10 TO 15 DROPS FOUR TIMES DAILY X 2 
WEEKS.

 . Achilles tendonitis -  uncontrolled symptoms- recommend pt to take 
antiinflammatory as directed for pain control.



Use tylenol for break through pain symptoms.                                  

 

                     . Rash - Itching - uncertain eitiology - but with his 
response to the steroid and antifungal and the diffuse look of a folliculitis - 
I will treat Jose with a different antifungal and steroid and follow his 
progress.



itraconazole 100 mg x 20 days

prednisone taper                                  

 

                     . Rash-culture done today in the office-plan to treat with 
short course of prednisone and monitor symptoms. Patient is to call if rash 
does not resolve completely or if any worse. Finish anti-fungal medication as 
well. Patient verbalized understanding. 



 URI - Pt advised to increase fluids, vitamin C.  Discussed natural and 
expected course of this diagnosis and need to alert me if symtpoms do not 
follow expected course, or if any worse. RX sent to patient's pharmacy.Rocephin 
and kenalog injection today in the office.                                   

 

                     . BPPV - Benign Paroxysmal Positional Vertigo - discussed 
diagnosis with the patient, offered the pt the appropriate additional 
information in hand-out.  Pt instructed in home exercises to help alleviate and 
prevent future recurrent episodes of vertigo.  Pt informed that if symptoms 
worsen, call the office for further instructions/medication interventions.



RA - Pt is to follow with specialist at  - pt is to notify clinic of any 
changes in current treatment plan or with any acute questions or concerns. 



Weight loss - will check labs and treat as indicated.                          
        

 

                     . Well Adult - pt was counseled about diet, exercise, and 
encouraged to follow a heart healthy diet and increase activity level.  The 
patient was instructed to RTC yearly for well adult exams and PRN for acute 
illnesses.  The pt was also instructed to have yearly labs for check of 
cholesterol, thyroid, chem panel, CBC, and renal functioning.

                                  

 

                     . BPPV - Benign Paroxysmal Positional Vertigo - discussed 
diagnosis with the patient, offered the pt the appropriate additional 
information in hand-out.  Pt instructed in home exercises to help alleviate and 
prevent future recurrent episodes of vertigo.  Pt informed that if symptoms 
worsen, call the office for further instructions/medication interventions.

                                  

 

                     . Right wrist pain - The pt is to use prn 
antiinflammatories to manage acute pain. The patient is to call the office if 
the pain is worsening or does not improve. 



Intermittent and varying joint pain - ongoing for the past 5-6 years - will 
check labs                                  

 

                     steroid shot today 



prednisone 40mg daily x 2 more days, then 20mg daily x 2 days, then stop - let 
me know if your symptoms are not improving



Continue doxycycline for now - will check chest x-ray - if needed will change 
antibiotics. 



[facility] allergy medicine - I will send as a prescription, if it is cheaper over 
the counter get the over the counter (they are the same medicine and dose)



Let me know if you are not getting better.. Bronchitis - acute case of 
bronchitis identified.  Pt has been given antibiotics, steroid as appropriate, 
and pt has been instructed to call if symptoms are not improved, or if symptoms 
acutely worsen.



Allergies - chronic - recommended pt to use allergy medication as prescribed.  
Pt has been counseled as  to the appropriate use of the medication.  Pt to call 
if allergy symptoms are not controlled with the medication.

If using nasal spray, instructions as follows: Nasal spray- use twice daily, 
one spray per nostril twice daily, after 30 minutes, rinse out nose with saline 
spray.. Use opposite hand per nostril to spray in the nasal steroid allergy 
spray.

                                  

 

                     steroid shot today 



prednisone 40mg daily x 2 more days, then 20mg daily x 2 days, then stop - let 
me know if your symptoms are not improving



Continue doxycycline for now - will check chest x-ray - if needed will change 
antibiotics. 



[facility] allergy medicine - I will send as a prescription, if it is cheaper over 
the counter get the over the counter (they are the same medicine and dose)



Let me know if you are not getting better.. Bronchitis - acute case of 
bronchitis identified.  Pt has been given antibiotics, steroid as appropriate, 
and pt has been instructed to call if symptoms are not improved, or if symptoms 
acutely worsen.



Allergies - chronic - recommended pt to use allergy medication as prescribed.  
Pt has been counseled as  to the appropriate use of the medication.  Pt to call 
if allergy symptoms are not controlled with the medication.

If using nasal spray, instructions as follows: Nasal spray- use twice daily, 
one spray per nostril twice daily, after 30 minutes, rinse out nose with saline 
spray.. Use opposite hand per nostril to spray in the nasal steroid allergy 
spray.

                                  

 

                     . Rash-chronic--plan to treat with short course of 
prednisone and monitor symptoms. Patient is to call if rash does not resolve 
completely or if any worse. Finish anti-fungal medication as well. Patient 
verbalized understanding. 



                                  

 

                     retinol or cortisone on the dark skin lesion on back - 

the site on the back of your left calf is called a dermatofibroma is and benign.

 . Well Adult - pt was counseled about diet, exercise, and encouraged to follow 
a heart healthy diet and increase activity level.  The patient was instructed 
to RTC yearly for well adult exams and PRN for acute illnesses.  The pt was 
also instructed to have yearly labs for check of cholesterol, thyroid, chem 
panel, CBC, and renal functioning.

## 2018-03-05 NOTE — XMS REPORT
[facility]

 Created on: 2017



Robert  Jose

External Reference #: [id number]

: 1980

Sex: Male



Demographics







 Address  [address] [place]  [address]

 

 Preferred Language  Unknown

 

 Marital Status  Unknown

 

 Anab[employer]ism Affiliation  Unknown

 

 Race  Unknown

 

 Ethnic Group  Unknown





Author







 Author  GRACE SU

 

 [facility]

 

 Address  [address] [place]  [address]



 

 Phone  [phone]







Care Team Providers







 Care Team Member Name  Role  Phone

 

 GRACE SU  Unavailable  [phone]







PROBLEMS

Unknown Problems



ALLERGIES

No Known Allergies



SOCIAL HISTORY

Never Assessed



PLAN OF CARE





VITAL SIGNS







 Height  74 in  2017

 

 Weight  224.8 lbs  2017

 

 Temperature  98.4 degrees Fahrenheit  2017

 

 Heart Rate  76 bpm  2017

 

 Respiratory Rate  20   2017

 

 BMI  28.86 kg/m2  2017

 

 Blood pressure systolic  124 mmHg  2017

 

 Blood pressure diastolic  74 mmHg  2017







MEDICATIONS







 Medication  Instructions  Dosage  Frequency  Start Date  End Date  Duration  
Status

 

 Meloxicam                    Active

 

 PredniSONE 20 mg  Orally Once a day  2 tablets  24h  13 May, 2017  18 May, 
2017  05 days  Active

 

 Doxycycline Hyclate 100 mg  Orally every 12 hrs  1 capsule  12h  13 May, 2017  
23 May, 2017  10 days  Active







RESULTS

No Results



PROCEDURES

No Known procedures



IMMUNIZATIONS

No Known Immunizations



MEDICAL (GENERAL) HISTORY







 Type  Description  Date

 

 Medical History  RA   

 

 Surgical History  unbilical hernia repair

## 2018-03-05 NOTE — XMS REPORT
CCD document using C-CDA

 Created on: 2018



Jose Jones

External Reference #: [id number]

: 1980

Sex: Male



Demographics







 Address  [address]  [address]

 

 Home Phone  [phone]

 

 Preferred Language  English

 

 Marital Status  

 

 Adventist Affiliation  Unknown

 

 Race  Other Race

 

 Ethnic Group  Not  or 





Author







 Author  Meghan Landrum

 

 Organization  Meghan Landrum MD, [facility]

 

 Address  [address] [place]  [address]



 

 Phone  [phone]







Care Team Providers







 Care Team Member Name  Role  Phone

 

  PP  Unavailable

 

  CCM  Unavailable



                                            



Summary Purpose

          Interface Exchange                                                   
                 



Insurance Providers

                      





 Payer name                    Policy type / Coverage type                    
Covered party ID                    Effective Begin Date                    
Effective End Date                

 

 [employer]    
                [id number]                    2017                    
Unknown                



                                                                              



Family history

                      



Mother            





 Diagnosis                    Age At Onset                

 

 No Family Disease Entered                    N/A                



            



Sister            





 Diagnosis                    Age At Onset                

 

 No Family Disease Entered                    N/A                



            



Brother            





 Diagnosis                    Age At Onset                

 

 No Family Disease Entered                    N/A                



            



Daughter            





 Diagnosis                    Age At Onset                

 

 No Family Disease Entered                    N/A                



            



Brother            





 Diagnosis                    Age At Onset                

 

 No Family Disease Entered                    N/A                



            



Brother            





 Diagnosis                    Age At Onset                

 

 No Family Disease Entered                    N/A                



            



Brother            





 Diagnosis                    Age At Onset                

 

 No Family Disease Entered                    N/A                



            



Father            





 Diagnosis                    Age At Onset                

 

 No Family Disease Entered                    N/A                



                                                                               
                                                                     



Social History

                      





 Social History Element                    Codes                    Description
                    Effective Dates                

 

 Number of children                    Unknown                    1            
        2013                

 

 Marital status                    Unknown                              
          2012                

 

 Living arrangements                    Unknown                    House       
             2012                

 

 Employment                    Unknown                    Currently employed

CarePayment                    2012                



                                                                               
                                       



Allergies, Adverse Reactions, Alerts

          Allergies, Adverse Reactions, Alerts data not found                  
                                                  



Past Medical History

                      





 Illness                    Codes                    Condition Status          
          Onset Date                    Resolved Date                

 

                     Abnormal weight loss                                      
ICD-9: 783.21

ICD-10: R63.4                    Active                    2018          
          Unknown                

 

                     Gastro-esophageal reflux disease without esophagitis      
                                ICD-9: 530.81

ICD-10: K21.9                    Active                    2018          
          Unknown                

 

                     Other malaise                                      ICD-9: 
780.79

ICD-10: R53.81                    Active                    2018         
           Unknown                

 

                     Benign paroxysmal vertigo, bilateral                      
                ICD-9: 386.11

ICD-10: H81.13                    Active                    2017         
           Unknown                

 

                     Other fatigue                                      ICD-9: 
780.79

ICD-10: R53.83                    Active                    2018         
           Unknown                

 

                     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 
without organ or systems involvement                                      ICD-9
: 714.0

ICD-10: M05.732                    Active                    2017        
            Unknown                

 

                     Acute bronchitis due to other specified organisms         
                             ICD-9: 466.0

ICD-10: J20.8                    Active                    05/15/2017          
          Unknown                

 

                     Other allergic rhinitis                                   
   ICD-9: 477.8

ICD-10: J30.89                    Active                    05/15/2017         
           Unknown                

 

                     Pain in right wrist                                      
ICD-9: 719.43

ICD-10: M25.531                    Active                    2017        
            Unknown                

 

                     Encounter for general adult medical examination without 
abnormal findings                                      ICD-9: V70.0

ICD-10: Z00.00                    Active                    2015         
           Unknown                

 

                     PREVENTIVE PHYSICAL EXAM                                  
    ICD-9: V70.0                    Active                    2015       
             Unknown                

 

                     ACHILLES TENDINITIS                                      
ICD-9: 726.71                    Active                    2013          
          Unknown                

 

                     ACUTE URI                                      ICD-9: 
465.9                    Active                    2012                  
  Unknown                

 

                     Elevated blood pressure                                   
   ICD-9: 796.2                    Active                    2012        
            Unknown                

 

                     Localized pruritus                                      ICD
-9: 698.9                    Active                    2012              
      Unknown                

 

                     Rash                                      ICD-9: 782.1    
                Active                    2012                    Unknown
                



                                                                               
                                                                               
                                                                                



Problems

                      





 Condition                    Codes                    Effective Dates         
           Condition Status                

 

                     Abnormal weight loss                                      
ICD-9: 783.21

ICD-10: R63.4                    2018                    Active          
      

 

                     Gastro-esophageal reflux disease without esophagitis      
                                ICD-9: 530.81

ICD-10: K21.9                    2018                    Active          
      

 

                     Other malaise                                      ICD-9: 
780.79

ICD-10: R53.81                    2018                    Active         
       

 

                     Benign paroxysmal vertigo, bilateral                      
                ICD-9: 386.11

ICD-10: H81.13                    2017                    Active         
       

 

                     Other fatigue                                      ICD-9: 
780.79

ICD-10: R53.83                    2018                    Active         
       

 

                     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 
without organ or systems involvement                                      ICD-9
: 714.0

ICD-10: M05.732                    2017                    Active        
        

 

                     Acute bronchitis due to other specified organisms         
                             ICD-9: 466.0

ICD-10: J20.8                    05/15/2017                    Active          
      

 

                     Other allergic rhinitis                                   
   ICD-9: 477.8

ICD-10: J30.89                    05/15/2017                    Active         
       

 

                     Pain in right wrist                                      
ICD-9: 719.43

ICD-10: M25.531                    2017                    Active        
        

 

                     Encounter for general adult medical examination without 
abnormal findings                                      ICD-9: V70.0

ICD-10: Z00.00                    2015                    Active         
       

 

                     PREVENTIVE PHYSICAL EXAM                                  
    ICD-9: V70.0                    2015                    Active       
         

 

                     ACHILLES TENDINITIS                                      
ICD-9: 726.71                    2013                    Active          
      

 

                     ACUTE URI                                      ICD-9: 
465.9                    2012                    Active                

 

                     Elevated blood pressure                                   
   ICD-9: 796.2                    2012                    Active        
        

 

                     Localized pruritus                                      ICD
-9: 698.9                    2012                    Active              
  

 

                     Rash                                      ICD-9: 782.1    
                2012                    Active                



                                                                               
                                                                               
                                                                                



Medications

                      





 Medication                    Codes                    Instructions           
         Start Date                    Stop Date                    Status     
               Fill Instructions                

 

                     Prilosec OTC 20 mg tablet,delayed release                 
                     RxNorm: 149285                    1 Tablet(s) PO daily    
                2018                    No Stop Date                    
Active                                    

 

                     meclizine 25 mg tablet                                    
  RxNorm: 491037                    1 Tablet(s) PO TID as needed               
     2018                    Inactive      
                              

 

                     Zofran ODT 4 mg disintegrating tablet                     
                 RxNorm: 866985                    1 Tablet(s) PO TID as needed
                    2018                    
Inactive                                    

 

                     clobetasol 0.05 % topical cream                           
           RxNorm: 940155                    APPLY TO AFFECTED AREAS TOP as 
needed FOR ECZEMA                    2017                    No Stop Date
                    Active                                    

 

                     Voltaren 1 % topical gel                                  
    RxNorm: 209147                    1 Application TOP QID as needed          
          2017                    No Stop Date                    Active 
                                   

 

                     prednisone 10 mg tablet                                   
   RxNorm: 740110                    Tablet(s) PO UD                    2018                    Inactive                 
   6,5,4,3,2,1                

 

                     Kenalog 40 mg/mL suspension for injection                 
                     RxNorm: 8059521                    Milliliter(s) Inj      
              2017                    
Inactive                                    

 

                     Zyrtec 10 mg tablet                                      
RxNorm: 4889872                    1 Tablet(s) PO daily                    05/15
/2017                    2017                    Inactive                
                    

 

                     Kenalog 40 mg/mL suspension for injection                 
                     RxNorm: 7265010                    1 Milliliter(s) Inj    
                05/15/2017                    05/15/2017                    
Inactive                                    

 

                     prednisone 20 mg tablet                                   
   RxNorm: 366003                    1 Tablet(s) PO daily                    05/
15/2017                    2018                    Inactive              
                      

 

                     clobetasol 0.05 % topical cream                           
           RxNorm: 064066                    APPLY TO AFFECTED AREAS TOP as 
needed FOR ECZEMA                    2016  
                  Inactive                                    

 

                     prednisone 10 mg tablets in a dose pack                   
                   RxNorm: 292150                    Tablet(s) PO              
      2013                    Inactive     
                               

 

                     Diflucan 150 mg tablet                                    
  RxNorm: 429240                    1 Tablet(s) PO daily                    2013                    Inactive              
                      

 

                     nystatin-triamcinolone 100,000 unit/gram-0.1 % Ointment   
                                   RxNorm: 9214171                    1 
Application TOP BID                    2012
                    Inactive                                    

 

                     nystatin-triamcinolone 100,000 unit/gram-0.1 % Ointment   
                                   RxNorm: 9916892                    1 
Application TOP BID                    2012
                    Inactive                                    

 

                     Bactrim  mg-160 mg tablet                           
           RxNorm: 293227                    1 Tablet(s) PO BID                
    2012                    Inactive       
                             

 

                     prednisone 10 mg tablets in a dose pack                   
                   RxNorm: 057543                    Tablet(s) PO UD 6-5-4-3-2-
1                    2012                  
  Inactive                                    

 

                     itraconazole 100 mg Cap                                   
   RxNorm: 310324                    1 Capsule(s) PO                    2013                    Inactive                 
                   

 

                     Kenalog 40 mg/mL Susp for Injection                       
               RxNorm: 3040015                    Milliliter(s) Inj            
        2012                    Inactive   
                                 

 

                     Kenalog 40 mg/mL Susp for Injection                       
               RxNorm: 3539673                    2 Milliliter(s) Inj          
          2012                    Inactive 
                                   

 

                     meloxicam 15 mg tablet                                    
  RxNorm: 087387                    1 Tablet(s) PO daily                    No 
Start Date                                         Active                      
              

 

                     itraconazole 100 mg Cap                                   
   RxNorm: 308271                    1 Capsule(s) PO                    No 
Start Date                    2012                    Inactive           
                         

 

                     clobetasol 0.05 % topical cream                           
           RxNorm: 331200                    APPLY TO AFFECTED AREAS TOP as 
needed FOR ECZEMA                    No Start Date                    02/10/
2016                    Inactive                                    



                                                                               
                                                                               
                                                                               
                                                             



Medication Administered

                      





 Medication                    Codes                    Instructions           
         Start Date                    Status                

 

 Kenalog 40 mg/mL suspension for injection                    RxNorm: 1189324  
                  Milliliter                    2017                    
No longer Active                

 

 Kenalog 40 mg/mL suspension for injection                    RxNorm: 8213723  
                  1Milliliter                    05/15/2017                    
No longer Active                

 

 Kenalog 40 mg/mL Susp for Injection                    RxNorm: 1297317        
            2Milliliter                    2012                    No 
longer Active                



                                                                               
                   



Immunizations

          No Immunization data                                                 
         



Assessments

                      





 Condition                    Codes                    Effective Dates         
       

 

 Abnormal weight loss                    ICD-10: R63.4

ICD-9: 783.21                    2018                

 

 Other malaise                    ICD-10: R53.81

ICD-9: 780.79                    2018                

 

 Gastro-esophageal reflux disease without esophagitis                    ICD-10
: K21.9

ICD-9: 530.81                    2018                

 

 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 
systems involvement                    ICD-10: M05.732

ICD-9: 714.0                    2018                

 

 Other fatigue                    ICD-10: R53.83

ICD-9: 780.79                    2018                

 

 Benign paroxysmal vertigo, bilateral                    ICD-10: H81.13

ICD-9: 386.11                    2018                

 

 Acute bronchitis due to other specified organisms                    ICD-10: 
J20.8

ICD-9: 466.0                    05/15/2017                

 

 Other allergic rhinitis                    ICD-10: J30.89

ICD-9: 477.8                    05/15/2017                

 

 Pain in right wrist                    ICD-10: M25.531

ICD-9: 719.43                    2017                

 

 Encounter for general adult medical examination without abnormal findings     
               ICD-10: Z00.00

ICD-9: V70.0                    06/15/2016                

 

 PREVENTIVE PHYSICAL EXAM                    ICD-9: V70.0                                    

 

 ACHILLES TENDINITIS                    ICD-9: 726.71                    2013                

 

 PRURITIC DISORDER                    ICD-9: 698.9                    2013                

 

 Rash                    ICD-9: 782.1                    2013            
    

 

 ACUTE URI                    ICD-9: 465.9                    2012       
         

 

 Elevated blood pressure                    ICD-9: 796.2                                    



                                                                    



Reason For Visit

                      





 Reason For Visit                    Effective Dates                    Notes  
              

 

 nausea                    2018                                    

 

 dizziness                    2018                                    

 

 wrist pain                    2017                                    

 

 cough                    05/15/2017                                    

 

 dizziness                    2017                                    

 

 wrist pain                    2017                                    

 

 ankle pain                    06/15/2016                                    

 

 skin lesion                    2015                                    

 

 ankle pain                    2013                                    

 

 skin lesion                    2013                                    

 

 headache                    2012                                    

 

 rash                    2012                    started on patient's 
right hand and spread to the left.  he was taking diflucan and nystatin cream.  
Once that cleared, it started on both of his arms.                  

 

 flare up of rash                    2012                                
    



                                                                              



Results

                      





 Observation                    Observation Code                    Item       
             Item Code                    Result                    Date       
         

 

 Jordyn                    [id number]                    JORDYN (FLAVIO) SCREEN           
                             DETECTED                     2017           
     

 

 Jordyn                    110214                    JORDYN (IFA) TITER              
                          1:160                     2017                

 

 Jordyn                    [id number]                    JORDYN PATTERN                  
                      SPECKLED                     2017                

 

 Jordyn                    [id number]                                                 
                               2017                

 

 Cbc With Differential                    Ord2                    WBC          
                              3.84 K/ul                    2017          
      

 

 Cbc With Differential                    Ord2                    RBC          
                              5.27 M/ul                    2017          
      

 

 Cbc With Differential                    Ord2                    HGB          
                              15.6 g/dl                    2017          
      

 

 Cbc With Differential                    Ord2                    Neut%        
                                56.7 %                    2017           
     

 

 Cbc With Differential                    Ord2                    HCT          
                              45.1 %                    2017             
   

 

 Cbc With Differential                    Ord2                    Lymph%       
                                 31.0 %                    2017          
      

 

 Cbc With Differential                    Ord2                    MCV          
                              85.6 fl                    2017            
    

 

 Cbc With Differential                    Ord2                    MCH          
                              29.6 pg                    2017            
    

 

 Cbc With Differential                    Ord2                    Mono%        
                                10.4 %                    2017           
     

 

 Cbc With Differential                    Ord2                    Eos%         
                               1.6 %                    2017             
   

 

 Cbc With Differential                    Ord2                    MCHC         
                               34.6 pg                    2017           
     

 

 Cbc With Differential                    Ord2                    PLT          
                              218 K/ul                    2017           
     

 

 Cbc With Differential                    Ord2                    Baso%        
                                0.3 %                    2017            
    

 

 Cbc With Differential                    Ord2                    RDW          
                              14.9 %                    2017             
   

 

 Cbc With Differential                    Ord2                    Neut ABS#    
                                    2.18 K/ul                    2017    
            

 

 Cbc With Differential                    Ord2                    Lymph ABS#   
                                     1.19 K/ul                    2017   
             

 

 Cbc With Differential                    Ord2                    Mono ABS#    
                                    0.4 K/ul                    2017     
           

 

 Cbc With Differential                    Ord2                    Eos ABS#     
                                   0.1 K/ul                    2017      
          

 

 Cbc With Differential                    Ord2                    Baso ABS#    
                                    0.0 K/ul                    2017     
           

 

 C-Reactive Protein  Qnt                    Crqnt                    CRP       
                                 0.7 mg/dl                    2017       
         

 

 Sed Rate                    Ord21                    ESR                      
                  4 mm/hr                    2017                

 

 Ra Factor                    Fee032                    RA FACTOR              
                          21.4 IU/ml                    2017             
   

 

 Uric Acid                    Ord77                    Uric A                  
                      6.2 mg/dL                    2017                

 

 Lipid                    Ord30                    CHOL                        
                193 mg/dL                    2016                

 

 Lipid                    Ord30                    HDL                         
               38.0 mg/dl                    2016                

 

 Lipid                    Ord30                    TRIG                        
                187 mg/dL                    2016                

 

 Lipid                    Ord30                    LDL                         
               118 mg/dL                    2016                

 

 Lipid                    Ord30                    C/HDL                       
                 5.1 Ratio                    2016                

 

 Tsh                    Ord6                    hTSH II                        
                1.78 uIU/mL                    2016                

 

 Comp Metabolic                    Hie460                    NA                
                        135 mEq/L                    2016                

 

 Comp Metabolic                    Ttr105                    K                 
                       4.3 mEq/L                    2016                

 

 Comp Metabolic                    Nej198                    CL                
                        102 mEq/L                    2016                

 

 Comp Metabolic                    Xnq728                    CO2               
                         30.0 mEq/L                    2016              
  

 

 Comp Metabolic                    Ckb663                    ANION GAP         
                               7                     2016                

 

 Comp Metabolic                    Glf398                    GLUCOSE           
                             105 mg/dL                    2016           
     

 

 Comp Metabolic                    Ivr471                    Creat             
                           0.9 mg/dL                    2016             
   

 

 Comp Metabolic                    Dzs923                    eGFR              
                          102 ml/min/1.73m2                    2016      
          

 

 Comp Metabolic                    Dmd358                    BUN               
                         13 mg/dL                    2016                

 

 Comp Metabolic                    Gpv853                    B/C Ratio         
                               14.4 Ratio                    2016        
        

 

 Comp Metabolic                    Kfh202                    CALCIUM           
                             9.1 mg/dL                    2016           
     

 

 Comp Metabolic                    Phg650                    ALK PHOS          
                              53 U/L                    2016             
   

 

 Comp Metabolic                    Iel995                    AST(SGOT)         
                               18 U/L                    2016            
    

 

 Comp Metabolic                    Anx528                    ALT(SGPT)         
                               27 U/L                    2016            
    

 

 Comp Metabolic                    Usu460                    BILI T            
                            0.5 mg/dL                    2016            
    

 

 Comp Metabolic                    Hlj704                    ALBUMIN           
                             4.4 g/dL                    2016            
    

 

 Comp Metabolic                    Eoj580                    TPRO              
                          6.9 g/dL                    2016                

 

 Comp Metabolic                    Wvu400                    GLOB              
                          2.5 g/dL                    2016                

 

 Comp Metabolic                    Evj024                    A/G Ratio         
                               1.7 Ratio                    2016         
       

 

 Comp Metabolic                    Mqb434                    Osmo              
                          271 mOsmo                    2016              
  

 

 Cbc With Differential                    Ord2                    WBC          
                              4.48 K/ul                    2016          
      

 

 Cbc With Differential                    Ord2                    RBC          
                              5.41 M/ul                    2016          
      

 

 Cbc With Differential                    Ord2                    HGB          
                              15.7 g/dl                    2016          
      

 

 Cbc With Differential                    Ord2                    HCT          
                              46.7 %                    2016             
   

 

 Cbc With Differential                    Ord2                    Neut%        
                                48.9 %                    2016           
     

 

 Cbc With Differential                    Ord2                    MCV          
                              86.3 fl                    2016            
    

 

 Cbc With Differential                    Ord2                    Lymph%       
                                 36.6 %                    2016          
      

 

 Cbc With Differential                    Ord2                    MCH          
                              29.0 pg                    2016            
    

 

 Cbc With Differential                    Ord2                    Mono%        
                                11.4 %                    2016           
     

 

 Cbc With Differential                    Ord2                    MCHC         
                               33.6 pg                    2016           
     

 

 Cbc With Differential                    Ord2                    Eos%         
                               2.9 %                    2016             
   

 

 Cbc With Differential                    Ord2                    Baso%        
                                0.2 %                    2016            
    

 

 Cbc With Differential                    Ord2                    PLT          
                              215 K/ul                    2016           
     

 

 Cbc With Differential                    Ord2                    RDW          
                              15.0 %                    2016             
   

 

 Cbc With Differential                    Ord2                    Neut ABS#    
                                    2.19 K/ul                    2016    
            

 

 Cbc With Differential                    Ord2                    Lymph ABS#   
                                     1.64 K/ul                    2016   
             

 

 Cbc With Differential                    Ord2                    Mono ABS#    
                                    0.5 K/ul                    2016     
           

 

 Cbc With Differential                    Ord2                    Eos ABS#     
                                   0.1 K/ul                    2016      
          

 

 Cbc With Differential                    Ord2                    Baso ABS#    
                                    0.0 K/ul                    2016     
           

 

 Cbc With Differential                    Ord2                    New Analyzer 
Notice                                        Please note new ref ranges 
starting 2016 due to implemntation of new five part differential hematolgy 
analyzer.                     2016                



                                                                               
                                                                               
          



Review of Systems

                      





 System                    Result                    Effective Dates           
     

 

 Constitutional                    No recent illness                    2018                

 

 Constitutional                    anorexia                    2018      
          

 

 Constitutional                    No chills                    2018     
           

 

 Constitutional                    No diaphoresis                    2018
                

 

 Constitutional                    fatigue                    2018       
         

 

 Constitutional                    malaise                    2018       
         

 

 Constitutional                    weight loss                    2018   
             

 

 Eyes                    No eye erythema                    2018         
       

 

 Ears/Nose/Throat/Neck                    No nasal discharge                    
2018                

 

 Ears/Nose/Throat/Neck                    No nasal allergies                    
2018                

 

 Ears/Nose/Throat/Neck                    No dizziness                    2018                

 

 Cardiovascular                    No chest pain/pressure                    2018                

 

 Cardiovascular                    No dyspnea                    2018    
            

 

 Respiratory                    No cough                    2018         
       

 

 Respiratory                    No chest congestion                    2018                

 

 Gastrointestinal                    anorexia                    2018    
            

 

 Gastrointestinal                    No abdominal pain                    2018                

 

 Gastrointestinal                    No diarrhea                    2018 
               

 

 Gastrointestinal                    No constipation                    2018                

 

 Gastrointestinal                    No gastroesophageal reflux                
    2018                

 

 Gastrointestinal                    No hematochezia                    2018                

 

 Gastrointestinal                    No melena                    2018   
             

 

 Gastrointestinal                    nausea                    2018      
          

 

 Gastrointestinal                    vomiting                    2018    
            

 

 Musculoskeletal                    arthralgia(s)                    2018
                

 

 Dermatologic                    No rash                    2018         
       

 

 Neurologic                    No alteration of consciousness                  
  2018                

 

 Neurologic                    No mental status change                    2018                

 

 Constitutional                    No recent illness                    2018                

 

 Constitutional                    No diaphoresis                    2018
                

 

 Constitutional                    No chills                    2018     
           

 

 Constitutional                    fatigue                    2018       
         

 

 Constitutional                    No fever                    2018      
          

 

 Constitutional                    malaise                    2018       
         

 

 Constitutional                    weight loss                    2018   
             

 

 Eyes                    No eye erythema                    2018         
       

 

 Ears/Nose/Throat/Neck                    No nasal discharge                    
2018                

 

 Ears/Nose/Throat/Neck                    No nasal allergies                    
2018                

 

 Cardiovascular                    No chest pain/pressure                                    

 

 Cardiovascular                    No dyspnea                    2018    
            

 

 Respiratory                    No cough                    2018         
       

 

 Respiratory                    No chest congestion                    2018                

 

 Gastrointestinal                    No abdominal pain                    2018                

 

 Gastrointestinal                    No constipation                    2018                

 

 Gastrointestinal                    No diarrhea                    2018 
               

 

 Gastrointestinal                    No gastroesophageal reflux                
    2018                

 

 Gastrointestinal                    No hematochezia                    2018                

 

 Gastrointestinal                    No melena                    2018   
             

 

 Gastrointestinal                    nausea                    2018      
          

 

 Gastrointestinal                    No vomiting                    2018 
               

 

 Gastrointestinal                    anorexia                    2018    
            

 

 Musculoskeletal                    No joint complaint                    2018                

 

 Musculoskeletal                    arthralgia(s)                    2018
                

 

 Dermatologic                    No rash                    2018         
       

 

 Neurologic                    No alteration of consciousness                  
  2018                

 

 Neurologic                    No mental status change                    2018                

 

 Psychiatric                    anxiety                    2018          
      

 

 Psychiatric                    depression                    2018       
         

 

 Constitutional                    No night sweats                    2018                

 

 Constitutional                    No insomnia                    2018   
             

 

 Constitutional                    No weight gain                    2018
                

 

 Eyes                    No eye discharge                    2018        
        

 

 Eyes                    No eye erythema                    2018         
       

 

 Ears/Nose/Throat/Neck                    dizziness                    2018                

 

 Respiratory                    No productive sputum                    2018                

 

 Genitourinary/Nephrology                    No dysuria                                    

 

 Constitutional                    No recent illness                    2017                

 

 Constitutional                    No chills                    2017     
           

 

 Constitutional                    No diaphoresis                    2017
                

 

 Constitutional                    No fever                    2017      
          

 

 Eyes                    No eye erythema                    2017         
       

 

 Ears/Nose/Throat/Neck                    No nasal discharge                    
2017                

 

 Ears/Nose/Throat/Neck                    No nasal allergies                    
2017                

 

 Cardiovascular                    No chest pain/pressure                                    

 

 Cardiovascular                    No dyspnea                    2017    
            

 

 Respiratory                    No cough                    2017         
       

 

 Respiratory                    No chest congestion                    2017                

 

 Gastrointestinal                    No abdominal pain                    2017                

 

 Musculoskeletal                    joint complaint                    2017                

 

 Neurologic                    No alteration of consciousness                  
  2017                

 

 Neurologic                    No mental status change                    2017                

 

 Constitutional                    recent illness                    05/15/2017
                

 

 Constitutional                    No chills                    05/15/2017     
           

 

 Constitutional                    fatigue                    05/15/2017       
         

 

 Constitutional                    No fever                    05/15/2017      
          

 

 Constitutional                    malaise                    05/15/2017       
         

 

 Eyes                    No eye discharge                    05/15/2017        
        

 

 Eyes                    No eye erythema                    05/15/2017         
       

 

 Ears/Nose/Throat/Neck                    nasal allergies                    05/
15/2017                

 

 Ears/Nose/Throat/Neck                    No nasal discharge                    
05/15/2017                

 

 Cardiovascular                    No chest pain/pressure                    05/
15/2017                

 

 Respiratory                    cough                    05/15/2017            
    

 

 Gastrointestinal                    No abdominal pain                    05/15/
2017                

 

 Gastrointestinal                    No constipation                    05/15/
2017                

 

 Gastrointestinal                    No diarrhea                    05/15/2017 
               

 

 Dermatologic                    No rash                    05/15/2017         
       

 

 Neurologic                    No alteration of consciousness                  
  05/15/2017                

 

 Ears/Nose/Throat/Neck                    dizziness                    05/15/
2017                

 

 Respiratory                    chest congestion                    05/15/2017 
               

 

 Respiratory                    dyspnea on exertion                    05/15/
2017                

 

 Respiratory                    No dyspnea                    05/15/2017       
         

 

 Respiratory                    wheezing                    05/15/2017         
       

 

 Neurologic                    No mental status change                    05/15/
2017                

 

 Constitutional                    No recent illness                    2017                

 

 Constitutional                    No anorexia                    2017   
             

 

 Constitutional                    No night sweats                    2017                

 

 Constitutional                    No chills                    2017     
           

 

 Constitutional                    No diaphoresis                    2017
                

 

 Constitutional                    No fatigue                    2017    
            

 

 Constitutional                    No fever                    2017      
          

 

 Constitutional                    No insomnia                    2017   
             

 

 Constitutional                    No malaise                    2017    
            

 

 Constitutional                    No weight loss                    2017
                

 

 Constitutional                    No weight gain                    2017
                

 

 Eyes                    No eye discharge                    2017        
        

 

 Eyes                    No eye erythema                    2017         
       

 

 Ears/Nose/Throat/Neck                    dizziness                    2017                

 

 Ears/Nose/Throat/Neck                    No nasal discharge                    
2017                

 

 Ears/Nose/Throat/Neck                    nasal allergies                    2017                

 

 Cardiovascular                    No chest pain/pressure                    2017                

 

 Cardiovascular                    No dyspnea                    2017    
            

 

 Respiratory                    No productive sputum                    2017                

 

 Respiratory                    No cough                    2017         
       

 

 Gastrointestinal                    No abdominal pain                    2017                

 

 Gastrointestinal                    No constipation                    2017                

 

 Gastrointestinal                    No diarrhea                    2017 
               

 

 Genitourinary/Nephrology                    No dysuria                                    

 

 Musculoskeletal                    No joint complaint                    2017                

 

 Dermatologic                    No rash                    2017         
       

 

 Neurologic                    No alteration of consciousness                  
  2017                

 

 Constitutional                    No recent illness                    2017                

 

 Constitutional                    No chills                    2017     
           

 

 Constitutional                    No fever                    2017      
          

 

 Eyes                    No eye erythema                    2017         
       

 

 Ears/Nose/Throat/Neck                    No nasal discharge                    
2017                

 

 Cardiovascular                    No chest pain/pressure                                    

 

 Cardiovascular                    No dyspnea                    2017    
            

 

 Respiratory                    No cough                    2017         
       

 

 Respiratory                    No dyspnea                    2017       
         

 

 Musculoskeletal                    joint complaint                    2017                

 

 Neurologic                    No alteration of consciousness                  
  2017                

 

 Neurologic                    No mental status change                    2017                

 

 Constitutional                    No recent illness                    06/15/
2016                

 

 Constitutional                    No night sweats                    06/15/
2016                

 

 Constitutional                    No chills                    06/15/2016     
           

 

 Constitutional                    No fatigue                    06/15/2016    
            

 

 Constitutional                    No fever                    06/15/2016      
          

 

 Constitutional                    No insomnia                    06/15/2016   
             

 

 Constitutional                    No malaise                    06/15/2016    
            

 

 Eyes                    No blindness                    06/15/2016            
    

 

 Eyes                    No vision change                    06/15/2016        
        

 

 Ears/Nose/Throat/Neck                    No dental pain                    06/
15/2016                

 

 Ears/Nose/Throat/Neck                    No dizziness                    06/15/
2016                

 

 Ears/Nose/Throat/Neck                    No dysphagia                    06/15/
2016                

 

 Ears/Nose/Throat/Neck                    No headache                    06/15/
2016                

 

 Ears/Nose/Throat/Neck                    No hearing loss                    06/
15/2016                

 

 Ears/Nose/Throat/Neck                    No nasal allergies                    
06/15/2016                

 

 Ears/Nose/Throat/Neck                    No sore throat                    06/
15/2016                

 

 Ears/Nose/Throat/Neck                    No postnasal drip                    
06/15/2016                

 

 Ears/Nose/Throat/Neck                    No sinus congestion                  
  06/15/2016                

 

 Cardiovascular                    No chest pain/pressure                    06/
15/2016                

 

 Cardiovascular                    No dyspnea                    06/15/2016    
            

 

 Cardiovascular                    No edema                    06/15/2016      
          

 

 Cardiovascular                    No exercise intolerance                    06
/15/2016                

 

 Cardiovascular                    No fatigue                    06/15/2016    
            

 

 Cardiovascular                    No hypertension                    06/15/
2016                

 

 Cardiovascular                    No near-syncope/dizziness                    
06/15/2016                

 

 Cardiovascular                    No palpitations                    06/15/
2016                

 

 Respiratory                    No chest tightness                    06/15/
2016                

 

 Respiratory                    No cigarette smoking                    06/15/
2016                

 

 Respiratory                    No cough                    06/15/2016         
       

 

 Respiratory                    No dyspnea on exertion                    06/15/
2016                

 

 Respiratory                    No dyspnea                    06/15/2016       
         

 

 Respiratory                    No pedal edema                    06/15/2016   
             

 

 Gastrointestinal                    No abdominal pain                    06/15/
2016                

 

 Gastrointestinal                    No constipation                    06/15/
2016                

 

 Gastrointestinal                    No diarrhea                    06/15/2016 
               

 

 Gastrointestinal                    No gastroesophageal reflux                
    06/15/2016                

 

 Gastrointestinal                    No hematochezia                    06/15/
2016                

 

 Gastrointestinal                    No nausea                    06/15/2016   
             

 

 Gastrointestinal                    No vomiting                    06/15/2016 
               

 

 Genitourinary/Nephrology                    No dysuria                    06/15
/2016                

 

 Genitourinary/Nephrology                    No impotence                    06/
15/2016                

 

 Genitourinary/Nephrology                    No nocturia                    06/
15/2016                

 

 Genitourinary/Nephrology                    No urinary urgency                
    06/15/2016                

 

 Genitourinary/Nephrology                    No urinary frequency              
      06/15/2016                

 

 Genitourinary/Nephrology                    No urinary incontinence           
         06/15/2016                

 

 Musculoskeletal                    No stiffness                    06/15/2016 
               

 

 Musculoskeletal                    No swelling                    06/15/2016  
              

 

 Musculoskeletal                    No arthralgia(s)                    06/15/
2016                

 

 Musculoskeletal                    No joint complaint                    06/15/
2016                

 

 Musculoskeletal                    No muscle weakness                    06/15/
2016                

 

 Musculoskeletal                    No myalgias                    06/15/2016  
              

 

 Dermatologic                    mole change                    06/15/2016     
           

 

 Dermatologic                    No rash                    06/15/2016         
       

 

 Dermatologic                    No sores                    06/15/2016        
        

 

 Dermatologic                    No scar                    06/15/2016         
       

 

 Neurologic                    No ataxia                    06/15/2016         
       

 

 Neurologic                    No dizziness                    06/15/2016      
          

 

 Neurologic                    No dyskinesia or tremor                    06/15/
2016                

 

 Neurologic                    No gait abnormality                    06/15/
2016                

 

 Neurologic                    No headache                    06/15/2016       
         

 

 Neurologic                    No neck pain                    06/15/2016      
          

 

 Neurologic                    No syncope                    06/15/2016        
        

 

 Psychiatric                    No anxiety                    06/15/2016       
         

 

 Psychiatric                    No depression                    06/15/2016    
            

 

 Constitutional                    No recent illness                    2015                

 

 Constitutional                    No chills                    2015     
           

 

 Constitutional                    No fatigue                    2015    
            

 

 Constitutional                    No fever                    2015      
          

 

 Constitutional                    No insomnia                    2015   
             

 

 Constitutional                    No malaise                    2015    
            

 

 Eyes                    No blindness                    2015            
    

 

 Eyes                    No vision change                    2015        
        

 

 Ears/Nose/Throat/Neck                    No dental pain                                    

 

 Ears/Nose/Throat/Neck                    No dizziness                    2015                

 

 Ears/Nose/Throat/Neck                    No dysphagia                    2015                

 

 Ears/Nose/Throat/Neck                    No headache                    2015                

 

 Ears/Nose/Throat/Neck                    No hearing loss                                    

 

 Ears/Nose/Throat/Neck                    No nasal allergies                    
2015                

 

 Ears/Nose/Throat/Neck                    No sore throat                                    

 

 Ears/Nose/Throat/Neck                    No postnasal drip                    
2015                

 

 Ears/Nose/Throat/Neck                    No sinus congestion                  
  2015                

 

 Cardiovascular                    No chest pain/pressure                                    

 

 Cardiovascular                    No dyspnea                    2015    
            

 

 Cardiovascular                    No edema                    2015      
          

 

 Cardiovascular                    No exercise intolerance                                    

 

 Cardiovascular                    No fatigue                    2015    
            

 

 Cardiovascular                    No near-syncope/dizziness                    
2015                

 

 Respiratory                    No chest tightness                    2015                

 

 Respiratory                    No cough                    2015         
       

 

 Respiratory                    No dyspnea                    2015       
         

 

 Respiratory                    No pedal edema                    2015   
             

 

 Gastrointestinal                    No abdominal pain                    2015                

 

 Gastrointestinal                    No constipation                    2015                

 

 Gastrointestinal                    No diarrhea                    2015 
               

 

 Gastrointestinal                    No gastroesophageal reflux                
    2015                

 

 Gastrointestinal                    No nausea                    2015   
             

 

 Gastrointestinal                    No vomiting                    2015 
               

 

 Genitourinary/Nephrology                    No dysuria                                    

 

 Genitourinary/Nephrology                    No nocturia                                    

 

 Genitourinary/Nephrology                    No urinary incontinence           
         2015                

 

 Musculoskeletal                    No stiffness                    2015 
               

 

 Musculoskeletal                    No swelling                    2015  
              

 

 Musculoskeletal                    No muscle weakness                    2015                

 

 Musculoskeletal                    No myalgias                    2015  
              

 

 Dermatologic                    No rash                    2015         
       

 

 Dermatologic                    No sores                    2015        
        

 

 Dermatologic                    No scar                    2015         
       

 

 Neurologic                    No dizziness                    2015      
          

 

 Neurologic                    No headache                    2015       
         

 

 Neurologic                    No neck pain                    2015      
          

 

 Neurologic                    No syncope                    2015        
        

 

 Psychiatric                    No anxiety                    2015       
         

 

 Psychiatric                    No depression                    2015    
            

 

 Constitutional                    No night sweats                    2015                

 

 Cardiovascular                    No hypertension                    2015                

 

 Cardiovascular                    No palpitations                    2015                

 

 Respiratory                    No cigarette smoking                    2015                

 

 Respiratory                    No dyspnea on exertion                    2015                

 

 Gastrointestinal                    No hematochezia                    2015                

 

 Genitourinary/Nephrology                    No impotence                                    

 

 Genitourinary/Nephrology                    No urinary frequency              
      2015                

 

 Genitourinary/Nephrology                    No urinary urgency                
    2015                

 

 Musculoskeletal                    No arthralgia(s)                    2015                

 

 Musculoskeletal                    No joint complaint                    2015                

 

 Neurologic                    No ataxia                    2015         
       

 

 Neurologic                    No dyskinesia or tremor                    2015                

 

 Neurologic                    No gait abnormality                    2015                

 

 Dermatologic                    mole change                    2015     
           

 

 Constitutional                    No recent illness                    2013                

 

 Constitutional                    No chills                    2013     
           

 

 Constitutional                    No fatigue                    2013    
            

 

 Constitutional                    No fever                    2013      
          

 

 Constitutional                    No insomnia                    2013   
             

 

 Constitutional                    No malaise                    2013    
            

 

 Cardiovascular                    No chest pain/pressure                                    

 

 Cardiovascular                    No dyspnea                    2013    
            

 

 Cardiovascular                    No edema                    2013      
          

 

 Cardiovascular                    No exercise intolerance                                    

 

 Cardiovascular                    No fatigue                    2013    
            

 

 Cardiovascular                    No near-syncope/dizziness                    
2013                

 

 Respiratory                    No chest tightness                    2013                

 

 Respiratory                    No cigarette smoking                    2013                

 

 Respiratory                    No cough                    2013         
       

 

 Respiratory                    No dyspnea                    2013       
         

 

 Respiratory                    No pedal edema                    2013   
             

 

 Respiratory                    No snoring                    2013       
         

 

 Respiratory                    No wheezing                    2013      
          

 

 Psychiatric                    No anxiety                    2013       
         

 

 Psychiatric                    No depression                    2013    
            

 

 Dermatologic                    No rash                    2013         
       

 

 Dermatologic                    No scar                    2013         
       

 

 Gastrointestinal                    No hemorrhoids                    2013                

 

 Gastrointestinal                    No abdominal pain                    2013                

 

 Gastrointestinal                    No constipation                    2013                

 

 Gastrointestinal                    No diarrhea                    2013 
               

 

 Gastrointestinal                    No gastroesophageal reflux                
    2013                

 

 Gastrointestinal                    No melena                    2013   
             

 

 Gastrointestinal                    No nausea                    2013   
             

 

 Gastrointestinal                    No vomiting                    2013 
               

 

 Constitutional                    No chills                    2013     
           

 

 Constitutional                    No fatigue                    2013    
            

 

 Constitutional                    No fever                    2013      
          

 

 Eyes                    No vision change                    2013        
        

 

 Ears/Nose/Throat/Neck                    No dizziness                    2013                

 

 Ears/Nose/Throat/Neck                    No headache                    2013                

 

 Cardiovascular                    No chest pain/pressure                                    

 

 Respiratory                    No dyspnea                    2013       
         

 

 Gastrointestinal                    No constipation                    2013                

 

 Gastrointestinal                    No diarrhea                    2013 
               

 

 Gastrointestinal                    No nausea                    2013   
             

 

 Gastrointestinal                    No vomiting                    2013 
               

 

 Neurologic                    No dizziness                    2013      
          

 

 Psychiatric                    No anxiety                    2013       
         

 

 Psychiatric                    No depression                    2013    
            

 

 Constitutional                    recent illness                    2012
                

 

 Constitutional                    No anorexia                    2012   
             

 

 Constitutional                    No night sweats                    2012                

 

 Constitutional                    No chills                    2012     
           

 

 Constitutional                    No diaphoresis                    2012
                

 

 Constitutional                    No fatigue                    2012    
            

 

 Constitutional                    No fever                    2012      
          

 

 Constitutional                    No insomnia                    2012   
             

 

 Constitutional                    No malaise                    2012    
            

 

 Eyes                    No eye discharge                    2012        
        

 

 Eyes                    No eye erythema                    2012         
       

 

 Cardiovascular                    No chest pain/pressure                                    

 

 Respiratory                    cough                    2012            
    

 

 Respiratory                    chest congestion                    2012 
               

 

 Respiratory                    No dyspnea on exertion                    2012                

 

 Respiratory                    No dyspnea                    2012       
         

 

 Gastrointestinal                    No vomiting                    2012 
               

 

 Gastrointestinal                    No nausea                    2012   
             

 

 Gastrointestinal                    No constipation                    2012                

 

 Gastrointestinal                    No diarrhea                    2012 
               

 

 Gastrointestinal                    No abdominal pain                    2012                

 

 Genitourinary/Nephrology                    No dysuria                                    

 

 Musculoskeletal                    No joint complaint                    2012                

 

 Constitutional                    No chills                    2012     
           

 

 Constitutional                    No fatigue                    2012    
            

 

 Eyes                    No vision change                    2012        
        

 

 Respiratory                    No dyspnea                    2012       
         

 

 Constitutional                    No fever                    2012      
          

 

 Ears/Nose/Throat/Neck                    No headache                    2012                

 

 Ears/Nose/Throat/Neck                    No dizziness                    2012                

 

 Cardiovascular                    No chest pain/pressure                                    

 

 Gastrointestinal                    No constipation                    2012                

 

 Gastrointestinal                    No diarrhea                    2012 
               

 

 Gastrointestinal                    No vomiting                    2012 
               

 

 Gastrointestinal                    No nausea                    2012   
             

 

 Neurologic                    No dizziness                    2012      
          

 

 Psychiatric                    No anxiety                    2012       
         

 

 Psychiatric                    No depression                    2012    
            

 

 Constitutional                    No fever                    2012      
          

 

 Cardiovascular                    No chest pain/pressure                                    

 

 Ears/Nose/Throat/Neck                    No dizziness                    2012                

 

 Ears/Nose/Throat/Neck                    No headache                    2012                

 

 Gastrointestinal                    No constipation                    2012                

 

 Gastrointestinal                    No diarrhea                    2012 
               

 

 Gastrointestinal                    No nausea                    2012   
             

 

 Gastrointestinal                    No vomiting                    2012 
               

 

 Neurologic                    No dizziness                    2012      
          

 

 Psychiatric                    No anxiety                    2012       
         

 

 Psychiatric                    No depression                    2012    
            



                                                                    



Physical Exam

                      





 Exam Name                    System Name                    Item Name         
           Status                    Result                    Effective Dates 
                   Notes                

 

 Full Exam - General                     Constitutional                     
general appearance                    Overall:                    well 
developed                    2018                    None                

 

 Full Exam - General                     Constitutional                     
general appearance                    Overall:                    in no acute 
distress                    2018                    None                

 

 Full Exam - General                     Constitutional                     
general appearance                    Overall:                    well 
nourished                    2018                    None                

 

 Full Exam - General                     Eyes                    conjunctiva
/eyelids                    Overall:                    conjunctiva clear      
              2018                    None                

 

 Full Exam - General                     Eyes                    conjunctiva
/eyelids                    Overall:                    cornea clear           
         2018                    None                

 

 Full Exam - General                     Eyes                    conjunctiva
/eyelids                    Overall:                    eyelids normal         
           2018                    None                

 

 Full Exam - General 1995                    Ears/Nose/Throat                  
  lips/teeth/gingiva                    Overall:                    benign lips
                    2018                    None                

 

 Full Exam - General 1995                    Ears/Nose/Throat                  
  oral cavity/pharynx/larynx                     Overall:                    
oral mucosa clear                    2018                    None        
        

 

 Full Exam - General                     Respiratory                    
respiratory effort/rhythm                    Overall:                    normal 
rate                    2018                    None                

 

 Full Exam - General                     Respiratory                    
respiratory effort/rhythm                    Overall:                    no 
retractions                    2018                    None              
  

 

 Full Exam - General                     Respiratory                    
auscultation                    Overall:                    breath sounds clear 
bilaterally                    2018                    None              
  

 

 Full Exam - General                     Cardiovascular                    
auscultation of heart                    Overall:                    regular 
rate                    2018                    None                

 

 Full Exam - General                     Cardiovascular                    
auscultation of heart                    Overall:                    normal 
heart sounds                    2018                    None             
   

 

 Full Exam - General                     Abdomen                    
abdominal exam                    Overall:                    normal bowel 
sounds                    2018                    None                

 

 Full Exam - General                     Abdomen                    
abdominal exam                    Overall:                    no tenderness    
                2018                    None                

 

 Full Exam - General                     Musculoskeletal                    
gait and station                    Overall:                    normal gait    
                2018                    None                

 

 Full Exam - General                     Musculoskeletal                    
gait and station                    Overall:                    normal station 
                   2018                    None                

 

 Full Exam - General                     Musculoskeletal                    
head and neck                    Overall:                    head atraumatic   
                 2018                    None                

 

 Full Exam - General                     Neurologic                    
cranial nerves                    Overall:                    crainial nerves 2 
- 12 grossly intact                    2018                    None      
          

 

 Full Exam - General                     Psychiatric                    
orientation/consciousness                    Overall:                    
oriented to person, place and time                    2018               
     None                

 

 Full Exam - General                     Psychiatric                    
mood and affect                    Overall:                    normal mood and 
affect                    2018                    None                

 

 Full Exam - General                     Psychiatric                    
appearance                    Overall:                    well-groomed, good 
eye contact                    2018                    None              
  

 

 Full Exam - General                     Constitutional                     
general appearance                    Hygiene/Attention to Grooming:           
         good hygiene                    2018                    None    
            

 

 Full Exam - General                     Eyes                    conjunctiva
/eyelids                    Overall:                    conjunctiva clear      
              2018                    None                

 

 Full Exam - General                     Eyes                    conjunctiva
/eyelids                    Overall:                    cornea clear           
         2018                    None                

 

 Full Exam - General                     Eyes                    conjunctiva
/eyelids                    Overall:                    eyelids normal         
           2018                    None                

 

 Full Exam - General                     Eyes                    pupils and 
irises                    Overall:                    pupils equal, round, 
reactive to light and accomodation                    2018               
     None                

 

 Full Exam - General                     Ears/Nose/Throat                  
  otoscopic exam                    Overall:                    external 
auditory canals clear                    2018                    None    
            

 

 Full Exam - General                     Ears/Nose/Throat                  
  otoscopic exam                    Overall:                    tympanic 
membranes clear                    2018                    None          
      

 

 Full Exam - General                     Ears/Nose/Throat                  
  hearing assessment                    Overall:                    hearing 
intact bilaterally                    2018                    None       
         

 

 Full Exam - General                     Ears/Nose/Throat                  
  lips/teeth/gingiva                    Overall:                    benign lips
                    2018                    None                

 

 Full Exam - General                     Ears/Nose/Throat                  
  lips/teeth/gingiva                    Overall:                    normal 
dentition                    2018                    None                

 

 Full Exam - General                     Ears/Nose/Throat                  
  lips/teeth/gingiva                    Overall:                    benign 
gingiva                    2018                    None                

 

 Full Exam - General                     Ears/Nose/Throat                  
  oral cavity/pharynx/larynx                     Overall:                    
oral mucosa clear                    2018                    None        
        

 

 Full Exam - General                     Ears/Nose/Throat                  
  oral cavity/pharynx/larynx                     Overall:                    
oropharyngeal mucosa clear                    2018                    
None                

 

 Full Exam - General                     Respiratory                    
auscultation                    Overall:                    breath sounds clear 
bilaterally                    2018                    None              
  

 

 Full Exam - General                     Respiratory                    
respiratory effort/rhythm                    Overall:                    no 
retractions                    2018                    None              
  

 

 Full Exam - General                     Respiratory                    
respiratory effort/rhythm                    Overall:                    normal 
rate                    2018                    None                

 

 Full Exam - General                     Cardiovascular                    
extremities                    Overall:                    no clubbing         
           2018                    None                

 

 Full Exam - General                     Cardiovascular                    
auscultation of heart                    Overall:                    regular 
rate                    2018                    None                

 

 Full Exam - General                     Cardiovascular                    
auscultation of heart                    Overall:                    normal 
heart sounds                    2018                    None             
   

 

 Full Exam - General                     Abdomen                    
abdominal exam                    Overall:                    no tenderness    
                2018                    None                

 

 Full Exam - General                     Abdomen                    
abdominal exam                    Overall:                    normal bowel 
sounds                    2018                    None                

 

 Full Exam - General                     Lymphatic                    neck 
nodes                    Overall:                    anterior cervical chain 
benign                    2018                    None                

 

 Full Exam - General                     Lymphatic                    neck 
nodes                    Overall:                    posterior cervical chain 
benign                    2018                    None                

 

 Full Exam - General                     Musculoskeletal                    
spine, ribs and pelvis                    Overall:                    good 
posture                    2018                    None                

 

 Full Exam - General                     Musculoskeletal                    
gait and station                    Overall:                    normal gait    
                2018                    None                

 

 Full Exam - General                     Musculoskeletal                    
gait and station                    Overall:                    normal station 
                   2018                    None                

 

 Full Exam - General                     Musculoskeletal                    
head and neck                    Overall:                    head atraumatic   
                 2018                    None                

 

 Full Exam - General                     Neurologic                    
cranial nerves                    Overall:                    crainial nerves 2 
- 12 grossly intact                    2018                    None      
          

 

 Full Exam - General                     Psychiatric                    
orientation/consciousness                    Overall:                    
oriented to person, place and time                    2018               
     None                

 

 Full Exam - General                     Constitutional                     
general appearance                    Overall:                    well 
developed                    2018                    None                

 

 Full Exam - General                     Constitutional                     
general appearance                    Overall:                    well 
nourished                    2018                    None                

 

 Full Exam - General                     Constitutional                     
general appearance                    Evidence of Distress:                    
mild distress                    2018                    None            
    

 

 Full Exam - General                     Constitutional                     
general appearance                    Evidence of Distress:                    
tearful                    2018                    None                

 

 Full Exam - General                     Ears/Nose/Throat                  
  otoscopic exam                    Tympanic membrane:                    air-
fluid level                    2018                    None              
  

 

 Full Exam - General                     Psychiatric                    
mood and affect                    Mood:                    flat               
     2018                    None                

 

 Full Exam - General                     Psychiatric                    
mood and affect                    Mood:                    depressed          
          2018                    None                

 

 Full Exam - General                     Psychiatric                    
mood and affect                    Mood:                    anxious            
        2018                    None                

 

 Full Exam - General                     Psychiatric                    
mood and affect                    Affect:                    flat             
       2018                    None                

 

 Full Exam - Orthopedics                    Constitutional                     
general appearance                    Overall:                    well 
nourished                    2017                    None                

 

 Full Exam - Orthopedics                    Constitutional                     
general appearance                    Overall:                    well 
developed                    2017                    None                

 

 Full Exam - Orthopedics                    Constitutional                     
general appearance                    Overall:                    in no acute 
distress                    2017                    None                

 

 Full Exam - Orthopedics                    Eyes                    conjunctiva/
eyelids                    Overall:                    conjunctiva clear       
             2017                    None                

 

 Full Exam - Orthopedics                    Eyes                    conjunctiva/
eyelids                    Overall:                    eyelids normal          
          2017                    None                

 

 Full Exam - Orthopedics                    Eyes                    conjunctiva/
eyelids                    Overall:                    cornea clear            
        2017                    None                

 

 Full Exam - Orthopedics                    Ears/Nose/Throat                    
oral cavity/pharynx/larynx                     Overall:                    oral 
mucosa clear                    2017                    None             
   

 

 Full Exam - Orthopedics                    Ears/Nose/Throat                    
lips/teeth/gingiva                    Overall:                    benign lips  
                  2017                    None                

 

 Full Exam - Orthopedics                    Respiratory                    
respiratory effort/rhythm                    Overall:                    no 
retractions                    2017                    None              
  

 

 Full Exam - Orthopedics                    Respiratory                    
respiratory effort/rhythm                    Overall:                    normal 
rate                    2017                    None                

 

 Full Exam - Orthopedics                    Respiratory                    
auscultation                    Overall:                    breath sounds clear 
bilaterally                    2017                    None              
  

 

 Full Exam - Orthopedics                    MS: left upper extremity           
         insp & palp - LUE                    Wrist:                    
swelling                    2017                    None                

 

 Full Exam - Orthopedics                    MS: left upper extremity           
         insp & palp - LUE                    Wrist:                    tender 
                   2017                    None                

 

 Full Exam - Orthopedics                    MS: left upper extremity           
         range of motion - LUE                    Wrist:                    
pain with extension                    2017                    None      
          

 

 Full Exam - Orthopedics                    MS: left upper extremity           
         range of motion - LUE                    Wrist:                    
pain with flexion                    2017                    None        
        

 

 Full Exam - Orthopedics                    Psychiatric                    
orientation/consciousness                    Overall:                    
oriented to person, place and time                    2017               
     None                

 

 Full Exam - Orthopedics                    Psychiatric                    mood 
and affect                    Overall:                    normal mood and 
affect                    2017                    None                

 

 Full Exam - Orthopedics                    Psychiatric                    
appearance                    Overall:                    well-groomed, good 
eye contact                    2017                    None              
  

 

 Full Exam - General                     Constitutional                     
general appearance                    Hygiene/Attention to Grooming:           
         good hygiene                    05/15/2017                    None    
            

 

 Full Exam - General                     Eyes                    conjunctiva
/eyelids                    Overall:                    conjunctiva clear      
              05/15/2017                    None                

 

 Full Exam - General                     Eyes                    conjunctiva
/eyelids                    Overall:                    eyelids normal         
           05/15/2017                    None                

 

 Full Exam - General                     Ears/Nose/Throat                  
  otoscopic exam                    Overall:                    external 
auditory canals clear                    05/15/2017                    None    
            

 

 Full Exam - General                     Ears/Nose/Throat                  
  otoscopic exam                    Overall:                    tympanic 
membranes clear                    05/15/2017                    None          
      

 

 Full Exam - General                     Ears/Nose/Throat                  
  lips/teeth/gingiva                    Overall:                    benign lips
                    05/15/2017                    None                

 

 Full Exam - General                     Ears/Nose/Throat                  
  oral cavity/pharynx/larynx                     Overall:                    
oral mucosa clear                    05/15/2017                    None        
        

 

 Full Exam - General                     Ears/Nose/Throat                  
  oral cavity/pharynx/larynx                     Overall:                    
oropharyngeal mucosa clear                    05/15/2017                    
None                

 

 Full Exam - General                     Respiratory                    
respiratory effort/rhythm                    Overall:                    no 
retractions                    05/15/2017                    None              
  

 

 Full Exam - General                     Respiratory                    
respiratory effort/rhythm                    Overall:                    normal 
rate                    05/15/2017                    None                

 

 Full Exam - General                     Cardiovascular                    
auscultation of heart                    Overall:                    regular 
rate                    05/15/2017                    None                

 

 Full Exam - General                     Cardiovascular                    
auscultation of heart                    Overall:                    normal 
heart sounds                    05/15/2017                    None             
   

 

 Full Exam - General                     Musculoskeletal                    
spine, ribs and pelvis                    Overall:                    good 
posture                    05/15/2017                    None                

 

 Full Exam - General                     Musculoskeletal                    
gait and station                    Overall:                    normal gait    
                05/15/2017                    None                

 

 Full Exam - General                     Musculoskeletal                    
gait and station                    Overall:                    normal station 
                   05/15/2017                    None                

 

 Full Exam - General                     Musculoskeletal                    
head and neck                    Overall:                    head atraumatic   
                 05/15/2017                    None                

 

 Full Exam - General                     Neurologic                    
cranial nerves                    Overall:                    crainial nerves 2 
- 12 grossly intact                    05/15/2017                    None      
          

 

 Full Exam - General                     Psychiatric                    
orientation/consciousness                    Overall:                    
oriented to person, place and time                    05/15/2017               
     None                

 

 Full Exam - General                     Psychiatric                    
mood and affect                    Overall:                    normal mood and 
affect                    05/15/2017                    None                

 

 Full Exam - General                     Constitutional                     
general appearance                    Overall:                    well 
developed                    05/15/2017                    None                

 

 Full Exam - General                     Constitutional                     
general appearance                    Overall:                    in no acute 
distress                    05/15/2017                    None                

 

 Full Exam - General                     Constitutional                     
general appearance                    Overall:                    well 
nourished                    05/15/2017                    None                

 

 Full Exam - General                     Respiratory                    
auscultation                    Lower lung field:                    expiratory 
wheezes                    05/15/2017                    None                

 

 Full Exam - General                     Lymphatic                    neck 
nodes                    Overall:                    posterior cervical chain 
benign                    05/15/2017                    None                

 

 Full Exam - General                     Lymphatic                    neck 
nodes                    Overall:                    anterior cervical chain 
benign                    05/15/2017                    None                

 

 Full Exam - General                     Constitutional                     
general appearance                    Development:                    well 
developed                    2017                    None                

 

 Full Exam - General                     Constitutional                     
general appearance                    Development:                    appears 
stated age                    2017                    None                

 

 Full Exam - General                     Constitutional                     
general appearance                    Hygiene/Attention to Grooming:           
         good hygiene                    2017                    None    
            

 

 Full Exam - General                     Eyes                    conjunctiva
/eyelids                    Overall:                    conjunctiva clear      
              2017                    None                

 

 Full Exam - General                     Eyes                    conjunctiva
/eyelids                    Overall:                    cornea clear           
         2017                    None                

 

 Full Exam - General                     Eyes                    conjunctiva
/eyelids                    Overall:                    eyelids normal         
           2017                    None                

 

 Full Exam - General                     Eyes                    pupils and 
irises                    Overall:                    pupils equal, round, 
reactive to light and accomodation                    2017               
     None                

 

 Full Exam - General                     Ears/Nose/Throat                  
  external ear                    Overall:                    normal appearance
                    2017                    None                

 

 Full Exam - General                     Ears/Nose/Throat                  
  external nose                    Overall:                    benign 
appearance                    2017                    None                

 

 Full Exam - General                     Ears/Nose/Throat                  
  otoscopic exam                    Overall:                    external 
auditory canals clear                    2017                    None    
            

 

 Full Exam - General                     Ears/Nose/Throat                  
  otoscopic exam                    Overall:                    tympanic 
membranes clear                    2017                    None          
      

 

 Full Exam - General                     Ears/Nose/Throat                  
  hearing assessment                    Overall:                    hearing 
intact bilaterally                    2017                    None       
         

 

 Full Exam - General                     Ears/Nose/Throat                  
  lips/teeth/gingiva                    Overall:                    benign lips
                    2017                    None                

 

 Full Exam - General                     Ears/Nose/Throat                  
  lips/teeth/gingiva                    Overall:                    normal 
dentition                    2017                    None                

 

 Full Exam - General                     Ears/Nose/Throat                  
  lips/teeth/gingiva                    Overall:                    benign 
gingiva                    2017                    None                

 

 Full Exam - General                     Ears/Nose/Throat                  
  oral cavity/pharynx/larynx                     Overall:                    
oral mucosa clear                    2017                    None        
        

 

 Full Exam - General                     Ears/Nose/Throat                  
  oral cavity/pharynx/larynx                     Overall:                    
oropharyngeal mucosa clear                    2017                    
None                

 

 Full Exam - General                     Ears/Nose/Throat                  
  oral cavity/pharynx/larynx                     Overall:                    
hypopharynx benign                    2017                    None       
         

 

 Full Exam - General                     Ears/Nose/Throat                  
  oral cavity/pharynx/larynx                     Overall:                    no 
masses                    2017                    None                

 

 Full Exam - General                     Neck                    thyroid   
                 Overall:                    nontender                    2017                    None                

 

 Full Exam - General                     Neck                    thyroid   
                 Overall:                    no mass lesions                    
2017                    None                

 

 Full Exam - General                     Respiratory                    
auscultation                    Overall:                    breath sounds clear 
bilaterally                    2017                    None              
  

 

 Full Exam - General                     Respiratory                    
respiratory effort/rhythm                    Overall:                    no 
retractions                    2017                    None              
  

 

 Full Exam - General                     Respiratory                    
respiratory effort/rhythm                    Overall:                    normal 
rate                    2017                    None                

 

 Full Exam - General                     Cardiovascular                    
inspection of carotid pulses                    Overall:                    
strong, bilaterally equal, no bruits                    2017             
       None                

 

 Full Exam - General                     Cardiovascular                    
extremities                    Overall:                    no clubbing         
           2017                    None                

 

 Full Exam - General                     Cardiovascular                    
auscultation of heart                    Overall:                    regular 
rate                    2017                    None                

 

 Full Exam - General                     Cardiovascular                    
auscultation of heart                    Overall:                    normal 
heart sounds                    2017                    None             
   

 

 Full Exam - General                     Cardiovascular                    
auscultation of heart                    Overall:                    no murmurs
                    2017                    None                

 

 Full Exam - General                     Abdomen                    
abdominal exam                    Overall:                    no tenderness    
                2017                    None                

 

 Full Exam - General                     Abdomen                    
abdominal exam                    Overall:                    normal bowel 
sounds                    2017                    None                

 

 Full Exam - General                     Abdomen                    liver 
and spleen exam                    Overall:                    no 
hepatosplenomegaly                    2017                    None       
         

 

 Full Exam - General                     Lymphatic                    neck 
nodes                    Overall:                    anterior cervical chain 
benign                    2017                    None                

 

 Full Exam - General                     Lymphatic                    neck 
nodes                    Overall:                    posterior cervical chain 
benign                    2017                    None                

 

 Full Exam - General                     Musculoskeletal                    
digits and nails                    Digits:                    a normal exam   
                 2017                    None                

 

 Full Exam - General                     Musculoskeletal                    
spine, ribs and pelvis                    Overall:                    spine 
benign                    2017                    None                

 

 Full Exam - General                     Musculoskeletal                    
spine, ribs and pelvis                    Overall:                    
sacroiliac joint benign                    2017                    None  
              

 

 Full Exam - General                     Musculoskeletal                    
spine, ribs and pelvis                    Overall:                    good 
posture                    2017                    None                

 

 Full Exam - General                     Musculoskeletal                    
gait and station                    Overall:                    normal gait    
                2017                    None                

 

 Full Exam - General                     Musculoskeletal                    
gait and station                    Overall:                    normal station 
                   2017                    None                

 

 Full Exam - General                     Musculoskeletal                    
head and neck                    Overall:                    head atraumatic   
                 2017                    None                

 

 Full Exam - General                     Musculoskeletal                    
head and neck                    Overall:                    cervical spine 
benign                    2017                    None                

 

 Full Exam - General                     Neurologic                    deep 
tendon reflexes                    Overall:                    deep tendon 
reflexes intact                    2017                    None          
      

 

 Full Exam - General                     Neurologic                    
cranial nerves                    Overall:                    crainial nerves 2 
- 12 grossly intact                    2017                    None      
          

 

 Full Exam - General                     Psychiatric                    
orientation/consciousness                    Overall:                    
oriented to person, place and time                    2017               
     None                

 

 Full Exam - General                     Psychiatric                    
mood and affect                    Overall:                    normal mood and 
affect                    2017                    None                

 

 Full Exam - Orthopedics                    Constitutional                     
general appearance                    Overall:                    well 
nourished                    2017                    None                

 

 Full Exam - Orthopedics                    Constitutional                     
general appearance                    Overall:                    well 
developed                    2017                    None                

 

 Full Exam - Orthopedics                    Constitutional                     
general appearance                    Overall:                    in no acute 
distress                    2017                    None                

 

 Full Exam - Orthopedics                    Eyes                    conjunctiva/
eyelids                    Overall:                    conjunctiva clear       
             2017                    None                

 

 Full Exam - Orthopedics                    Eyes                    conjunctiva/
eyelids                    Overall:                    eyelids normal          
          2017                    None                

 

 Full Exam - Orthopedics                    Ears/Nose/Throat                    
lips/teeth/gingiva                    Overall:                    benign lips  
                  2017                    None                

 

 Full Exam - Orthopedics                    Ears/Nose/Throat                    
oral cavity/pharynx/larynx                     Overall:                    oral 
mucosa clear                    2017                    None             
   

 

 Full Exam - Orthopedics                    Respiratory                    
respiratory effort/rhythm                    Overall:                    no 
retractions                    2017                    None              
  

 

 Full Exam - Orthopedics                    Respiratory                    
respiratory effort/rhythm                    Overall:                    normal 
rate                    2017                    None                

 

 Full Exam - Orthopedics                    Psychiatric                    
orientation/consciousness                    Overall:                    
oriented to person, place and time                    2017               
     None                

 

 Full Exam - Orthopedics                    Psychiatric                    mood 
and affect                    Overall:                    normal mood and 
affect                    2017                    None                

 

 Full Exam - Orthopedics                    Psychiatric                    
appearance                    Overall:                    well-groomed, good 
eye contact                    2017                    None              
  

 

 Full Exam - Orthopedics                    MS: right upper extremity          
          insp & palp - RUE                    Wrist:                    
redness                    2017                    None                

 

 Full Exam - Orthopedics                    MS: right upper extremity          
          insp & palp - RUE                    Wrist:                    joint 
swelling                    2017                    None                

 

 Full Exam - Orthopedics                    MS: right upper extremity          
          range of motion - RUE                    Wrist:                    
pain with extension                    2017                    None      
          

 

 Full Exam - General                     Constitutional                     
general appearance                    Development:                    well 
developed                    06/15/2016                    None                

 

 Full Exam - General                     Constitutional                     
general appearance                    Development:                    appears 
stated age                    06/15/2016                    None                

 

 Full Exam - General                     Constitutional                     
general appearance                    Hygiene/Attention to Grooming:           
         good hygiene                    06/15/2016                    None    
            

 

 Full Exam - General                     Eyes                    conjunctiva
/eyelids                    Overall:                    conjunctiva clear      
              06/15/2016                    None                

 

 Full Exam - General                     Eyes                    conjunctiva
/eyelids                    Overall:                    cornea clear           
         06/15/2016                    None                

 

 Full Exam - General                     Eyes                    conjunctiva
/eyelids                    Overall:                    eyelids normal         
           06/15/2016                    None                

 

 Full Exam - General                     Eyes                    pupils and 
irises                    Overall:                    pupils equal, round, 
reactive to light and accomodation                    06/15/2016               
     None                

 

 Full Exam - General                     Ears/Nose/Throat                  
  external ear                    Overall:                    normal appearance
                    06/15/2016                    None                

 

 Full Exam - General                     Ears/Nose/Throat                  
  external nose                    Overall:                    benign 
appearance                    06/15/2016                    None                

 

 Full Exam - General                     Ears/Nose/Throat                  
  otoscopic exam                    Overall:                    external 
auditory canals clear                    06/15/2016                    None    
            

 

 Full Exam - General                     Ears/Nose/Throat                  
  otoscopic exam                    Overall:                    tympanic 
membranes clear                    06/15/2016                    None          
      

 

 Full Exam - General                     Ears/Nose/Throat                  
  hearing assessment                    Overall:                    hearing 
intact bilaterally                    06/15/2016                    None       
         

 

 Full Exam - General                     Ears/Nose/Throat                  
  lips/teeth/gingiva                    Overall:                    benign lips
                    06/15/2016                    None                

 

 Full Exam - General                     Ears/Nose/Throat                  
  lips/teeth/gingiva                    Overall:                    normal 
dentition                    06/15/2016                    None                

 

 Full Exam - General                     Ears/Nose/Throat                  
  lips/teeth/gingiva                    Overall:                    benign 
gingiva                    06/15/2016                    None                

 

 Full Exam - General                     Ears/Nose/Throat                  
  oral cavity/pharynx/larynx                     Overall:                    
oral mucosa clear                    06/15/2016                    None        
        

 

 Full Exam - General                     Ears/Nose/Throat                  
  oral cavity/pharynx/larynx                     Overall:                    
oropharyngeal mucosa clear                    06/15/2016                    
None                

 

 Full Exam - General                     Ears/Nose/Throat                  
  oral cavity/pharynx/larynx                     Overall:                    
hypopharynx benign                    06/15/2016                    None       
         

 

 Full Exam - General                     Ears/Nose/Throat                  
  oral cavity/pharynx/larynx                     Overall:                    no 
masses                    06/15/2016                    None                

 

 Full Exam - General                     Neck                    thyroid   
                 Overall:                    nontender                    06/15/
2016                    None                

 

 Full Exam - General                     Neck                    thyroid   
                 Overall:                    no mass lesions                    
06/15/2016                    None                

 

 Full Exam - General                     Respiratory                    
auscultation                    Overall:                    breath sounds clear 
bilaterally                    06/15/2016                    None              
  

 

 Full Exam - General                     Respiratory                    
respiratory effort/rhythm                    Overall:                    no 
retractions                    06/15/2016                    None              
  

 

 Full Exam - General                     Respiratory                    
respiratory effort/rhythm                    Overall:                    normal 
rate                    06/15/2016                    None                

 

 Full Exam - General                     Cardiovascular                    
inspection of carotid pulses                    Overall:                    
strong, bilaterally equal, no bruits                    06/15/2016             
       None                

 

 Full Exam - General                     Cardiovascular                    
extremities                    Overall:                    no clubbing         
           06/15/2016                    None                

 

 Full Exam - General                     Cardiovascular                    
auscultation of heart                    Overall:                    regular 
rate                    06/15/2016                    None                

 

 Full Exam - General                     Cardiovascular                    
auscultation of heart                    Overall:                    normal 
heart sounds                    06/15/2016                    None             
   

 

 Full Exam - General                     Cardiovascular                    
auscultation of heart                    Overall:                    no murmurs
                    06/15/2016                    None                

 

 Full Exam - General                     Abdomen                    
abdominal exam                    Overall:                    no tenderness    
                06/15/2016                    None                

 

 Full Exam - General                     Abdomen                    
abdominal exam                    Overall:                    normal bowel 
sounds                    06/15/2016                    None                

 

 Full Exam - General                     Abdomen                    liver 
and spleen exam                    Overall:                    no 
hepatosplenomegaly                    06/15/2016                    None       
         

 

 Full Exam - General                     Lymphatic                    neck 
nodes                    Overall:                    anterior cervical chain 
benign                    06/15/2016                    None                

 

 Full Exam - General                     Lymphatic                    neck 
nodes                    Overall:                    posterior cervical chain 
benign                    06/15/2016                    None                

 

 Full Exam - General                     Musculoskeletal                    
digits and nails                    Digits:                    a normal exam   
                 06/15/2016                    None                

 

 Full Exam - General                     Musculoskeletal                    
spine, ribs and pelvis                    Overall:                    spine 
benign                    06/15/2016                    None                

 

 Full Exam - General                     Musculoskeletal                    
spine, ribs and pelvis                    Overall:                    
sacroiliac joint benign                    06/15/2016                    None  
              

 

 Full Exam - General                     Musculoskeletal                    
spine, ribs and pelvis                    Overall:                    good 
posture                    06/15/2016                    None                

 

 Full Exam - General                     Musculoskeletal                    
gait and station                    Overall:                    normal gait    
                06/15/2016                    None                

 

 Full Exam - General                     Musculoskeletal                    
gait and station                    Overall:                    normal station 
                   06/15/2016                    None                

 

 Full Exam - General                     Musculoskeletal                    
head and neck                    Overall:                    head atraumatic   
                 06/15/2016                    None                

 

 Full Exam - General                     Musculoskeletal                    
head and neck                    Overall:                    cervical spine 
benign                    06/15/2016                    None                

 

 Full Exam - General                     Integument                    
inspection of skin                    Overall:                    few scattered 
moles, no gross abnormalities                    06/15/2016                    
dermatofibroma of leg, darkening of skin on lower back.                

 

 Full Exam - General                     Neurologic                    deep 
tendon reflexes                    Overall:                    deep tendon 
reflexes intact                    06/15/2016                    None          
      

 

 Full Exam - General                     Neurologic                    
cranial nerves                    Overall:                    crainial nerves 2 
- 12 grossly intact                    06/15/2016                    None      
          

 

 Full Exam - General                     Psychiatric                    
orientation/consciousness                    Overall:                    
oriented to person, place and time                    06/15/2016               
     None                

 

 Full Exam - General                     Psychiatric                    
mood and affect                    Overall:                    normal mood and 
affect                    06/15/2016                    None                

 

 Full Exam - General                     Constitutional                     
general appearance                    Development:                    well 
developed                    2015                    None                

 

 Full Exam - General                     Constitutional                     
general appearance                    Development:                    appears 
stated age                    2015                    None                

 

 Full Exam - General                     Constitutional                     
general appearance                    Hygiene/Attention to Grooming:           
         good hygiene                    2015                    None    
            

 

 Full Exam - General                     Eyes                    conjunctiva
/eyelids                    Overall:                    conjunctiva clear      
              2015                    None                

 

 Full Exam - General                     Eyes                    conjunctiva
/eyelids                    Overall:                    cornea clear           
         2015                    None                

 

 Full Exam - General                     Eyes                    conjunctiva
/eyelids                    Overall:                    eyelids normal         
           2015                    None                

 

 Full Exam - General                     Eyes                    pupils and 
irises                    Overall:                    pupils equal, round, 
reactive to light and accomodation                    2015               
     None                

 

 Full Exam - General                     Ears/Nose/Throat                  
  otoscopic exam                    Overall:                    external 
auditory canals clear                    2015                    None    
            

 

 Full Exam - General                     Ears/Nose/Throat                  
  otoscopic exam                    Overall:                    tympanic 
membranes clear                    2015                    None          
      

 

 Full Exam - General                     Ears/Nose/Throat                  
  lips/teeth/gingiva                    Overall:                    benign lips
                    2015                    None                

 

 Full Exam - General                     Ears/Nose/Throat                  
  lips/teeth/gingiva                    Overall:                    normal 
dentition                    2015                    None                

 

 Full Exam - General                     Ears/Nose/Throat                  
  oral cavity/pharynx/larynx                     Overall:                    
oral mucosa clear                    2015                    None        
        

 

 Full Exam - General                     Ears/Nose/Throat                  
  oral cavity/pharynx/larynx                     Overall:                    
oropharyngeal mucosa clear                    2015                    
None                

 

 Full Exam - General                     Ears/Nose/Throat                  
  oral cavity/pharynx/larynx                     Overall:                    
hypopharynx benign                    2015                    None       
         

 

 Full Exam - General                     Ears/Nose/Throat                  
  oral cavity/pharynx/larynx                     Overall:                    no 
masses                    2015                    None                

 

 Full Exam - General                     Respiratory                    
auscultation                    Overall:                    breath sounds clear 
bilaterally                    2015                    None              
  

 

 Full Exam - General                     Respiratory                    
respiratory effort/rhythm                    Overall:                    no 
retractions                    2015                    None              
  

 

 Full Exam - General                     Respiratory                    
respiratory effort/rhythm                    Overall:                    normal 
rate                    2015                    None                

 

 Full Exam - General                     Cardiovascular                    
extremities                    Overall:                    no clubbing         
           2015                    None                

 

 Full Exam - General                     Cardiovascular                    
auscultation of heart                    Overall:                    regular 
rate                    2015                    None                

 

 Full Exam - General                     Cardiovascular                    
auscultation of heart                    Overall:                    normal 
heart sounds                    2015                    None             
   

 

 Full Exam - General                     Abdomen                    
abdominal exam                    Overall:                    no tenderness    
                2015                    None                

 

 Full Exam - General                     Abdomen                    
abdominal exam                    Overall:                    normal bowel 
sounds                    2015                    None                

 

 Full Exam - General                     Lymphatic                    neck 
nodes                    Overall:                    anterior cervical chain 
benign                    2015                    None                

 

 Full Exam - General                     Lymphatic                    neck 
nodes                    Overall:                    posterior cervical chain 
benign                    2015                    None                

 

 Full Exam - General                     Musculoskeletal                    
spine, ribs and pelvis                    Overall:                    spine 
benign                    2015                    None                

 

 Full Exam - General                     Musculoskeletal                    
spine, ribs and pelvis                    Overall:                    
sacroiliac joint benign                    2015                    None  
              

 

 Full Exam - General                     Musculoskeletal                    
spine, ribs and pelvis                    Overall:                    good 
posture                    2015                    None                

 

 Full Exam - General                     Musculoskeletal                    
head and neck                    Overall:                    head atraumatic   
                 2015                    None                

 

 Full Exam - General                     Musculoskeletal                    
head and neck                    Overall:                    cervical spine 
benign                    2015                    None                

 

 Full Exam - General                     Neurologic                    deep 
tendon reflexes                    Overall:                    deep tendon 
reflexes intact                    2015                    None          
      

 

 Full Exam - General                     Neurologic                    
cranial nerves                    Overall:                    crainial nerves 2 
- 12 grossly intact                    2015                    None      
          

 

 Full Exam - General                     Psychiatric                    
orientation/consciousness                    Overall:                    
oriented to person, place and time                    2015               
     None                

 

 Full Exam - General                     Psychiatric                    
mood and affect                    Overall:                    normal mood and 
affect                    2015                    None                

 

 Full Exam - General                     Ears/Nose/Throat                  
  external ear                    Overall:                    normal appearance
                    2015                    None                

 

 Full Exam - General                     Ears/Nose/Throat                  
  external nose                    Overall:                    benign 
appearance                    2015                    None                

 

 Full Exam - General                     Ears/Nose/Throat                  
  hearing assessment                    Overall:                    hearing 
intact bilaterally                    2015                    None       
         

 

 Full Exam - General                     Ears/Nose/Throat                  
  lips/teeth/gingiva                    Overall:                    benign 
gingiva                    2015                    None                

 

 Full Exam - General                     Neck                    thyroid   
                 Overall:                    no mass lesions                    
2015                    None                

 

 Full Exam - General                     Neck                    thyroid   
                 Overall:                    nontender                    2015                    None                

 

 Full Exam - General                     Cardiovascular                    
inspection of carotid pulses                    Overall:                    
strong, bilaterally equal, no bruits                    2015             
       None                

 

 Full Exam - General                     Cardiovascular                    
auscultation of heart                    Overall:                    no murmurs
                    2015                    None                

 

 Full Exam - General                     Abdomen                    liver 
and spleen exam                    Overall:                    no 
hepatosplenomegaly                    2015                    None       
         

 

 Full Exam - General                     Musculoskeletal                    
digits and nails                    Digits:                    a normal exam   
                 2015                    None                

 

 Full Exam - General                     Musculoskeletal                    
gait and station                    Overall:                    normal gait    
                2015                    None                

 

 Full Exam - General                     Musculoskeletal                    
gait and station                    Overall:                    normal station 
                   2015                    None                

 

 Full Exam - General                     Integument                    
inspection of skin                    Overall:                    few scattered 
moles, no gross abnormalities                    2015                    
dermatofibroma of leg, darkening of skin on lower back.                

 

 Full Exam - General                     Constitutional                     
general appearance                    Overall:                    well 
nourished                    2013                    None                

 

 Full Exam - General                     Constitutional                     
general appearance                    Overall:                    well 
developed                    2013                    None                

 

 Full Exam - General                     Constitutional                     
general appearance                    Overall:                    in no acute 
distress                    2013                    None                

 

 Full Exam - General                     Psychiatric                    
orientation/consciousness                    Overall:                    
oriented to person, place and time                    2013               
     None                

 

 Full Exam - General                     Psychiatric                    
mood and affect                    Mood:                    happy              
      2013                    None                

 

 Full Exam - General                     Psychiatric                    
mood and affect                    Overall:                    normal mood and 
affect                    2013                    None                

 

 Full Exam - General                     Musculoskeletal                    
gait and station                    Overall:                    normal gait    
                2013                    None                

 

 Full Exam - General                     Musculoskeletal                    
gait and station                    Overall:                    normal station 
                   2013                    None                

 

 Full Exam - General                     Cardiovascular                    
auscultation of heart                    Overall:                    regular 
rate                    2013                    None                

 

 Full Exam - General                     Cardiovascular                    
auscultation of heart                    Overall:                    normal 
heart sounds                    2013                    None             
   

 

 Full Exam - General                     Cardiovascular                    
auscultation of heart                    Overall:                    no murmurs
                    2013                    None                

 

 Full Exam - General                     Respiratory                    
respiratory effort/rhythm                    Overall:                    normal 
rate                    2013                    None                

 

 Full Exam - General                     Respiratory                    
respiratory effort/rhythm                    Overall:                    no 
retractions                    2013                    None              
  

 

 Full Exam - General                     Respiratory                    
auscultation                    Overall:                    breath sounds clear 
bilaterally                    2013                    None              
  

 

 Full Exam - General                     Musculoskeletal                    
lower extremity                    Inspection -  ankle:                    a 
normal exam                    2013                    None              
  

 

 Full Exam - General 1995                    Musculoskeletal                    
lower extremity                    Palpation -  ankle:                    a 
normal exam                    2013                    None              
  

 

 Full Exam - General 1995                    Musculoskeletal                    
lower extremity                    Palpation -  ankle:                    
tender @ achilles tendon                    2013                    None 
               

 

 Full Exam - General                     Musculoskeletal                    
lower extremity                    Palpation -  ankle:                    
tender @ plantar fascia insertion                    2013                
    None                

 

 Full Exam - General 1995                    Ears/Nose/Throat                  
  oral cavity/pharynx/larynx                     Overall:                    
oropharyngeal mucosa clear                    2013                    
None                

 

 Full Exam - General 1995                    Ears/Nose/Throat                  
  oral cavity/pharynx/larynx                     Overall:                    no 
masses                    2013                    None                

 

 Full Exam - General 1995                    Ears/Nose/Throat                  
  oral cavity/pharynx/larynx                     Overall:                    
oral mucosa clear                    2013                    None        
        

 

 Full Exam - General                     Ears/Nose/Throat                  
  otoscopic exam                    Overall:                    tympanic 
membranes clear                    2013                    None          
      

 

 Full Exam - General                     Ears/Nose/Throat                  
  otoscopic exam                    Overall:                    external 
auditory canals clear                    2013                    None    
            

 

 Full Exam - General                     Eyes                    pupils and 
irises                    Overall:                    pupils equal, round, 
reactive to light and accomodation                    2013               
     None                

 

 Full Exam - General                     Constitutional                     
general appearance                    Overall:                    well 
developed                    2013                    None                

 

 Full Exam - General                     Constitutional                     
general appearance                    Overall:                    in no acute 
distress                    2013                    None                

 

 Full Exam - General                     Constitutional                     
general appearance                    Overall:                    well 
nourished                    2013                    None                

 

 Full Exam - General                     Respiratory                    
auscultation                    Overall:                    breath sounds clear 
bilaterally                    2013                    None              
  

 

 Full Exam - General                     Respiratory                    
respiratory effort/rhythm                    Overall:                    no 
retractions                    2013                    None              
  

 

 Full Exam - General                     Respiratory                    
respiratory effort/rhythm                    Overall:                    normal 
rate                    2013                    None                

 

 Full Exam - General                     Cardiovascular                    
auscultation of heart                    Overall:                    regular 
rate                    2013                    None                

 

 Full Exam - General                     Cardiovascular                    
auscultation of heart                    Overall:                    normal 
heart sounds                    2013                    None             
   

 

 Full Exam - General                     Cardiovascular                    
auscultation of heart                    Overall:                    no murmurs
                    2013                    None                

 

 Full Exam - General                     Integument                    
inspection of skin                    Dermatitis:                    erythema  
                  2013                    None                

 

 Full Exam - General                     Integument                    
inspection of skin                    Dermatitis:                    dryness/
flaking                    2013                    None                

 

 Full Exam - General                     Integument                    
inspection of skin                    Dermatitis:                    scaling   
                 2013                    None                

 

 Full Exam - General                     Integument                    
inspection of skin                    Dermatitis:                    thickened 
                   2013                    None                

 

 Full Exam - General                     Integument                    
inspection of skin                    Dermatitis:                    
lichenification                    2013                    None          
      

 

 Full Exam - General                     Integument                    
inspection of skin                    Location:                    right arm   
                 2013                    None                

 

 Full Exam - General                     Neurologic                    gait
                    Overall:                    no ataxia, no unsteadiness     
               2013                    None                

 

 Full Exam - General                     Psychiatric                    
orientation/consciousness                    Overall:                    
oriented to person, place and time                    2013               
     None                

 

 Full Exam - General                     Psychiatric                    
mood and affect                    Overall:                    normal mood and 
affect                    2013                    None                

 

 Full Exam - General                     Psychiatric                    
mood and affect                    Mood:                    happy              
      2013                    None                

 

 Full Exam - General                     Integument                    
inspection of skin                    Location:                    left hand   
                 2013                    None                

 

 Full Exam - General                     Integument                    
inspection of skin                    Location:                    right hand  
                  2013                    None                

 

 Full Exam - General                     Constitutional                     
general appearance                    Overall:                    well 
developed                    2012                    None                

 

 Full Exam - General                     Constitutional                     
general appearance                    Overall:                    in no acute 
distress                    2012                    None                

 

 Full Exam - General                     Constitutional                     
general appearance                    Overall:                    well 
nourished                    2012                    None                

 

 Full Exam - General                     Respiratory                    
auscultation                    Overall:                    breath sounds clear 
bilaterally                    2012                    None              
  

 

 Full Exam - General                     Respiratory                    
respiratory effort/rhythm                    Overall:                    no 
retractions                    2012                    None              
  

 

 Full Exam - General                     Respiratory                    
respiratory effort/rhythm                    Overall:                    normal 
rate                    2012                    None                

 

 Full Exam - General                     Cardiovascular                    
auscultation of heart                    Overall:                    regular 
rate                    2012                    None                

 

 Full Exam - General                     Cardiovascular                    
auscultation of heart                    Overall:                    normal 
heart sounds                    2012                    None             
   

 

 Full Exam - General                     Cardiovascular                    
auscultation of heart                    Overall:                    no murmurs
                    2012                    None                

 

 Full Exam - General                     Integument                    
inspection of skin                    Dermatitis:                    erythema  
                  2012                    None                

 

 Full Exam - General                     Integument                    
inspection of skin                    Dermatitis:                    dryness/
flaking                    2012                    None                

 

 Full Exam - General                     Integument                    
inspection of skin                    Dermatitis:                    scaling   
                 2012                    None                

 

 Full Exam - General                     Integument                    
inspection of skin                    Dermatitis:                    thickened 
                   2012                    None                

 

 Full Exam - General                     Integument                    
inspection of skin                    Dermatitis:                    
lichenification                    2012                    None          
      

 

 Full Exam - General                     Integument                    
inspection of skin                    Dermatitis:                    
excoriation                    2012                    over dorsum of the 
right hand                

 

 Full Exam - General                     Neurologic                    gait
                    Overall:                    no ataxia, no unsteadiness     
               2012                    None                

 

 Full Exam - General                     Psychiatric                    
orientation/consciousness                    Overall:                    
oriented to person, place and time                    2012               
     None                

 

 Full Exam - General                     Psychiatric                    
mood and affect                    Overall:                    normal mood and 
affect                    2012                    None                

 

 Full Exam - General                     Psychiatric                    
mood and affect                    Mood:                    happy              
      2012                    None                

 

 Full Exam - General                     Ears/Nose/Throat                  
  otoscopic exam                    Overall:                    external 
auditory canals clear                    2012                    None    
            

 

 Full Exam - General                     Ears/Nose/Throat                  
  otoscopic exam                    Overall:                    tympanic 
membranes clear                    2012                    None          
      

 

 Full Exam - General                     Ears/Nose/Throat                  
  oral cavity/pharynx/larynx                     Overall:                    
oral mucosa clear                    2012                    None        
        

 

 Full Exam - General                     Integument                    
inspection of skin                    Dermatitis:                    scaling   
                 2012                    None                

 

 Full Exam - General                     Integument                    
inspection of skin                    Dermatitis:                    thickened 
                   2012                    None                

 

 Full Exam - General                     Integument                    
inspection of skin                    Dermatitis:                    
lichenification                    2012                    None          
      

 

 Full Exam - General                     Neurologic                    gait
                    Overall:                    no ataxia, no unsteadiness     
               2012                    None                

 

 Full Exam - General                     Psychiatric                    
orientation/consciousness                    Overall:                    
oriented to person, place and time                    2012               
     None                

 

 Full Exam - General                     Psychiatric                    
mood and affect                    Overall:                    normal mood and 
affect                    2012                    None                

 

 Full Exam - General                     Psychiatric                    
mood and affect                    Mood:                    happy              
      2012                    None                

 

 Full Exam - General                     Integument                    
inspection of skin                    Location:                    right arm   
                 2012                    None                

 

 Full Exam - General                     Integument                    
inspection of skin                    Location:                    left arm    
                2012                    None                

 

 Full Exam - General                     Constitutional                     
general appearance                    Overall:                    well 
developed                    2012                    None                

 

 Full Exam - General                     Constitutional                     
general appearance                    Overall:                    in no acute 
distress                    2012                    None                

 

 Full Exam - General                     Constitutional                     
general appearance                    Overall:                    well 
nourished                    2012                    None                

 

 Full Exam - General                     Respiratory                    
auscultation                    Overall:                    breath sounds clear 
bilaterally                    2012                    None              
  

 

 Full Exam - General 1995                    Respiratory                    
respiratory effort/rhythm                    Overall:                    no 
retractions                    2012                    None              
  

 

 Full Exam - General 1995                    Respiratory                    
respiratory effort/rhythm                    Overall:                    normal 
rate                    2012                    None                

 

 Full Exam - General 1995                    Cardiovascular                    
auscultation of heart                    Overall:                    regular 
rate                    2012                    None                

 

 Full Exam - General 1995                    Cardiovascular                    
auscultation of heart                    Overall:                    normal 
heart sounds                    2012                    None             
   

 

 Full Exam - General                     Cardiovascular                    
auscultation of heart                    Overall:                    no murmurs
                    2012                    None                

 

 Full Exam - General 1995                    Integument                    
inspection of skin                    Dermatitis:                    erythema  
                  2012                    None                

 

 Full Exam - General                     Integument                    
inspection of skin                    Dermatitis:                    dryness/
flaking                    2012                    None                

 

 Full Exam - General                     Psychiatric                    
orientation/consciousness                    Overall:                    
oriented to person, place and time                    2012               
     None                

 

 Full Exam - General                     Psychiatric                    
mood and affect                    Mood:                    happy              
      2012                    None                

 

 Full Exam - General                     Psychiatric                    
mood and affect                    Overall:                    normal mood and 
affect                    2012                    None                

 

 Full Exam - General                     Neurologic                    gait
                    Overall:                    no ataxia, no unsteadiness     
               2012                    None                

 

 Full Exam - General 1995                    Integument                    
inspection of skin                    Dermatitis:                    erythema  
                  2012                    None                

 

 Full Exam - General                     Integument                    
inspection of skin                    Dermatitis:                    dryness/
flaking                    2012                    None                

 

 Full Exam - General                     Integument                    
inspection of skin                    Dermatitis:                    scaling   
                 2012                    None                

 

 Full Exam - General                     Integument                    
inspection of skin                    Dermatitis:                    thickened 
                   2012                    None                

 

 Full Exam - General                     Integument                    
inspection of skin                    Dermatitis:                    
lichenification                    2012                    None          
      

 

 Full Exam - General                     Integument                    
inspection of skin                    Dermatitis:                    
excoriation                    2012                    over dorsum of the 
right hand and over the right mid abdomen                

 

 Full Exam - General                     Integument                    
palpation                    Hand:                    tender                    
2012                    None                

 

 Full Exam - General                     Cardiovascular                    
auscultation of heart                    Overall:                    regular 
rate                    2012                    None                

 

 Full Exam - General                     Cardiovascular                    
auscultation of heart                    Overall:                    normal 
heart sounds                    2012                    None             
   

 

 Full Exam - General                     Cardiovascular                    
auscultation of heart                    Overall:                    no murmurs
                    2012                    None                

 

 Full Exam - General                     Respiratory                    
auscultation                    Overall:                    breath sounds clear 
bilaterally                    2012                    None              
  

 

 Full Exam - General                     Respiratory                    
respiratory effort/rhythm                    Overall:                    normal 
rate                    2012                    None                

 

 Full Exam - General                     Respiratory                    
respiratory effort/rhythm                    Overall:                    no 
retractions                    2012                    None              
  

 

 Full Exam - General                     Constitutional                     
general appearance                    Overall:                    well 
nourished                    2012                    None                

 

 Full Exam - General                     Constitutional                     
general appearance                    Overall:                    well 
developed                    2012                    None                

 

 Full Exam - General                     Constitutional                     
general appearance                    Overall:                    in no acute 
distress                    2012                    None                



                                                                              



Procedures

                      





 Procedure                    Codes                    Date                

 

                     TRIAMCINOLONE ACET INJ NOS                                
      CPT-4:                     2017                

 

                     TRIAMCINOLONE ACET INJ NOS                                
      CPT-4:                     05/15/2017                

 

                     ROCEPHIN,  MG                                      
CPT-4:                     2012                

 

                     KETOROLAC TROMETHAMINE INJ                                
      CPT-4:                     2012                

 

                     THER/PROPH/DIAG INJ SC/IM                                 
     CPT-4: 78347                    2012                

 

                     TRIAMCINOLONE ACET INJ NOS                                
      CPT-4:                     2012                

 

                     THER/PROPH/DIAG INJ SC/IM                                 
     CPT-4: 77090                    2012                



                                                                               
                                                           



Vital Signs

                      





 Date                    Vital                









 2018                                        Blood Pressure 1: 122/80 Code
: 8480-6                                                          BMI: 26.6 Code
: 22546-5                                                          Heart Rate 1
: 76 bpm                                                          Height: 6'2" 
                                                         SpO2: 96%             
                                             Weight: 210 lbs                   
                

 

 2018                                        Blood Pressure 1: 130/70 Code
: 8480-6                                                          BMI: 26.9 Code
: 83413-0                                                          Heart Rate 1
: 83 bpm                                                          Height: 6'2" 
                                                         SpO2: 98%             
                                             Weight: 212 lbs                   
                

 

 2017                                        Blood Pressure 1: 122/74 Code
: 8480-6                                                          BMI: 28.1 Code
: 31133-9                                                          Heart Rate 1
: 88 bpm                                                          Height: 6'2" 
                                                         SpO2: 99%             
                                             Weight: 222 lbs                   
                









 05/15/2017                                        Blood Pressure 1: 128/80 Code
: 8480-6                                                          BMI: 29.4 Code
: 91696-4                                                          Heart Rate 1
: 92 bpm                                                          Height: 6'2" 
                                                         SpO2: 98%             
                                             Temperature: 36.6 (C) / 97.8 (F)  
                                                        Weight: 232 lbs        
                           









 2017                                        Blood Pressure 1: 124/78 Code
: 8480-6                                                          BMI: 29.4 Code
: 46511-5                                                          Heart Rate 1
: 78 bpm                                                          Height: 6'2" 
                                                         SpO2: 96%             
                                             Weight: 232 lbs                   
                

 

 2017                                        Blood Pressure 1: 128/82 Code
: 8480-6                                                          BMI: 31.0 Code
: 06093-7                                                          Heart Rate 1
: 80 bpm                                                          Height: 6'2" 
                                                         SpO2: 98%             
                                             Weight: 245 lbs                   
                

 

 06/15/2016                                        Blood Pressure 1: 126/74 Code
: 8480-6                                                          BMI: 29.9 Code
: 59086-7                                                          Heart Rate 1
: 71 bpm                                                          Height: 6'2" 
                                                         SpO2: 98%             
                                             Weight: 236 lbs                   
                









 2015                                        Blood Pressure 1: 102/74 Code
: 8480-6                                                          BMI: 30.4 Code
: 39446-0                                                          Heart Rate 1
: 76 bpm                                                          Height: 6'2" 
                                                         Weight: 240 lbs       
                            









 2013                                        Blood Pressure 1: 122/82 Code
: 8480-6                                                          BMI: 32.8 Code
: 90035-7                                                          Heart Rate 1
: 72 bpm                                                          Height: 6'   
                                                       Respiratory Rate: 16 bpm
                                                          Weight: 245 lbs      
                             









 2013                                        Blood Pressure 1: 126/74 Code
: 8480-6                                                          BMI: 31.6 Code
: 08773-1                                                          Heart Rate 1
: 80 bpm                                                          Height: 6'2" 
                                                         Weight: 246 lbs       
                            

 

 2012                                        Blood Pressure 1: 126/74 Code
: 8480-6                                                          Heart Rate 1: 
72 bpm                                                          Respiratory Rate
: 16 bpm                                                          Temperature: 
36.8 (C) / 98.3 (F)                                                          
Weight: 240 lbs                                   

 

 2012                                        Blood Pressure 1: 130/80 Code
: 8480-6                                                          BMI: 30.1 Code
: 76151-3                                                          Heart Rate 1
: 62 bpm                                                          Height: 6'2" 
                                                         Weight: 237 lbs 8 oz  
                                









 2012                                        Blood Pressure 1: 140/72 Code
: 8480-6                                                          BMI: 32.1 Code
: 16820-0                                                          Heart Rate 1
: 68 bpm                                                          Height: 6'   
                                                       Respiratory Rate: 16 bpm
                                                          Weight: 237 lbs      
                             



                                                                               
                                                                               
                                                  



Functional Status

          No Functional Status data                                            
              



History of Present Illness

                      





 Symptom Name                    Status                    Result              
      Effective Date                    Notes                

 

 nausea                    Frequency of Episodes                    unchanged  
                  2018                    None                

 

 weight loss                    Onset and Resolution                    ongoing
                    2018                    None                

 

 dizziness                    Quality                    constant              
      2018                    None                

 

 dizziness                    Quality                    acute                 
   2018                    None                

 

 dizziness                    Onset and Resolution                    sudden in 
onset                    2018                    None                

 

 dizziness                    Onset of Symptom                    5 days ago   
                 2018                    None                

 

 dizziness                    Frequency of Episodes                    daily   
                 2018                    None                

 

 dizziness                    Triggers                    no known associated 
factors                    2018                    None                

 

 dizziness                    Pertinent Findings                    nausea     
               2018                    None                

 

 wrist pain                    Location                    on the left         
           2017                    None                

 

 wrist pain                    Quality                    constant             
       2017                    None                

 

 wrist pain                    Onset and Resolution                    sudden 
in onset                    2017                    None                

 

 wrist pain                    Onset of Symptom                    24 hours ago
                    2017                    None                

 

 cough                    Location                    in the lung              
      05/15/2017                    None                

 

 cough                    Quality                    constant                  
  05/15/2017                    None                

 

 cough                    Quality                    hacking                    
05/15/2017                    None                

 

 cough                    Quality                    productive                
    05/15/2017                    None                

 

 cough                    Onset and Resolution                    sudden in 
onset                    05/15/2017                    None                

 

 cough                    Onset of Symptom                    2 weeks ago      
              05/15/2017                    None                

 

 cough                    Frequency of Episodes                    daily       
             05/15/2017                    None                

 

 cough                    Pertinent Findings                    Denies chest 
discomfort                    05/15/2017                    None                

 

 cough                    Pertinent Findings                    hoarseness     
               05/15/2017                    None                

 

 cough                    Pertinent Findings                    nasal 
congestion                    05/15/2017                    None                

 

 cough                    Pertinent Findings                    sputum 
production                    05/15/2017                    None                

 

 sinus congestion                    Location                    frontal 
sinuses                    05/15/2017                    None                

 

 sinus congestion                    Quality                    pressure       
             05/15/2017                    None                

 

 sinus congestion                    Onset and Resolution                    
sudden in onset                    05/15/2017                    None          
      

 

 sinus congestion                    Onset of Symptom                    2 
weeks ago                    05/15/2017                    None                

 

 sinus congestion                    Frequency of Episodes                    
daily                    05/15/2017                    None                

 

 sinus congestion                    Pertinent Findings                    
cough                    05/15/2017                    None                

 

 sinus congestion                    Pertinent Findings                    
hoarseness                    05/15/2017                    None                

 

 dizziness                    Quality                    acute                 
   2017                    None                

 

 dizziness                    Onset and Resolution                    sudden in 
onset                    2017                    None                

 

 dizziness                    Limitation on Activities                    
moderately limits activities                    2017                    
None                

 

 dizziness                    Triggers                    head turning         
           2017                    None                

 

 dizziness                    Pertinent Findings                    blurred 
vision                    2017                    None                

 

 dizziness                    Pertinent Findings                    
lightheadedness                    2017                    None          
      

 

 dizziness                    Exacerbating Factors                    turning 
the head                    2017                    None                

 

 dizziness                    Exacerbating Factors                    
medication                    2017                    meloxicam          
      

 

 dizziness                    Onset of Symptom                    5 days ago   
                 2017                    None                

 

 dizziness                    Frequency of Episodes                    
decreasing                    2017                    None                

 

 wrist pain                    Location                    on the right        
            2017                    None                

 

 wrist pain                    Quality                    acute                
    2017                    None                

 

 wrist pain                    Limitation on Activities                    
moderately limits activities                    2017                    
None                

 

 wrist pain                    Pertinent Findings                    Denies 
numbness                    2017                    None                

 

 wrist pain                    Pertinent Findings                    redness   
                 2017                    None                

 

 wrist pain                    Pertinent Findings                    stiffness 
                   2017                    None                

 

 wrist pain                    Pertinent Findings                    swelling  
                  2017                    None                

 

 ankle pain                    Location                    on the right        
            06/15/2016                    None                

 

 ankle pain                    Quality                    dull pain            
        06/15/2016                    None                

 

 ankle pain                    Quality                    aching               
     06/15/2016                    None                

 

 ankle pain                    Location                    achilles tendon     
               06/15/2016                    None                

 

 ankle pain                    Onset and Resolution                    sudden 
in onset                    06/15/2016                    None                

 

 ankle pain                    Onset of Symptom                    2 weeks ago 
                   06/15/2016                    None                

 

 ankle pain                    Frequency of Episodes                    daily  
                  06/15/2016                    None                

 

 ankle pain                    Sports Participation                    
basketball                    06/15/2016                    None                

 

 ankle pain                    Sports Participation                    baseball
                    06/15/2016                    None                

 

 ankle pain                    Sports Participation                    running 
                   06/15/2016                    None                

 

 ankle pain                    Pertinent Findings                    stiffness 
                   06/15/2016                    None                

 

 ankle pain                    Pertinent Findings                    pain with 
movement                    06/15/2016                    None                

 

 well man exam (18-39 years)                    Birth Control                  
  male condom                    06/15/2016                    None            
    

 

 well man exam (18-39 years)                    Nutrition and Exercise         
           normal weight                    06/15/2016                    None 
               

 

 well man exam (18-39 years)                    Nutrition and Exercise         
           balanced nutrition                    06/15/2016                    
None                

 

 well man exam (18-39 years)                    Nutrition and Exercise         
           regular diet                    06/15/2016                    None  
              

 

 well man exam (18-39 years)                    Nutrition and Exercise         
           moderate exercise                    06/15/2016                    
None                

 

 skin lesion                    Onset of Symptom                    _ years ago
                    2015                    None                

 

 skin lesion                    Quality                    flat                
    2015                    Denies pain.                  

 

 well man exam (18-39 years)                    Sexual Activity                
    experiences sexual satisfaction                    2015              
      None                

 

 well man exam (18-39 years)                    Sexual Activity                
    is monogamous                    2015                    None        
        

 

 well man exam (18-39 years)                    Birth Control                  
  regular use                    2015                    None            
    

 

 well man exam (18-39 years)                    Lifestyle                    
family supportive of relationship                    2015                
    None                

 

 well man exam (18-39 years)                    Lifestyle                    
normal amount of stress                    2015                    None  
              

 

 well man exam (18-39 years)                    Lifestyle                    
normal sleep patterns                    2015                    None    
            

 

 well man exam (18-39 years)                    Lifestyle                    
regular seatbelt use                    2015                    None     
           

 

 well man exam (18-39 years)                    Lifestyle                    
satisfactory marriage/partner relationship                    2015       
             None                

 

 well man exam (18-39 years)                    Lifestyle                    
satisfactory work experience                    2015                    
None                

 

 well man exam (18-39 years)                    Nutrition and Exercise         
           balanced nutrition                    2015                    
None                

 

 well man exam (18-39 years)                    Nutrition and Exercise         
           moderate exercise                    2015                    
None                

 

 well man exam (18-39 years)                    Reproductive System Development
                    normal puberty                    2015               
     None                

 

 well man exam (18-39 years)                    Health Guidance                
    cholesterol level and lipid panel                    2015            
        None                

 

 well man exam (18-39 years)                    Health Guidance                
    regular exercise                    2015                    None     
           

 

 well man exam (18-39 years)                    Health Guidance                
    safety belt use                    2015                    None      
          

 

 well man exam (18-39 years)                    Health Guidance                
    tobacco, drugs and alcohol avoidance                    2015         
           None                

 

 ankle pain                    Location                    on the right        
            2013                    None                

 

 ankle pain                    Quality                    sharp pain           
         2013                    alternating dull pain.  states it is 
worse after sitting and getting up and walking agin                

 

 ankle pain                    Pertinent Findings                    Denies 
catching                    2013                    None                

 

 ankle pain                    Pertinent Findings                    Denies 
clicking                    2013                    None                

 

 ankle pain                    Pertinent Findings                    Denies 
numbness                    2013                    None                

 

 ankle pain                    Pertinent Findings                    Denies 
redness                    2013                    None                

 

 ankle pain                    Pertinent Findings                    stiffness 
                   2013                    None                

 

 ankle pain                    Pertinent Findings                    Denies 
swelling                    2013                    None                

 

 ankle pain                    Onset of Symptom                    2-3 weeks 
ago                    2013                    states he thinks he 
originally injured it while playing football with friends last fall            
    

 

 ankle pain                    Severity                    mild                
    2013                    None                

 

 ankle pain                    Significant Medical Conditions                  
  degenerative joint disease                    2013                    
None                

 

 skin lesion                    Quality                    chronic             
       2013                    None                

 

 skin lesion                    Quality                    flat                
    2013                    None                

 

 skin lesion                    Quality                    non-tender          
          2013                    None                

 

 skin lesion                    Quality                    worsening           
         2013                    None                

 

 skin lesion                    Location                    left arm           
         2013                    also on hands bilat.                

 

 skin lesion                    Onset and Resolution                    ongoing
                    2013                    None                

 

 skin lesion                    Pertinent Findings                    Denies 
cough                    2013                    None                

 

 skin lesion                    Pertinent Findings                    Denies 
ecchymotic                    2013                    None                

 

 skin lesion                    Alleviating Factors                    
medication                    2013                    had ointment for 
lesions that helps temporarily                

 

 skin lesion                    Severity                    moderate           
         2013                    None                

 

 skin lesion                    Frequency of Episodes                    
unchanged                    2013                    None                

 

 skin lesion                    Triggers                    no known associated 
factors                    2013                    None                

 

 headache                    Onset and Resolution                    sudden in 
onset                    2012                    None                

 

 headache                    Onset of Symptom                    3 days ago    
                2012                    None                

 

 sore throat                    Quality                    acute               
     2012                    None                

 

 sore throat                    Pertinent Findings                    cough    
                2012                    None                

 

 rash                    Location-Major                    on the hands        
            2012                    on right hand                

 

 rash                    Onset of Symptom                    1 month ago       
             2012                    recurrence of former rash, using 
itrakonazole with no results                

 

 headache                    Location                    diffusely             
       2012                    None                

 

 headache                    Quality                    acute                  
  2012                    None                

 

 headache                    Onset and Resolution                    ongoing   
                 2012                    None                

 

 headache                    Limitation on Activities                    does 
not limit activities                    2012                    None     
           

 

 headache                    Frequency of Episodes                    
increasing                    2012                    None                

 

 headache                    Significant Medical Conditions                    
allergic rhinitis                    2012                    None        
        

 

 headache                    Triggers                    no known associated 
factors                    2012                    None                

 

 sore throat                    Location                    diffusely          
          2012                    None                

 

 sore throat                    Onset and Resolution                    ongoing
                    2012                    None                

 

 sore throat                    Limitation on Activities                    
does not limit oral intake                    2012                    
None                

 

 sore throat                    Significant Medical Conditions                 
   allergic rhinitis                    2012                    None     
           

 

 sore throat                    Triggers                    no known associated 
factors                    2012                    None                

 

 flare up of rash                    Alleviating Factors                    no 
alleviating factors                    2012                    None      
          

 

 flare up of rash                    Pertinent Findings                    
history of similar rash                    2012                    None  
              

 

 flare up of rash                    Pertinent Findings                    
itching                    2012                    None                

 

 flare up of rash                    Pertinent Findings                    
tenderness                    2012                    None                

 

 rash                    Location-Major                    on the arms         
           2012                    None                

 

 rash                    Quality                    expanding                  
  2012                    None                

 

 rash                    Color                    red                    2012                    None                

 

 flare up of rash                    Quality                    dry            
        2012                    None                

 

 flare up of rash                    Quality                    flaking        
            2012                    None                

 

 flare up of rash                    Onset and Resolution                    
gradual in onset                    2012                    None         
       

 

 flare up of rash                    Limitation on Activities                  
  does not limit activities                    2012                    
None                

 

 flare up of rash                    Severity                    mild          
          2012                    None                

 

 flare up of rash                    Severity                    worsening     
               2012                    None                

 

 flare up of rash                    Triggers                    no known 
triggers                    2012                    None                

 

 flare up of rash                    Location-Extremities                    on 
the right hand                    2012                    None           
     

 

 flare up of rash                    Quality                    dry            
        2012                    None                

 

 flare up of rash                    Quality                    flaking        
            2012                    None                

 

 flare up of rash                    Onset of Symptom                    3 
months ago                    2012                    None                

 

 flare up of rash                    Color                    black            
        2012                    None                

 

 flare up of rash                    Color                    erythematous     
               2012                    None                

 

 flare up of rash                    Onset and Resolution                    
gradual in onset                    2012                    None         
       

 

 flare up of rash                    Limitation on Activities                  
  does not limit activities                    2012                    
None                

 

 flare up of rash                    Severity                    mild          
          2012                    None                

 

 flare up of rash                    Severity                    worsening     
               2012                    None                

 

 flare up of rash                    Prior Treatments                    
partially responsive to treatment                    2012                
    None                

 

 flare up of rash                    Triggers                    no known 
triggers                    2012                    None                

 

 flare up of rash                    Alleviating Factors                    no 
alleviating factors                    2012                    None      
          

 

 flare up of rash                    Pertinent Findings                    
history of similar rash                    2012                    None  
              

 

 flare up of rash                    Pertinent Findings                    
itching                    2012                    None                

 

 flare up of rash                    Pertinent Findings                    
tenderness                    2012                    None                



                                                                    



Advance Directives

          No Advance Directive data                                            
                        



Encounters

                      





 Encounter                    Performer                    Location            
        Codes                    Date                

 

                      EST. PATIENT, LEVEL III

Diagnosis: Gastro-esophageal reflux disease without esophagitis[ICD10: K21.9]

Diagnosis: Abnormal weight loss[ICD10: R63.4]

Diagnosis: Other malaise[ICD10: R53.81]                                      
Franca Landrum MD, [facility]                    CPT-4
: 31126                    2018                

 

                     43805 EST. PATIENT, LEVEL IV

Diagnosis: Abnormal weight loss[ICD10: R63.4]

Diagnosis: Benign paroxysmal vertigo, bilateral[ICD10: H81.13]

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

Diagnosis: Other malaise[ICD10: R53.81]

Diagnosis: Other fatigue[ICD10: R53.83]                                      
Franca Landrum MD, [facility]                    CPT-4
: 35270                    2018                

 

                     87391 EST. PATIENT, LEVEL III

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                                   
   Franca Landrum MD, [facility]                    CPT
-4: 11926                    2017                

 

                     26936 EST. PATIENT, LEVEL IV

Diagnosis: Acute bronchitis due to other specified organisms[ICD10: J20.8]

Diagnosis: Other allergic rhinitis[ICD10: J30.89]                              
        Franca Landrum MD, [facility]                 
   CPT-4: 08726                    05/15/2017                

 

                     [id number]) 65481 EST. PATIENT, LEVEL III

Diagnosis: Benign paroxysmal vertigo, bilateral[ICD10: H81.13]                 
                     Klarissa Landrum MD, [facility]
                    CPT-4: 94645                    2017                

 

                     26279 EST. PATIENT, LEVEL III

Diagnosis: Pain in right wrist[ICD10: M25.531]                                 
     Franca Landrum MD, [facility]                    
CPT-4: 86977                    2017                

 

                     ([id number]) PREV VISIT EST AGE 18-39

Diagnosis: Encounter for general adult medical examination without abnormal 
findings[ICD10: Z00.00]                                      Meghan Landrum MD, [facility]                    CPT-4: 88757         
           06/15/2016                

 

                     ([id number]) PREV VISIT EST AGE 18-39

Diagnosis: PREVENTIVE PHYSICAL EXAM[ICD9: V70.0]                               
       Meghan Landrum MD, [facility]                
    CPT-4: 89540                    2015                

 

                     [id number]) 55516 EST. PATIENT, LEVEL III

Diagnosis: ACHILLES TENDINITIS[ICD9: 726.71]                                   
   Meghan Landrum MD, [facility]                    
CPT-4: 59034                    2013                

 

                     [id number]) 04488 EST. PATIENT, LEVEL III

Diagnosis: Rash[ICD9: 782.1]

Diagnosis: PRURITIC DISORDER[ICD9: 698.9]                                      
Klarissa Landrum MD, [facility]                    
CPT-4: 32468                    2013                

 

                     [id number]) 29139 EST. PATIENT, LEVEL III

Diagnosis: ACUTE URI[ICD9: 465.9]

Diagnosis: Rash[ICD9: 782.1]                                      Klarissa Landrum MD, [facility]                    CPT-4: 
03674                    2012                

 

                     [id number]) 78394 EST. PATIENT, LEVEL III

Diagnosis: PRURITIC DISORDER[ICD9: 698.9]

Diagnosis: NONSPECIF SKIN ERUPT NEC[ICD9: 782.1]                               
       Meghan Landrum MD, [facility]                
    CPT-4: 80967                    2012                

 

                     58481 EST. PATIENT, LEVEL IV

Diagnosis: Rash[ICD9: 782.1]

Diagnosis: Localized pruritus[ICD9: 698.9]

Diagnosis: Elevated blood pressure[ICD9: 796.2]                                
      Meghan Landrum MD, [facility]                 
   CPT-4: 17948                    2012                



                                                                               
                                                                               
                                                                                



Plan of Care

                      





 Planned Activity                    Notes                    Codes            
        Status                    Date                

 

 Visit Plan:                     Malaise, weight loss - labs OK - pt is to 
follow with his RA specialist - will start on PPI, if symptoms do not improve 
will consider CT or referral to GI specialist Esophageal Reflux - the patient 
has been counseled against excessive intake of caffeine, spicy foods, peppermint
, and cinnamon - all of which can exacerbate esophageal reflux. The patient is 
to take medications as prescribed and call the office if the symptoms are not 
improving.

                                                             2018        
        

 

 Appointment: Franca Casper 

WPtel:[phone]

 [address] [facility] [facility]                                                             (30 min) Complex
                    2018                

 

 Patient Education: Patient Medication Summary                                 
                            Completed                    2018            
    

 

 Visit Plan:                     BPPV - Benign Paroxysmal Positional Vertigo - 
discussed diagnosis with the patient, offered the pt the appropriate additional 
information in hand-out. Pt instructed in home exercises to help alleviate and 
prevent future recurrent episodes of vertigo. Pt informed that if symptoms 
worsen, call the office for further instructions/medication interventions. RA - 
Pt is to follow with specialist at  - pt is to notify clinic of any changes 
in current treatment plan or with any acute questions or concerns. Weight loss 
- will check labs and treat as indicated.

                                                             2018        
        

 

 Appointment: Franca Casper 

WPtel:[phone]

 [address] [facility]                                                             (15 min) 
Moderate                    2018                

 

 Patient Education: Patient Medication Summary                                 
                            Completed                    2018            
    

 

 Visit Plan:                     RA - acute flare - will send RX - pt has an 
appointment with a new rheumatologist in January. Pt is to notify clinic if 
symptoms do not improve, if they worsen, or with any acute changes, questions, 
or concerns.

                                                             2017        
        

 

 Visit Plan:                     RA - acute flare - will send RX - pt has an 
appointment with a new rheumatologist in January. Pt is to notify clinic if 
symptoms do not improve, if they worsen, or with any acute changes, questions, 
or concerns.

                                                             2017        
        

 

 Appointment: Franca Casper 

WPtel:[phone]

 [facility]                                                             (30 min) Complex
                    2017                

 

 Patient Education: Patient Medication Summary                                 
                            Completed                    2017            
    

 

 Visit Plan:                     Bronchitis - acute case of bronchitis 
identified. Pt has been given antibiotics, steroid as appropriate, and pt has 
been instructed to call if symptoms are not improved, or if symptoms acutely 
worsen. Allergies - chronic - recommended pt to use allergy medication as 
prescribed. Pt has been counseled as to the appropriate use of the medication. 
Pt to call if allergy symptoms are not controlled with the medication. If using 
nasal spray, instructions as follows: Nasal spray- use twice daily, one spray 
per nostril twice daily, after 30 minutes, rinse out nose with saline spray.. 
Use opposite hand per nostril to spray in the nasal steroid allergy spray.

                                                             05/15/2017        
        

 

 Visit Plan:                     Bronchitis - acute case of bronchitis 
identified. Pt has been given antibiotics, steroid as appropriate, and pt has 
been instructed to call if symptoms are not improved, or if symptoms acutely 
worsen. Allergies - chronic - recommended pt to use allergy medication as 
prescribed. Pt has been counseled as to the appropriate use of the medication. 
Pt to call if allergy symptoms are not controlled with the medication. If using 
nasal spray, instructions as follows: Nasal spray- use twice daily, one spray 
per nostril twice daily, after 30 minutes, rinse out nose with saline spray.. 
Use opposite hand per nostril to spray in the nasal steroid allergy spray.

                                                             05/15/2017        
        

 

 Appointment: Franca Casper 

WPtel:[phone]

 [facility]                                                             (15 min) 
Moderate                    05/15/2017                

 

 Patient Education: Patient Medication Summary                                 
                            Completed                    05/15/2017            
    

 

 Visit Plan:                     BPPV - Benign Paroxysmal Positional Vertigo - 
discussed diagnosis with the patient, offered the pt the appropriate additional 
information in hand-out. Pt instructed in home exercises to help alleviate and 
prevent future recurrent episodes of vertigo. Pt informed that if symptoms 
worsen, call the office for further instructions/medication interventions.

                                                             2017        
        

 

 Appointment: Klarissa Roberts 

WPtel:[phone]

 [facility] [facility]

                                                             (30 min) Complex
                    2017                

 

 Patient Education: Patient Medication Summary                                 
                            Completed                    2017            
    

 

 Patient Education: .Amazing charts Paroxysmal positional vertigo              
                                               Completed                    2017                

 

 Patient Education: Obesity                                                    
         Completed                    2017                

 

 Visit Plan:                     Right wrist pain - The pt is to use prn 
antiinflammatories to manage acute pain. The patient is to call the office if 
the pain is worsening or does not improve. Intermittent and varying joint pain 
- ongoing for the past 5-6 years - will check labs

                                                             2017        
        

 

 Appointment: Franca Casper 

WPtel:[phone] [address] [facility]

                                                             (10 min) Simple 
                   2017                

 

 Patient Education: Patient Medication Summary                                 
                            Completed                    2017            
    

 

 Visit Plan:                     Well Adult - pt was counseled about diet, 
exercise, and encouraged to follow a heart healthy diet and increase activity 
level. The patient was instructed to RTC yearly for well adult exams and PRN 
for acute illnesses. The pt was also instructed to have yearly labs for check 
of cholesterol, thyroid, chem panel, CBC, and renal functioning.

                                                             06/15/2016        
        

 

 Appointment: Meghan Landrum 

WPtel:[phone]

 [facility]

                                                             (15 min) 
Moderate                    06/15/2016                

 

 Patient Education: Patient Medication Summary                                 
                            Completed                    06/15/2016            
    

 

 Patient Education: Obesity                                                    
         Completed                    06/15/2016                

 

 Visit Plan:                     Well Adult - pt was counseled about diet, 
exercise, and encouraged to follow a heart healthy diet and increase activity 
level. The patient was instructed to RTC yearly for well adult exams and PRN 
for acute illnesses. The pt was also instructed to have yearly labs for check 
of cholesterol, thyroid, chem panel, CBC, and renal functioning.

                                                             2015        
        

 

 Appointment: Meghan Landrum 

WPtel:[phone]

 [facility]

                                                             (15 min) 
Moderate                    2015                

 

 Patient Education: Patient Medication Summary                                 
                            Completed                    2015            
    

 

 Visit Plan:                     Achilles tendonitis - uncontrolled symptoms- 
recommend pt to take antiinflammatory as directed for pain control. Use tylenol 
for break through pain symptoms.

                                                             2013        
        

 

 Appointment: Meghan Landrum 

WPtel:[phone]

 [facility]

                                                             Follow up       
             2013                

 

 Patient Education: Patient Medication Summary                                 
                            Completed                    2013            
    

 

 Patient Education: Achilles Tendon Injury Exercises                           
                                  Completed                    2013      
          

 

 Patient Education: Achilles Tendon Injury Exercises: Illustration             
                                                Completed                                    

 

 Visit Plan:                     Rash-chronic--plan to treat with short course 
of prednisone and monitor symptoms. Patient is to call if rash does not resolve 
completely or if any worse. Finish anti-fungal medication as well. Patient 
verbalized understanding.

                                                             2013        
        

 

 Appointment: Klarissa Roberts 

WPtel:[phone]

 [facility]

[place]                                                             Other           
         2013                

 

 Patient Education: Patient Medication Summary                                 
                            Completed                    2013            
    

 

 Visit Plan:                     Rash-culture done today in the office-plan to 
treat with short course of prednisone and monitor symptoms. Patient is to call 
if rash does not resolve completely or if any worse. Finish anti-fungal 
medication as well. Patient verbalized understanding. URI - Pt advised to 
increase fluids, vitamin C. Discussed natural and expected course of this 
diagnosis and need to alert me if symtpoms do not follow expected course, or if 
any worse. RX sent to patient's pharmacy.Rocephin and kenalog injection today 
in the office.

                                                             2012        
        

 

 Appointment: Klarissa Roberts 

WPtel:[phone]

 [address] [id number] [place] [address]                                                             Other           
         2012                

 

 Patient Education: Patient Medication Summary                                 
                            Completed                    2012            
    

 

 Visit Plan:                     Rash - Itching - uncertain eitiology - but 
with his response to the steroid and antifungal and the diffuse look of a 
folliculitis - I will treat Jose with a different antifungal and steroid and 
follow his progress. itraconazole 100 mg x 20 days prednisone taper

                                                             2012        
        

 

 Appointment: Meghan Landrum 

WPtel:[phone]

 [address] [facility]

                                                             Other           
         2012                

 

 Patient Education: Patient Medication Summary                                 
                            Completed                    2012            
    

 

 Visit Plan:                     Rash -uncertain eitiology - unable to get 
sample for reliable culture- will empirically treat with prednisone taper and 
diflucan x 7 days. Pt is to call if the rash does not improve or if it worsens. 
Elevated blood pressure - check blood pressure at home, cut back on salty foods
, call in blood pressure in one week.

                                                             2012        
        

 

 Appointment: Meghan Landrum 

WPtel:[phone]

 [address] [facility]

                                                             Other           
         2012                

 

 Patient Education: Patient Medication Summary                                 
                            Completed                    2012            
    



                                                                               
                                                                               
                                                                               
                                                                               
                                                                               
             



Instructions

                      





 Comment                

 

                     . Rash -uncertain eitiology - unable to get sample for 
reliable culture- will empirically treat with prednisone taper and diflucan x 7 
days.

Pt is to call if the rash does not improve or if it worsens.



Elevated blood pressure - check blood pressure at home, cut back on salty foods
, call in blood pressure in one week.                                  

 

                     . RA - acute flare - will send RX - pt has an appointment 
with a new rheumatologist in January.  Pt is to notify clinic if symptoms do 
not improve, if they worsen, or with any acute changes, questions, or concerns.
                                   

 

                     . RA - acute flare - will send RX - pt has an appointment 
with a new rheumatologist in January.  Pt is to notify clinic if symptoms do 
not improve, if they worsen, or with any acute changes, questions, or concerns.
                                   

 

                     TREAT WITH PENNSAID 10 TO 15 DROPS FOUR TIMES DAILY X 2 
WEEKS.

 . Achilles tendonitis -  uncontrolled symptoms- recommend pt to take 
antiinflammatory as directed for pain control.



Use tylenol for break through pain symptoms.                                  

 

                     . Rash - Itching - uncertain eitiology - but with his 
response to the steroid and antifungal and the diffuse look of a folliculitis - 
I will treat Jose with a different antifungal and steroid and follow his 
progress.



itraconazole 100 mg x 20 days

prednisone taper                                  

 

                     . Malaise, weight loss - labs OK - pt is to follow with 
his RA specialist - will start on PPI, if symptoms do not improve will consider 
CT or referral to GI specialist



Esophageal Reflux - the patient has been counseled against excessive intake of 
caffeine, spicy foods, peppermint, and cinnamon - all of which can exacerbate 
esophageal reflux.

The patient is to take medications as prescribed and call the office if the 
symptoms are not improving.

                                  

 

                     . Rash-culture done today in the office-plan to treat with 
short course of prednisone and monitor symptoms. Patient is to call if rash 
does not resolve completely or if any worse. Finish anti-fungal medication as 
well. Patient verbalized understanding. 



 URI - Pt advised to increase fluids, vitamin C.  Discussed natural and 
expected course of this diagnosis and need to alert me if symtpoms do not 
follow expected course, or if any worse. RX sent to patient's pharmacy.Rocephin 
and kenalog injection today in the office.                                   

 

                     . BPPV - Benign Paroxysmal Positional Vertigo - discussed 
diagnosis with the patient, offered the pt the appropriate additional 
information in hand-out.  Pt instructed in home exercises to help alleviate and 
prevent future recurrent episodes of vertigo.  Pt informed that if symptoms 
worsen, call the office for further instructions/medication interventions.



RA - Pt is to follow with specialist at  - pt is to notify clinic of any 
changes in current treatment plan or with any acute questions or concerns. 



Weight loss - will check labs and treat as indicated.                          
        

 

                     . Well Adult - pt was counseled about diet, exercise, and 
encouraged to follow a heart healthy diet and increase activity level.  The 
patient was instructed to RTC yearly for well adult exams and PRN for acute 
illnesses.  The pt was also instructed to have yearly labs for check of 
cholesterol, thyroid, chem panel, CBC, and renal functioning.

                                  

 

                     . BPPV - Benign Paroxysmal Positional Vertigo - discussed 
diagnosis with the patient, offered the pt the appropriate additional 
information in hand-out.  Pt instructed in home exercises to help alleviate and 
prevent future recurrent episodes of vertigo.  Pt informed that if symptoms 
worsen, call the office for further instructions/medication interventions.

                                  

 

                     . Right wrist pain - The pt is to use prn 
antiinflammatories to manage acute pain. The patient is to call the office if 
the pain is worsening or does not improve. 



Intermittent and varying joint pain - ongoing for the past 5-6 years - will 
check labs                                  

 

                     steroid shot today 



prednisone 40mg daily x 2 more days, then 20mg daily x 2 days, then stop - let 
me know if your symptoms are not improving



Continue doxycycline for now - will check chest x-ray - if needed will change 
antibiotics. 



[facility] allergy medicine - I will send as a prescription, if it is cheaper over 
the counter get the over the counter (they are the same medicine and dose)



Let me know if you are not getting better.. Bronchitis - acute case of 
bronchitis identified.  Pt has been given antibiotics, steroid as appropriate, 
and pt has been instructed to call if symptoms are not improved, or if symptoms 
acutely worsen.



Allergies - chronic - recommended pt to use allergy medication as prescribed.  
Pt has been counseled as  to the appropriate use of the medication.  Pt to call 
if allergy symptoms are not controlled with the medication.

If using nasal spray, instructions as follows: Nasal spray- use twice daily, 
one spray per nostril twice daily, after 30 minutes, rinse out nose with saline 
spray.. Use opposite hand per nostril to spray in the nasal steroid allergy 
spray.

                                  

 

                     steroid shot today 



prednisone 40mg daily x 2 more days, then 20mg daily x 2 days, then stop - let 
me know if your symptoms are not improving



Continue doxycycline for now - will check chest x-ray - if needed will change 
antibiotics. 



[facility] allergy medicine - I will send as a prescription, if it is cheaper over 
the counter get the over the counter (they are the same medicine and dose)



Let me know if you are not getting better.. Bronchitis - acute case of 
bronchitis identified.  Pt has been given antibiotics, steroid as appropriate, 
and pt has been instructed to call if symptoms are not improved, or if symptoms 
acutely worsen.



Allergies - chronic - recommended pt to use allergy medication as prescribed.  
Pt has been counseled as  to the appropriate use of the medication.  Pt to call 
if allergy symptoms are not controlled with the medication.

If using nasal spray, instructions as follows: Nasal spray- use twice daily, 
one spray per nostril twice daily, after 30 minutes, rinse out nose with saline 
spray.. Use opposite hand per nostril to spray in the nasal steroid allergy 
spray.

                                  

 

                     . Rash-chronic--plan to treat with short course of 
prednisone and monitor symptoms. Patient is to call if rash does not resolve 
completely or if any worse. Finish anti-fungal medication as well. Patient 
verbalized understanding. 



                                  

 

                     retinol or cortisone on the dark skin lesion on back - 

the site on the back of your left calf is called a dermatofibroma is and benign.

 . Well Adult - pt was counseled about diet, exercise, and encouraged to follow 
a heart healthy diet and increase activity level.  The patient was instructed 
to RTC yearly for well adult exams and PRN for acute illnesses.  The pt was 
also instructed to have yearly labs for check of cholesterol, thyroid, chem 
panel, CBC, and renal functioning.

## 2018-03-05 NOTE — CONSULTATION
History of Present Illness


History of Present Illness


Patient Consulted On(narciso/time)


3/5/18


 20:44


Date Seen by Provider:  Mar 5, 2018


Time Seen by Provider:  20:44


History of Present Illness


Consult requested by Dr. Landrum for epigastric abdominal pain and nausea.


Patient is a 37-year-old male whose had approximately 8 weeks of abdominal pain 

and nausea.  He's had workup of his gallbladder ultrasound which was normal.  

Patient was seen in emergency department by myself over last weekend and was 

sent home to be on Protonix and Carafate and was planning on EGD on Tuesday.  

Patient states that since then he has his continued to worsen.  Having extreme 

nausea.  His pain is in the epigastric area with moderate intensity.  No 

radiation of pain.  Nothing makes it better and is unsure if he is making it 

worse.  Patient also having some diarrhea and not really getting much 

nutrition.  Patient is getting more concerned because this is not his normal 

self until he may be getting a little bit depressed as well.  Patient was seen 

by Dr. Landrum today in the office who admitted him for observation.  Patient 

denies any fever sweats chills shortness of breath or chest pain.  Patient has 

noted weight loss.





Allergies and Home Medications


Allergies


Coded Allergies:  


     No Known Drug Allergies (Unverified , 3/5/18)





Home Medications


Sucralfate 1 Gm Tablet, 1 GM PO QID, (Reported)





Patient Home Medication List


Home Medication List Reviewed:  Yes





Past Medical-Social-Family Hx


Patient Social History


Alcohol Use:  Denies Use


Recreational Drug Use:  No


Drug of Choice:  THC


Former Smoker, Quit:  Feb 5, 2018


Type Used:  Cigarettes


Recent Foreign Travel:  No


Contact w/Someone Who Travel:  No


Recent Infectious Disease Expo:  No


Recent Hopitalizations:  No


Physical Abuse Screen:  No


Sexual Abuse:  No





Seasonal Allergies


Seasonal Allergies:  No





Surgeries


History of Surgeries:  Yes (HERNIA AS BABY)





Respiratory


History of Respiratory Disorde:  No





Cardiovascular


History of Cardiac Disorders:  No





Neurological


History of Neurological Disord:  No





Reproductive System


Sexually Transmitted Disease:  No


HIV/AIDS:  No





Genitourinary


History of Genitourinary Disor:  No





Gastrointestinal


History of Gastrointestinal Di:  Yes


Gastrointestinal Disorders:  Gastroesophageal Reflux, Chronic Diarrhea





Musculoskeletal


History of Musculoskeletal Dis:  Yes


Musculoskeletal Disorders:  Rheumatoid Arthritis





Endocrine


History of Endocrine Disorders:  No





HEENT


History of HEENT Disorders:  No


Loss of Vision:  Bilateral





Cancer


History of Cancer:  No





Psychosocial


History of Psychiatric Problem:  Yes


Behavioral Health Disorders:  Depression





Integumentary


History of Skin or Integumenta:  Yes


Skin/Integumentary Disorders:  Eczema





Blood Transfusions


History of Blood Disorders:  No


Adverse Reaction to a Blood Tr:  No





Family Medical History


Significant Family History:  No Pertinent Family Hx


Family Medial History:  





Review of Systems-General


Constitutional:  see HPI, weight loss


EENTM:  no symptoms reported


Respiratory:  no symptoms reported


Cardiovascular:  no symptoms reported


Gastrointestinal:  see HPI


Genitourinary:  no symptoms reported


Musculoskeletal:  no symptoms reported


Skin:  no symptoms reported


Psychiatric/Neurological:  See HPI





Physical Exam-General Problems


Physical Exam


Vital Signs





Vital Signs - First Documented








 3/5/18





 16:08


 


Temp 98.9


 


Pulse 69


 


Resp 18


 


B/P (MAP) 138/86 (103)


 


Pulse Ox 100


 


O2 Delivery Room Air





Capillary Refill :


General Appearance:  no apparent distress (Laying in bed appears fatigued)


HEENT:  PERRL/EOMI, normal ENT inspection


Neck:  non-tender, full range of motion, supple


Respiratory:  no respiratory distress, no accessory muscle use


Cardiovascular:  regular rate, rhythm


Gastrointestinal:  soft, tenderness (Epigastric region)


Rectal:  deferred


Back:  normal inspection, no CVA tenderness


Extremities:  non-tender, normal inspection


Neurologic/Psychiatric:  CNs II-XII nml as tested, no motor/sensory deficits, 

alert, oriented x 3, other (Flat affect)


Skin:  normal color, warm/dry


Lymphatic:  no adenopathy





Data Review


Labs


Laboratory Tests


3/5/18 16:40: 


Sodium Level 138, Potassium Level 3.6, Chloride Level 107, Carbon Dioxide Level 

22, Anion Gap 9, Blood Urea Nitrogen 13, Creatinine 0.80, Estimat Glomerular 

Filtration Rate > 60, BUN/Creatinine Ratio 16, Glucose Level 93, Calcium Level 

9.9, Total Bilirubin 0.4, Aspartate Amino Transf (AST/SGOT) 15, Alanine 

Aminotransferase (ALT/SGPT) 19, Alkaline Phosphatase 51, Total Protein 7.6, 

Albumin 4.6H





Assessment/Plan


Patient is 37-year-old male with epigastric abdominal pain, nausea and vomiting

, weight loss and diarrhea.


Patient getting IV fluids


Nausea medications to control nausea


Discuss with Dr. Landrum and JUAN A scan scheduled for in the morning to further 

evaluate the gallbladder.  We'll also plan on EGD tomorrow.


Risk and benefits of EGD were discussed with the patient and his family who 

wished to proceed with plan.  All questions were answered.





Clinical Quality Measures


DVT/VTE Risk/Contraindication:


RFS Level Per Nursing on Admit:  1=Low/No VTE PPX











DARRELL MENDES DO Mar 5, 2018 20:50

## 2018-03-05 NOTE — XMS REPORT
CCD document using C-CDA

 Created on: 2018



Jose Jones

External Reference #: [id number]

: 1980

Sex: Male



Demographics







 Address  [address]  [address]

 

 Home Phone  [phone]

 

 Preferred Language  English

 

 Marital Status  

 

 Shinto Affiliation  Unknown

 

 Race  Other Race

 

 Ethnic Group  Not  or 





Author







 Author  Meghan Landrum

 

 Organization  Meghan Landrum MD, [facility]

 

 Address  [address] [place]  [address]



 

 Phone  [phone]







Care Team Providers







 Care Team Member Name  Role  Phone

 

  PP  Unavailable

 

  CCM  Unavailable



                                            



Summary Purpose

          Interface Exchange                                                   
                 



Insurance Providers

                      





 Payer name                    Policy type / Coverage type                    
Covered party ID                    Effective Begin Date                    
Effective End Date                

 

 [facility]/[employer]    
                [id number]                    2017                    
Unknown                



                                                                              



Family history

                      



Mother            





 Diagnosis                    Age At Onset                

 

 No Family Disease Entered                    N/A                



            



Sister            





 Diagnosis                    Age At Onset                

 

 No Family Disease Entered                    N/A                



            



Brother            





 Diagnosis                    Age At Onset                

 

 No Family Disease Entered                    N/A                



            



Daughter            





 Diagnosis                    Age At Onset                

 

 No Family Disease Entered                    N/A                



            



Brother            





 Diagnosis                    Age At Onset                

 

 No Family Disease Entered                    N/A                



            



Brother            





 Diagnosis                    Age At Onset                

 

 No Family Disease Entered                    N/A                



            



Brother            





 Diagnosis                    Age At Onset                

 

 No Family Disease Entered                    N/A                



            



Father            





 Diagnosis                    Age At Onset                

 

 No Family Disease Entered                    N/A                



                                                                               
                                                                     



Social History

                      





 Social History Element                    Codes                    Description
                    Effective Dates                

 

 Number of children                    Unknown                    1            
        2013                

 

 Marital status                    Unknown                              
          2012                

 

 Living arrangements                    Unknown                    House       
             2012                

 

 Employment                    Unknown                    Currently employed

[employer]                    2012                



                                                                               
                                       



Allergies, Adverse Reactions, Alerts

          Allergies, Adverse Reactions, Alerts data not found                  
                                                  



Past Medical History

                      





 Illness                    Codes                    Condition Status          
          Onset Date                    Resolved Date                

 

                     Abnormal weight loss                                      
ICD-9: 783.21

ICD-10: R63.4                    Active                    2018          
          Unknown                

 

                     Gastro-esophageal reflux disease without esophagitis      
                                ICD-9: 530.81

ICD-10: K21.9                    Active                    2018          
          Unknown                

 

                     Other malaise                                      ICD-9: 
780.79

ICD-10: R53.81                    Active                    2018         
           Unknown                

 

                     Benign paroxysmal vertigo, bilateral                      
                ICD-9: 386.11

ICD-10: H81.13                    Active                    2017         
           Unknown                

 

                     Other fatigue                                      ICD-9: 
780.79

ICD-10: R53.83                    Active                    2018         
           Unknown                

 

                     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 
without organ or systems involvement                                      ICD-9
: 714.0

ICD-10: M05.732                    Active                    2017        
            Unknown                

 

                     Acute bronchitis due to other specified organisms         
                             ICD-9: 466.0

ICD-10: J20.8                    Active                    05/15/2017          
          Unknown                

 

                     Other allergic rhinitis                                   
   ICD-9: 477.8

ICD-10: J30.89                    Active                    05/15/2017         
           Unknown                

 

                     Pain in right wrist                                      
ICD-9: 719.43

ICD-10: M25.531                    Active                    2017        
            Unknown                

 

                     Encounter for general adult medical examination without 
abnormal findings                                      ICD-9: V70.0

ICD-10: Z00.00                    Active                    2015         
           Unknown                

 

                     PREVENTIVE PHYSICAL EXAM                                  
    ICD-9: V70.0                    Active                    2015       
             Unknown                

 

                     ACHILLES TENDINITIS                                      
ICD-9: 726.71                    Active                    2013          
          Unknown                

 

                     ACUTE URI                                      ICD-9: 
465.9                    Active                    2012                  
  Unknown                

 

                     Elevated blood pressure                                   
   ICD-9: 796.2                    Active                    2012        
            Unknown                

 

                     Localized pruritus                                      ICD
-9: 698.9                    Active                    2012              
      Unknown                

 

                     Rash                                      ICD-9: 782.1    
                Active                    2012                    Unknown
                



                                                                               
                                                                               
                                                                                



Problems

                      





 Condition                    Codes                    Effective Dates         
           Condition Status                

 

                     Abnormal weight loss                                      
ICD-9: 783.21

ICD-10: R63.4                    2018                    Active          
      

 

                     Gastro-esophageal reflux disease without esophagitis      
                                ICD-9: 530.81

ICD-10: K21.9                    2018                    Active          
      

 

                     Other malaise                                      ICD-9: 
780.79

ICD-10: R53.81                    2018                    Active         
       

 

                     Benign paroxysmal vertigo, bilateral                      
                ICD-9: 386.11

ICD-10: H81.13                    2017                    Active         
       

 

                     Other fatigue                                      ICD-9: 
780.79

ICD-10: R53.83                    2018                    Active         
       

 

                     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 
without organ or systems involvement                                      ICD-9
: 714.0

ICD-10: M05.732                    2017                    Active        
        

 

                     Acute bronchitis due to other specified organisms         
                             ICD-9: 466.0

ICD-10: J20.8                    05/15/2017                    Active          
      

 

                     Other allergic rhinitis                                   
   ICD-9: 477.8

ICD-10: J30.89                    05/15/2017                    Active         
       

 

                     Pain in right wrist                                      
ICD-9: 719.43

ICD-10: M25.531                    2017                    Active        
        

 

                     Encounter for general adult medical examination without 
abnormal findings                                      ICD-9: V70.0

ICD-10: Z00.00                    2015                    Active         
       

 

                     PREVENTIVE PHYSICAL EXAM                                  
    ICD-9: V70.0                    2015                    Active       
         

 

                     ACHILLES TENDINITIS                                      
ICD-9: 726.71                    2013                    Active          
      

 

                     ACUTE URI                                      ICD-9: 
465.9                    2012                    Active                

 

                     Elevated blood pressure                                   
   ICD-9: 796.2                    2012                    Active        
        

 

                     Localized pruritus                                      ICD
-9: 698.9                    2012                    Active              
  

 

                     Rash                                      ICD-9: 782.1    
                2012                    Active                



                                                                               
                                                                               
                                                                                



Medications

                      





 Medication                    Codes                    Instructions           
         Start Date                    Stop Date                    Status     
               Fill Instructions                

 

                     Carafate 1 gram tablet                                    
  RxNorm: 025261                    1 Tablet(s) PO AC & HS                    2018                    Active               
                     

 

                     Carafate 1 gram tablet                                    
  RxNorm: 955936                    1 Tablet(s) PO AC & HS                    2018                    Inactive             
                       

 

                     Prilosec OTC 20 mg tablet,delayed release                 
                     RxNorm: 402246                    1 Tablet(s) PO daily    
                2018                    No Stop Date                    
Active                                    

 

                     meclizine 25 mg tablet                                    
  RxNorm: 269871                    1 Tablet(s) PO TID as needed               
     2018                    Inactive      
                              

 

                     Zofran ODT 4 mg disintegrating tablet                     
                 RxNorm: 280494                    1 Tablet(s) PO TID as needed
                    2018                    
Inactive                                    

 

                     clobetasol 0.05 % topical cream                           
           RxNorm: 606868                    APPLY TO AFFECTED AREAS TOP as 
needed FOR ECZEMA                    2017                    No Stop Date
                    Active                                    

 

                     Voltaren 1 % topical gel                                  
    RxNorm: 834045                    1 Application TOP QID as needed          
          2017                    No Stop Date                    Active 
                                   

 

                     prednisone 10 mg tablet                                   
   RxNorm: 088216                    Tablet(s) PO UD                    2018                    Inactive                 
   6,5,4,3,2,1                

 

                     Kenalog 40 mg/mL suspension for injection                 
                     RxNorm: 2526465                    Milliliter(s) Inj      
              2017                    
Inactive                                    

 

                     Zyrtec 10 mg tablet                                      
RxNorm: 7905356                    1 Tablet(s) PO daily                    05/15
/2017                    2017                    Inactive                
                    

 

                     Kenalog 40 mg/mL suspension for injection                 
                     RxNorm: 9144450                    1 Milliliter(s) Inj    
                05/15/2017                    05/15/2017                    
Inactive                                    

 

                     prednisone 20 mg tablet                                   
   RxNorm: 008801                    1 Tablet(s) PO daily                    05/
15/2017                    2018                    Inactive              
                      

 

                     clobetasol 0.05 % topical cream                           
           RxNorm: 556699                    APPLY TO AFFECTED AREAS TOP as 
needed FOR ECZEMA                    2016  
                  Inactive                                    

 

                     prednisone 10 mg tablets in a dose pack                   
                   RxNorm: 135936                    Tablet(s) PO              
      2013                    Inactive     
                               

 

                     Diflucan 150 mg tablet                                    
  RxNorm: 663156                    1 Tablet(s) PO daily                    2013                    Inactive              
                      

 

                     nystatin-triamcinolone 100,000 unit/gram-0.1 % Ointment   
                                   RxNorm: 7625380                    1 
Application TOP BID                    2012
                    Inactive                                    

 

                     nystatin-triamcinolone 100,000 unit/gram-0.1 % Ointment   
                                   RxNorm: 7978282                    1 
Application TOP BID                    2012
                    Inactive                                    

 

                     Bactrim  mg-160 mg tablet                           
           RxNorm: 352214                    1 Tablet(s) PO BID                
    2012                    Inactive       
                             

 

                     prednisone 10 mg tablets in a dose pack                   
                   RxNorm: 206482                    Tablet(s) PO UD 6-5-4-3-2-
1                    2012                  
  Inactive                                    

 

                     itraconazole 100 mg Cap                                   
   RxNorm: 289510                    1 Capsule(s) PO                    2013                    Inactive                 
                   

 

                     Kenalog 40 mg/mL Susp for Injection                       
               RxNorm: 4233348                    Milliliter(s) Inj            
        2012                    Inactive   
                                 

 

                     Kenalog 40 mg/mL Susp for Injection                       
               RxNorm: 6704408                    2 Milliliter(s) Inj          
          2012                    Inactive 
                                   

 

                     meloxicam 15 mg tablet                                    
  RxNorm: 375492                    1 Tablet(s) PO daily                    No 
Start Date                                         Active                      
              

 

                     itraconazole 100 mg Cap                                   
   RxNorm: 773204                    1 Capsule(s) PO                    No 
Start Date                    2012                    Inactive           
                         

 

                     clobetasol 0.05 % topical cream                           
           RxNorm: 840760                    APPLY TO AFFECTED AREAS TOP as 
needed FOR ECZEMA                    No Start Date                    02/10/
2016                    Inactive                                    



                                                                               
                                                                               
                                                                               
                                                                               
  



Medication Administered

                      





 Medication                    Codes                    Instructions           
         Start Date                    Status                

 

 Kenalog 40 mg/mL suspension for injection                    RxNorm: 3236661  
                  Milliliter                    2017                    
No longer Active                

 

 Kenalog 40 mg/mL suspension for injection                    RxNorm: 6334938  
                  1Milliliter                    05/15/2017                    
No longer Active                

 

 Kenalog 40 mg/mL Susp for Injection                    RxNorm: 6472371        
            2Milliliter                    2012                    No 
longer Active                



                                                                               
                   



Immunizations

          No Immunization data                                                 
         



Assessments

                      





 Condition                    Codes                    Effective Dates         
       

 

 Abnormal weight loss                    ICD-10: R63.4

ICD-9: 783.21                    2018                

 

 Other malaise                    ICD-10: R53.81

ICD-9: 780.79                    2018                

 

 Gastro-esophageal reflux disease without esophagitis                    ICD-10
: K21.9

ICD-9: 530.81                    2018                

 

 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 
systems involvement                    ICD-10: M05.732

ICD-9: 714.0                    2018                

 

 Other fatigue                    ICD-10: R53.83

ICD-9: 780.79                    2018                

 

 Benign paroxysmal vertigo, bilateral                    ICD-10: H81.13

ICD-9: 386.11                    2018                

 

 Acute bronchitis due to other specified organisms                    ICD-10: 
J20.8

ICD-9: 466.0                    05/15/2017                

 

 Other allergic rhinitis                    ICD-10: J30.89

ICD-9: 477.8                    05/15/2017                

 

 Pain in right wrist                    ICD-10: M25.531

ICD-9: 719.43                    2017                

 

 Encounter for general adult medical examination without abnormal findings     
               ICD-10: Z00.00

ICD-9: V70.0                    06/15/2016                

 

 PREVENTIVE PHYSICAL EXAM                    ICD-9: V70.0                                    

 

 ACHILLES TENDINITIS                    ICD-9: 726.71                    2013                

 

 PRURITIC DISORDER                    ICD-9: 698.9                    2013                

 

 Rash                    ICD-9: 782.1                    2013            
    

 

 ACUTE URI                    ICD-9: 465.9                    2012       
         

 

 Elevated blood pressure                    ICD-9: 796.2                                    



                                                                    



Reason For Visit

                      





 Reason For Visit                    Effective Dates                    Notes  
              

 

 nausea                    2018                                    

 

 dizziness                    2018                                    

 

 wrist pain                    2017                                    

 

 cough                    05/15/2017                                    

 

 dizziness                    2017                                    

 

 wrist pain                    2017                                    

 

 ankle pain                    06/15/2016                                    

 

 skin lesion                    2015                                    

 

 ankle pain                    2013                                    

 

 skin lesion                    2013                                    

 

 headache                    2012                                    

 

 rash                    2012                    started on patient's 
right hand and spread to the left.  he was taking diflucan and nystatin cream.  
Once that cleared, it started on both of his arms.                  

 

 flare up of rash                    2012                                
    



                                                                              



Results

                      





 Observation                    Observation Code                    Item       
             Item Code                    Result                    Date       
         

 

 Jordyn                    [id number]                    JORDYN (FLAVIO) SCREEN           
                             DETECTED                     2017           
     

 

 Jordyn                    [id number]                    JORDYN (IFA) TITER              
                          1:160                     2017                

 

 Jordyn                    [id number]                    JORDYN PATTERN                  
                      SPECKLED                     2017                

 

 Jordyn                    [id number]                                                 
                               2017                

 

 Cbc With Differential                    Ord2                    WBC          
                              3.84 K/ul                    2017          
      

 

 Cbc With Differential                    Ord2                    RBC          
                              5.27 M/ul                    2017          
      

 

 Cbc With Differential                    Ord2                    HGB          
                              15.6 g/dl                    2017          
      

 

 Cbc With Differential                    Ord2                    HCT          
                              45.1 %                    2017             
   

 

 Cbc With Differential                    Ord2                    Neut%        
                                56.7 %                    2017           
     

 

 Cbc With Differential                    Ord2                    MCV          
                              85.6 fl                    2017            
    

 

 Cbc With Differential                    Ord2                    Lymph%       
                                 31.0 %                    2017          
      

 

 Cbc With Differential                    Ord2                    MCH          
                              29.6 pg                    2017            
    

 

 Cbc With Differential                    Ord2                    Mono%        
                                10.4 %                    2017           
     

 

 Cbc With Differential                    Ord2                    MCHC         
                               34.6 pg                    2017           
     

 

 Cbc With Differential                    Ord2                    Eos%         
                               1.6 %                    2017             
   

 

 Cbc With Differential                    Ord2                    Baso%        
                                0.3 %                    2017            
    

 

 Cbc With Differential                    Ord2                    PLT          
                              218 K/ul                    2017           
     

 

 Cbc With Differential                    Ord2                    Neut ABS#    
                                    2.18 K/ul                    2017    
            

 

 Cbc With Differential                    Ord2                    RDW          
                              14.9 %                    2017             
   

 

 Cbc With Differential                    Ord2                    Lymph ABS#   
                                     1.19 K/ul                    2017   
             

 

 Cbc With Differential                    Ord2                    Mono ABS#    
                                    0.4 K/ul                    2017     
           

 

 Cbc With Differential                    Ord2                    Eos ABS#     
                                   0.1 K/ul                    2017      
          

 

 Cbc With Differential                    Ord2                    Baso ABS#    
                                    0.0 K/ul                    2017     
           

 

 C-Reactive Protein  Qnt                    Crqnt                    CRP       
                                 0.7 mg/dl                    2017       
         

 

 Sed Rate                    Ord21                    ESR                      
                  4 mm/hr                    2017                

 

 Ra Factor                    [id number]                    RA FACTOR              
                          21.4 IU/ml                    2017             
   

 

 Uric Acid                    Ord77                    Uric A                  
                      6.2 mg/dL                    2017                

 

 Lipid                    Ord30                    CHOL                        
                193 mg/dL                    2016                

 

 Lipid                    Ord30                    HDL                         
               38.0 mg/dl                    2016                

 

 Lipid                    Ord30                    TRIG                        
                187 mg/dL                    2016                

 

 Lipid                    Ord30                    LDL                         
               118 mg/dL                    2016                

 

 Lipid                    Ord30                    C/HDL                       
                 5.1 Ratio                    2016                

 

 Tsh                    Ord6                    hTSH II                        
                1.78 uIU/mL                    2016                

 

 Comp Metabolic                    Vla882                    NA                
                        135 mEq/L                    2016                

 

 Comp Metabolic                    Gne954                    K                 
                       4.3 mEq/L                    2016                

 

 Comp Metabolic                    Xcm271                    CL                
                        102 mEq/L                    2016                

 

 Comp Metabolic                    Thq458                    CO2               
                         30.0 mEq/L                    2016              
  

 

 Comp Metabolic                    Das283                    ANION GAP         
                               7                     2016                

 

 Comp Metabolic                    Oen449                    GLUCOSE           
                             105 mg/dL                    2016           
     

 

 Comp Metabolic                    Dig981                    Creat             
                           0.9 mg/dL                    2016             
   

 

 Comp Metabolic                    Nkf578                    eGFR              
                          102 ml/min/1.73m2                    2016      
          

 

 Comp Metabolic                    Geq462                    BUN               
                         13 mg/dL                    2016                

 

 Comp Metabolic                    Dsy657                    B/C Ratio         
                               14.4 Ratio                    2016        
        

 

 Comp Metabolic                    Nhl164                    CALCIUM           
                             9.1 mg/dL                    2016           
     

 

 Comp Metabolic                    Aza138                    ALK PHOS          
                              53 U/L                    2016             
   

 

 Comp Metabolic                    Wpz021                    AST(SGOT)         
                               18 U/L                    2016            
    

 

 Comp Metabolic                    Szu532                    ALT(SGPT)         
                               27 U/L                    2016            
    

 

 Comp Metabolic                    Jtu055                    BILI T            
                            0.5 mg/dL                    2016            
    

 

 Comp Metabolic                    Wuk614                    ALBUMIN           
                             4.4 g/dL                    2016            
    

 

 Comp Metabolic                    Bbq470                    TPRO              
                          6.9 g/dL                    2016                

 

 Comp Metabolic                    Ksb629                    GLOB              
                          2.5 g/dL                    2016                

 

 Comp Metabolic                    Crw287                    A/G Ratio         
                               1.7 Ratio                    2016         
       

 

 Comp Metabolic                    Qfh196                    Osmo              
                          271 mOsmo                    2016              
  

 

 Cbc With Differential                    Ord2                    WBC          
                              4.48 K/ul                    2016          
      

 

 Cbc With Differential                    Ord2                    RBC          
                              5.41 M/ul                    2016          
      

 

 Cbc With Differential                    Ord2                    HGB          
                              15.7 g/dl                    2016          
      

 

 Cbc With Differential                    Ord2                    HCT          
                              46.7 %                    2016             
   

 

 Cbc With Differential                    Ord2                    Neut%        
                                48.9 %                    2016           
     

 

 Cbc With Differential                    Ord2                    MCV          
                              86.3 fl                    2016            
    

 

 Cbc With Differential                    Ord2                    Lymph%       
                                 36.6 %                    2016          
      

 

 Cbc With Differential                    Ord2                    MCH          
                              29.0 pg                    2016            
    

 

 Cbc With Differential                    Ord2                    Mono%        
                                11.4 %                    2016           
     

 

 Cbc With Differential                    Ord2                    Eos%         
                               2.9 %                    2016             
   

 

 Cbc With Differential                    Ord2                    MCHC         
                               33.6 pg                    2016           
     

 

 Cbc With Differential                    Ord2                    PLT          
                              215 K/ul                    2016           
     

 

 Cbc With Differential                    Ord2                    Baso%        
                                0.2 %                    2016            
    

 

 Cbc With Differential                    Ord2                    Neut ABS#    
                                    2.19 K/ul                    2016    
            

 

 Cbc With Differential                    Ord2                    RDW          
                              15.0 %                    2016             
   

 

 Cbc With Differential                    Ord2                    Lymph ABS#   
                                     1.64 K/ul                    2016   
             

 

 Cbc With Differential                    Ord2                    Mono ABS#    
                                    0.5 K/ul                    2016     
           

 

 Cbc With Differential                    Ord2                    Eos ABS#     
                                   0.1 K/ul                    2016      
          

 

 Cbc With Differential                    Ord2                    Baso ABS#    
                                    0.0 K/ul                    2016     
           

 

 Cbc With Differential                    Ord2                    New Analyzer 
Notice                                        Please note new ref ranges 
starting 2016 due to implemntation of new five part differential hematolgy 
analyzer.                     2016                



                                                                               
                                                                               
          



Review of Systems

                      





 System                    Result                    Effective Dates           
     

 

 Constitutional                    No recent illness                    2018                

 

 Constitutional                    anorexia                    2018      
          

 

 Constitutional                    No chills                    2018     
           

 

 Constitutional                    No diaphoresis                    2018
                

 

 Constitutional                    fatigue                    2018       
         

 

 Constitutional                    malaise                    2018       
         

 

 Constitutional                    weight loss                    2018   
             

 

 Eyes                    No eye erythema                    2018         
       

 

 Ears/Nose/Throat/Neck                    No nasal discharge                    
2018                

 

 Ears/Nose/Throat/Neck                    No nasal allergies                    
2018                

 

 Ears/Nose/Throat/Neck                    No dizziness                    2018                

 

 Cardiovascular                    No chest pain/pressure                    2018                

 

 Cardiovascular                    No dyspnea                    2018    
            

 

 Respiratory                    No cough                    2018         
       

 

 Respiratory                    No chest congestion                    2018                

 

 Gastrointestinal                    anorexia                    2018    
            

 

 Gastrointestinal                    No abdominal pain                    2018                

 

 Gastrointestinal                    No diarrhea                    2018 
               

 

 Gastrointestinal                    No constipation                    2018                

 

 Gastrointestinal                    No gastroesophageal reflux                
    2018                

 

 Gastrointestinal                    No hematochezia                    2018                

 

 Gastrointestinal                    No melena                    2018   
             

 

 Gastrointestinal                    nausea                    2018      
          

 

 Gastrointestinal                    vomiting                    2018    
            

 

 Musculoskeletal                    arthralgia(s)                    2018
                

 

 Dermatologic                    No rash                    2018         
       

 

 Neurologic                    No alteration of consciousness                  
  2018                

 

 Neurologic                    No mental status change                    2018                

 

 Constitutional                    No recent illness                    2018                

 

 Constitutional                    No diaphoresis                    2018
                

 

 Constitutional                    No chills                    2018     
           

 

 Constitutional                    fatigue                    2018       
         

 

 Constitutional                    No fever                    2018      
          

 

 Constitutional                    malaise                    2018       
         

 

 Constitutional                    weight loss                    2018   
             

 

 Eyes                    No eye erythema                    2018         
       

 

 Ears/Nose/Throat/Neck                    No nasal discharge                    
2018                

 

 Ears/Nose/Throat/Neck                    No nasal allergies                    
2018                

 

 Cardiovascular                    No chest pain/pressure                                    

 

 Cardiovascular                    No dyspnea                    2018    
            

 

 Respiratory                    No cough                    2018         
       

 

 Respiratory                    No chest congestion                    2018                

 

 Gastrointestinal                    No abdominal pain                    2018                

 

 Gastrointestinal                    No constipation                    2018                

 

 Gastrointestinal                    No diarrhea                    2018 
               

 

 Gastrointestinal                    No gastroesophageal reflux                
    2018                

 

 Gastrointestinal                    No hematochezia                    2018                

 

 Gastrointestinal                    No melena                    2018   
             

 

 Gastrointestinal                    nausea                    2018      
          

 

 Gastrointestinal                    No vomiting                    2018 
               

 

 Gastrointestinal                    anorexia                    2018    
            

 

 Musculoskeletal                    No joint complaint                    2018                

 

 Musculoskeletal                    arthralgia(s)                    2018
                

 

 Dermatologic                    No rash                    2018         
       

 

 Neurologic                    No alteration of consciousness                  
  2018                

 

 Neurologic                    No mental status change                    2018                

 

 Psychiatric                    anxiety                    2018          
      

 

 Psychiatric                    depression                    2018       
         

 

 Constitutional                    No night sweats                    2018                

 

 Constitutional                    No insomnia                    2018   
             

 

 Constitutional                    No weight gain                    2018
                

 

 Eyes                    No eye discharge                    2018        
        

 

 Eyes                    No eye erythema                    2018         
       

 

 Ears/Nose/Throat/Neck                    dizziness                    2018                

 

 Respiratory                    No productive sputum                    2018                

 

 Genitourinary/Nephrology                    No dysuria                                    

 

 Constitutional                    No recent illness                    2017                

 

 Constitutional                    No chills                    2017     
           

 

 Constitutional                    No diaphoresis                    2017
                

 

 Constitutional                    No fever                    2017      
          

 

 Eyes                    No eye erythema                    2017         
       

 

 Ears/Nose/Throat/Neck                    No nasal discharge                    
2017                

 

 Ears/Nose/Throat/Neck                    No nasal allergies                    
2017                

 

 Cardiovascular                    No chest pain/pressure                                    

 

 Cardiovascular                    No dyspnea                    2017    
            

 

 Respiratory                    No cough                    2017         
       

 

 Respiratory                    No chest congestion                    2017                

 

 Gastrointestinal                    No abdominal pain                    2017                

 

 Musculoskeletal                    joint complaint                    2017                

 

 Neurologic                    No alteration of consciousness                  
  2017                

 

 Neurologic                    No mental status change                    2017                

 

 Constitutional                    recent illness                    05/15/2017
                

 

 Constitutional                    No chills                    05/15/2017     
           

 

 Constitutional                    fatigue                    05/15/2017       
         

 

 Constitutional                    No fever                    05/15/2017      
          

 

 Constitutional                    malaise                    05/15/2017       
         

 

 Eyes                    No eye discharge                    05/15/2017        
        

 

 Eyes                    No eye erythema                    05/15/2017         
       

 

 Ears/Nose/Throat/Neck                    nasal allergies                    05/
15/2017                

 

 Ears/Nose/Throat/Neck                    No nasal discharge                    
05/15/2017                

 

 Cardiovascular                    No chest pain/pressure                    05/
15/2017                

 

 Respiratory                    cough                    05/15/2017            
    

 

 Gastrointestinal                    No abdominal pain                    05/15/
2017                

 

 Gastrointestinal                    No constipation                    05/15/
2017                

 

 Gastrointestinal                    No diarrhea                    05/15/2017 
               

 

 Dermatologic                    No rash                    05/15/2017         
       

 

 Neurologic                    No alteration of consciousness                  
  05/15/2017                

 

 Ears/Nose/Throat/Neck                    dizziness                    05/15/
2017                

 

 Respiratory                    chest congestion                    05/15/2017 
               

 

 Respiratory                    dyspnea on exertion                    05/15/
2017                

 

 Respiratory                    No dyspnea                    05/15/2017       
         

 

 Respiratory                    wheezing                    05/15/2017         
       

 

 Neurologic                    No mental status change                    05/15/
2017                

 

 Constitutional                    No recent illness                    2017                

 

 Constitutional                    No anorexia                    2017   
             

 

 Constitutional                    No night sweats                    2017                

 

 Constitutional                    No chills                    2017     
           

 

 Constitutional                    No diaphoresis                    2017
                

 

 Constitutional                    No fatigue                    2017    
            

 

 Constitutional                    No fever                    2017      
          

 

 Constitutional                    No insomnia                    2017   
             

 

 Constitutional                    No malaise                    2017    
            

 

 Constitutional                    No weight loss                    2017
                

 

 Constitutional                    No weight gain                    2017
                

 

 Eyes                    No eye discharge                    2017        
        

 

 Eyes                    No eye erythema                    2017         
       

 

 Ears/Nose/Throat/Neck                    dizziness                    2017                

 

 Ears/Nose/Throat/Neck                    No nasal discharge                    
2017                

 

 Ears/Nose/Throat/Neck                    nasal allergies                    2017                

 

 Cardiovascular                    No chest pain/pressure                    2017                

 

 Cardiovascular                    No dyspnea                    2017    
            

 

 Respiratory                    No productive sputum                    2017                

 

 Respiratory                    No cough                    2017         
       

 

 Gastrointestinal                    No abdominal pain                    2017                

 

 Gastrointestinal                    No constipation                    2017                

 

 Gastrointestinal                    No diarrhea                    2017 
               

 

 Genitourinary/Nephrology                    No dysuria                                    

 

 Musculoskeletal                    No joint complaint                    2017                

 

 Dermatologic                    No rash                    2017         
       

 

 Neurologic                    No alteration of consciousness                  
  2017                

 

 Constitutional                    No recent illness                    2017                

 

 Constitutional                    No chills                    2017     
           

 

 Constitutional                    No fever                    2017      
          

 

 Eyes                    No eye erythema                    2017         
       

 

 Ears/Nose/Throat/Neck                    No nasal discharge                    
2017                

 

 Cardiovascular                    No chest pain/pressure                                    

 

 Cardiovascular                    No dyspnea                    2017    
            

 

 Respiratory                    No cough                    2017         
       

 

 Respiratory                    No dyspnea                    2017       
         

 

 Musculoskeletal                    joint complaint                    2017                

 

 Neurologic                    No alteration of consciousness                  
  2017                

 

 Neurologic                    No mental status change                    2017                

 

 Constitutional                    No recent illness                    06/15/
2016                

 

 Constitutional                    No night sweats                    06/15/
2016                

 

 Constitutional                    No chills                    06/15/2016     
           

 

 Constitutional                    No fatigue                    06/15/2016    
            

 

 Constitutional                    No fever                    06/15/2016      
          

 

 Constitutional                    No insomnia                    06/15/2016   
             

 

 Constitutional                    No malaise                    06/15/2016    
            

 

 Eyes                    No blindness                    06/15/2016            
    

 

 Eyes                    No vision change                    06/15/2016        
        

 

 Ears/Nose/Throat/Neck                    No dental pain                    06/
15/2016                

 

 Ears/Nose/Throat/Neck                    No dizziness                    06/15/
2016                

 

 Ears/Nose/Throat/Neck                    No dysphagia                    06/15/
2016                

 

 Ears/Nose/Throat/Neck                    No headache                    06/15/
2016                

 

 Ears/Nose/Throat/Neck                    No hearing loss                    06/
15/2016                

 

 Ears/Nose/Throat/Neck                    No nasal allergies                    
06/15/2016                

 

 Ears/Nose/Throat/Neck                    No sore throat                    06/
15/2016                

 

 Ears/Nose/Throat/Neck                    No postnasal drip                    
06/15/2016                

 

 Ears/Nose/Throat/Neck                    No sinus congestion                  
  06/15/2016                

 

 Cardiovascular                    No chest pain/pressure                    06/
15/2016                

 

 Cardiovascular                    No dyspnea                    06/15/2016    
            

 

 Cardiovascular                    No edema                    06/15/2016      
          

 

 Cardiovascular                    No exercise intolerance                    06
/15/2016                

 

 Cardiovascular                    No fatigue                    06/15/2016    
            

 

 Cardiovascular                    No hypertension                    06/15/
2016                

 

 Cardiovascular                    No near-syncope/dizziness                    
06/15/2016                

 

 Cardiovascular                    No palpitations                    06/15/
2016                

 

 Respiratory                    No chest tightness                    06/15/
2016                

 

 Respiratory                    No cigarette smoking                    06/15/
2016                

 

 Respiratory                    No cough                    06/15/2016         
       

 

 Respiratory                    No dyspnea on exertion                    06/15/
2016                

 

 Respiratory                    No dyspnea                    06/15/2016       
         

 

 Respiratory                    No pedal edema                    06/15/2016   
             

 

 Gastrointestinal                    No abdominal pain                    06/15/
2016                

 

 Gastrointestinal                    No constipation                    06/15/
2016                

 

 Gastrointestinal                    No diarrhea                    06/15/2016 
               

 

 Gastrointestinal                    No gastroesophageal reflux                
    06/15/2016                

 

 Gastrointestinal                    No hematochezia                    06/15/
2016                

 

 Gastrointestinal                    No nausea                    06/15/2016   
             

 

 Gastrointestinal                    No vomiting                    06/15/2016 
               

 

 Genitourinary/Nephrology                    No dysuria                    06/15
/2016                

 

 Genitourinary/Nephrology                    No impotence                    06/
15/2016                

 

 Genitourinary/Nephrology                    No nocturia                    06/
15/2016                

 

 Genitourinary/Nephrology                    No urinary urgency                
    06/15/2016                

 

 Genitourinary/Nephrology                    No urinary frequency              
      06/15/2016                

 

 Genitourinary/Nephrology                    No urinary incontinence           
         06/15/2016                

 

 Musculoskeletal                    No stiffness                    06/15/2016 
               

 

 Musculoskeletal                    No swelling                    06/15/2016  
              

 

 Musculoskeletal                    No arthralgia(s)                    06/15/
2016                

 

 Musculoskeletal                    No joint complaint                    06/15/
2016                

 

 Musculoskeletal                    No muscle weakness                    06/15/
2016                

 

 Musculoskeletal                    No myalgias                    06/15/2016  
              

 

 Dermatologic                    mole change                    06/15/2016     
           

 

 Dermatologic                    No rash                    06/15/2016         
       

 

 Dermatologic                    No sores                    06/15/2016        
        

 

 Dermatologic                    No scar                    06/15/2016         
       

 

 Neurologic                    No ataxia                    06/15/2016         
       

 

 Neurologic                    No dizziness                    06/15/2016      
          

 

 Neurologic                    No dyskinesia or tremor                    06/15/
2016                

 

 Neurologic                    No gait abnormality                    06/15/
2016                

 

 Neurologic                    No headache                    06/15/2016       
         

 

 Neurologic                    No neck pain                    06/15/2016      
          

 

 Neurologic                    No syncope                    06/15/2016        
        

 

 Psychiatric                    No anxiety                    06/15/2016       
         

 

 Psychiatric                    No depression                    06/15/2016    
            

 

 Constitutional                    No recent illness                    2015                

 

 Constitutional                    No chills                    2015     
           

 

 Constitutional                    No fatigue                    2015    
            

 

 Constitutional                    No fever                    2015      
          

 

 Constitutional                    No insomnia                    2015   
             

 

 Constitutional                    No malaise                    2015    
            

 

 Eyes                    No blindness                    2015            
    

 

 Eyes                    No vision change                    2015        
        

 

 Ears/Nose/Throat/Neck                    No dental pain                                    

 

 Ears/Nose/Throat/Neck                    No dizziness                    2015                

 

 Ears/Nose/Throat/Neck                    No dysphagia                    2015                

 

 Ears/Nose/Throat/Neck                    No headache                    2015                

 

 Ears/Nose/Throat/Neck                    No hearing loss                                    

 

 Ears/Nose/Throat/Neck                    No nasal allergies                    
2015                

 

 Ears/Nose/Throat/Neck                    No sore throat                                    

 

 Ears/Nose/Throat/Neck                    No postnasal drip                    
2015                

 

 Ears/Nose/Throat/Neck                    No sinus congestion                  
  2015                

 

 Cardiovascular                    No chest pain/pressure                                    

 

 Cardiovascular                    No dyspnea                    2015    
            

 

 Cardiovascular                    No edema                    2015      
          

 

 Cardiovascular                    No exercise intolerance                                    

 

 Cardiovascular                    No fatigue                    2015    
            

 

 Cardiovascular                    No near-syncope/dizziness                    
2015                

 

 Respiratory                    No chest tightness                    2015                

 

 Respiratory                    No cough                    2015         
       

 

 Respiratory                    No dyspnea                    2015       
         

 

 Respiratory                    No pedal edema                    2015   
             

 

 Gastrointestinal                    No abdominal pain                    2015                

 

 Gastrointestinal                    No constipation                    2015                

 

 Gastrointestinal                    No diarrhea                    2015 
               

 

 Gastrointestinal                    No gastroesophageal reflux                
    2015                

 

 Gastrointestinal                    No nausea                    2015   
             

 

 Gastrointestinal                    No vomiting                    2015 
               

 

 Genitourinary/Nephrology                    No dysuria                                    

 

 Genitourinary/Nephrology                    No nocturia                                    

 

 Genitourinary/Nephrology                    No urinary incontinence           
         2015                

 

 Musculoskeletal                    No stiffness                    2015 
               

 

 Musculoskeletal                    No swelling                    2015  
              

 

 Musculoskeletal                    No muscle weakness                    2015                

 

 Musculoskeletal                    No myalgias                    2015  
              

 

 Dermatologic                    No rash                    2015         
       

 

 Dermatologic                    No sores                    2015        
        

 

 Dermatologic                    No scar                    2015         
       

 

 Neurologic                    No dizziness                    2015      
          

 

 Neurologic                    No headache                    2015       
         

 

 Neurologic                    No neck pain                    2015      
          

 

 Neurologic                    No syncope                    2015        
        

 

 Psychiatric                    No anxiety                    2015       
         

 

 Psychiatric                    No depression                    2015    
            

 

 Constitutional                    No night sweats                    2015                

 

 Cardiovascular                    No hypertension                    2015                

 

 Cardiovascular                    No palpitations                    2015                

 

 Respiratory                    No cigarette smoking                    2015                

 

 Respiratory                    No dyspnea on exertion                    2015                

 

 Gastrointestinal                    No hematochezia                    2015                

 

 Genitourinary/Nephrology                    No impotence                                    

 

 Genitourinary/Nephrology                    No urinary frequency              
      2015                

 

 Genitourinary/Nephrology                    No urinary urgency                
    2015                

 

 Musculoskeletal                    No arthralgia(s)                    2015                

 

 Musculoskeletal                    No joint complaint                    2015                

 

 Neurologic                    No ataxia                    2015         
       

 

 Neurologic                    No dyskinesia or tremor                    2015                

 

 Neurologic                    No gait abnormality                    2015                

 

 Dermatologic                    mole change                    2015     
           

 

 Constitutional                    No recent illness                    2013                

 

 Constitutional                    No chills                    2013     
           

 

 Constitutional                    No fatigue                    2013    
            

 

 Constitutional                    No fever                    2013      
          

 

 Constitutional                    No insomnia                    2013   
             

 

 Constitutional                    No malaise                    2013    
            

 

 Cardiovascular                    No chest pain/pressure                                    

 

 Cardiovascular                    No dyspnea                    2013    
            

 

 Cardiovascular                    No edema                    2013      
          

 

 Cardiovascular                    No exercise intolerance                                    

 

 Cardiovascular                    No fatigue                    2013    
            

 

 Cardiovascular                    No near-syncope/dizziness                    
2013                

 

 Respiratory                    No chest tightness                    2013                

 

 Respiratory                    No cigarette smoking                    2013                

 

 Respiratory                    No cough                    2013         
       

 

 Respiratory                    No dyspnea                    2013       
         

 

 Respiratory                    No pedal edema                    2013   
             

 

 Respiratory                    No snoring                    2013       
         

 

 Respiratory                    No wheezing                    2013      
          

 

 Psychiatric                    No anxiety                    2013       
         

 

 Psychiatric                    No depression                    2013    
            

 

 Dermatologic                    No rash                    2013         
       

 

 Dermatologic                    No scar                    2013         
       

 

 Gastrointestinal                    No hemorrhoids                    2013                

 

 Gastrointestinal                    No abdominal pain                    2013                

 

 Gastrointestinal                    No constipation                    2013                

 

 Gastrointestinal                    No diarrhea                    2013 
               

 

 Gastrointestinal                    No gastroesophageal reflux                
    2013                

 

 Gastrointestinal                    No melena                    2013   
             

 

 Gastrointestinal                    No nausea                    2013   
             

 

 Gastrointestinal                    No vomiting                    2013 
               

 

 Constitutional                    No chills                    2013     
           

 

 Constitutional                    No fatigue                    2013    
            

 

 Constitutional                    No fever                    2013      
          

 

 Eyes                    No vision change                    2013        
        

 

 Ears/Nose/Throat/Neck                    No dizziness                    2013                

 

 Ears/Nose/Throat/Neck                    No headache                    2013                

 

 Cardiovascular                    No chest pain/pressure                                    

 

 Respiratory                    No dyspnea                    2013       
         

 

 Gastrointestinal                    No constipation                    2013                

 

 Gastrointestinal                    No diarrhea                    2013 
               

 

 Gastrointestinal                    No nausea                    2013   
             

 

 Gastrointestinal                    No vomiting                    2013 
               

 

 Neurologic                    No dizziness                    2013      
          

 

 Psychiatric                    No anxiety                    2013       
         

 

 Psychiatric                    No depression                    2013    
            

 

 Constitutional                    recent illness                    2012
                

 

 Constitutional                    No anorexia                    2012   
             

 

 Constitutional                    No night sweats                    2012                

 

 Constitutional                    No chills                    2012     
           

 

 Constitutional                    No diaphoresis                    2012
                

 

 Constitutional                    No fatigue                    2012    
            

 

 Constitutional                    No fever                    2012      
          

 

 Constitutional                    No insomnia                    2012   
             

 

 Constitutional                    No malaise                    2012    
            

 

 Eyes                    No eye discharge                    2012        
        

 

 Eyes                    No eye erythema                    2012         
       

 

 Cardiovascular                    No chest pain/pressure                                    

 

 Respiratory                    cough                    2012            
    

 

 Respiratory                    chest congestion                    2012 
               

 

 Respiratory                    No dyspnea on exertion                    2012                

 

 Respiratory                    No dyspnea                    2012       
         

 

 Gastrointestinal                    No vomiting                    2012 
               

 

 Gastrointestinal                    No nausea                    2012   
             

 

 Gastrointestinal                    No constipation                    2012                

 

 Gastrointestinal                    No diarrhea                    2012 
               

 

 Gastrointestinal                    No abdominal pain                    2012                

 

 Genitourinary/Nephrology                    No dysuria                                    

 

 Musculoskeletal                    No joint complaint                    2012                

 

 Constitutional                    No chills                    2012     
           

 

 Constitutional                    No fatigue                    2012    
            

 

 Eyes                    No vision change                    2012        
        

 

 Respiratory                    No dyspnea                    2012       
         

 

 Constitutional                    No fever                    2012      
          

 

 Ears/Nose/Throat/Neck                    No headache                    2012                

 

 Ears/Nose/Throat/Neck                    No dizziness                    2012                

 

 Cardiovascular                    No chest pain/pressure                                    

 

 Gastrointestinal                    No constipation                    2012                

 

 Gastrointestinal                    No diarrhea                    2012 
               

 

 Gastrointestinal                    No vomiting                    2012 
               

 

 Gastrointestinal                    No nausea                    2012   
             

 

 Neurologic                    No dizziness                    2012      
          

 

 Psychiatric                    No anxiety                    2012       
         

 

 Psychiatric                    No depression                    2012    
            

 

 Constitutional                    No fever                    2012      
          

 

 Cardiovascular                    No chest pain/pressure                                    

 

 Ears/Nose/Throat/Neck                    No dizziness                    2012                

 

 Ears/Nose/Throat/Neck                    No headache                    2012                

 

 Gastrointestinal                    No constipation                    2012                

 

 Gastrointestinal                    No diarrhea                    2012 
               

 

 Gastrointestinal                    No nausea                    2012   
             

 

 Gastrointestinal                    No vomiting                    2012 
               

 

 Neurologic                    No dizziness                    2012      
          

 

 Psychiatric                    No anxiety                    2012       
         

 

 Psychiatric                    No depression                    2012    
            



                                                                    



Physical Exam

                      





 Exam Name                    System Name                    Item Name         
           Status                    Result                    Effective Dates 
                   Notes                

 

 Full Exam - General                     Constitutional                     
general appearance                    Overall:                    well 
developed                    2018                    None                

 

 Full Exam - General                     Constitutional                     
general appearance                    Overall:                    in no acute 
distress                    2018                    None                

 

 Full Exam - General                     Constitutional                     
general appearance                    Overall:                    well 
nourished                    2018                    None                

 

 Full Exam - General                     Eyes                    conjunctiva
/eyelids                    Overall:                    conjunctiva clear      
              2018                    None                

 

 Full Exam - General                     Eyes                    conjunctiva
/eyelids                    Overall:                    cornea clear           
         2018                    None                

 

 Full Exam - General                     Eyes                    conjunctiva
/eyelids                    Overall:                    eyelids normal         
           2018                    None                

 

 Full Exam - General                     Ears/Nose/Throat                  
  lips/teeth/gingiva                    Overall:                    benign lips
                    2018                    None                

 

 Full Exam - General                     Ears/Nose/Throat                  
  oral cavity/pharynx/larynx                     Overall:                    
oral mucosa clear                    2018                    None        
        

 

 Full Exam - General                     Respiratory                    
respiratory effort/rhythm                    Overall:                    normal 
rate                    2018                    None                

 

 Full Exam - General                     Respiratory                    
respiratory effort/rhythm                    Overall:                    no 
retractions                    2018                    None              
  

 

 Full Exam - General                     Respiratory                    
auscultation                    Overall:                    breath sounds clear 
bilaterally                    2018                    None              
  

 

 Full Exam - General                     Cardiovascular                    
auscultation of heart                    Overall:                    regular 
rate                    2018                    None                

 

 Full Exam - General                     Cardiovascular                    
auscultation of heart                    Overall:                    normal 
heart sounds                    2018                    None             
   

 

 Full Exam - General                     Abdomen                    
abdominal exam                    Overall:                    normal bowel 
sounds                    2018                    None                

 

 Full Exam - General                     Abdomen                    
abdominal exam                    Overall:                    no tenderness    
                2018                    None                

 

 Full Exam - General                     Musculoskeletal                    
gait and station                    Overall:                    normal gait    
                2018                    None                

 

 Full Exam - General                     Musculoskeletal                    
gait and station                    Overall:                    normal station 
                   2018                    None                

 

 Full Exam - General                     Musculoskeletal                    
head and neck                    Overall:                    head atraumatic   
                 2018                    None                

 

 Full Exam - General                     Neurologic                    
cranial nerves                    Overall:                    crainial nerves 2 
- 12 grossly intact                    2018                    None      
          

 

 Full Exam - General                     Psychiatric                    
orientation/consciousness                    Overall:                    
oriented to person, place and time                    2018               
     None                

 

 Full Exam - General                     Psychiatric                    
mood and affect                    Overall:                    normal mood and 
affect                    2018                    None                

 

 Full Exam - General                     Psychiatric                    
appearance                    Overall:                    well-groomed, good 
eye contact                    2018                    None              
  

 

 Full Exam - General                     Constitutional                     
general appearance                    Hygiene/Attention to Grooming:           
         good hygiene                    2018                    None    
            

 

 Full Exam - General                     Eyes                    conjunctiva
/eyelids                    Overall:                    conjunctiva clear      
              2018                    None                

 

 Full Exam - General                     Eyes                    conjunctiva
/eyelids                    Overall:                    cornea clear           
         2018                    None                

 

 Full Exam - General                     Eyes                    conjunctiva
/eyelids                    Overall:                    eyelids normal         
           2018                    None                

 

 Full Exam - General                     Eyes                    pupils and 
irises                    Overall:                    pupils equal, round, 
reactive to light and accomodation                    2018               
     None                

 

 Full Exam - General                     Ears/Nose/Throat                  
  otoscopic exam                    Overall:                    external 
auditory canals clear                    2018                    None    
            

 

 Full Exam - General                     Ears/Nose/Throat                  
  otoscopic exam                    Overall:                    tympanic 
membranes clear                    2018                    None          
      

 

 Full Exam - General                     Ears/Nose/Throat                  
  hearing assessment                    Overall:                    hearing 
intact bilaterally                    2018                    None       
         

 

 Full Exam - General                     Ears/Nose/Throat                  
  lips/teeth/gingiva                    Overall:                    benign lips
                    2018                    None                

 

 Full Exam - General                     Ears/Nose/Throat                  
  lips/teeth/gingiva                    Overall:                    normal 
dentition                    2018                    None                

 

 Full Exam - General                     Ears/Nose/Throat                  
  lips/teeth/gingiva                    Overall:                    benign 
gingiva                    2018                    None                

 

 Full Exam - General                     Ears/Nose/Throat                  
  oral cavity/pharynx/larynx                     Overall:                    
oral mucosa clear                    2018                    None        
        

 

 Full Exam - General                     Ears/Nose/Throat                  
  oral cavity/pharynx/larynx                     Overall:                    
oropharyngeal mucosa clear                    2018                    
None                

 

 Full Exam - General                     Respiratory                    
auscultation                    Overall:                    breath sounds clear 
bilaterally                    2018                    None              
  

 

 Full Exam - General                     Respiratory                    
respiratory effort/rhythm                    Overall:                    no 
retractions                    2018                    None              
  

 

 Full Exam - General                     Respiratory                    
respiratory effort/rhythm                    Overall:                    normal 
rate                    2018                    None                

 

 Full Exam - General                     Cardiovascular                    
extremities                    Overall:                    no clubbing         
           2018                    None                

 

 Full Exam - General                     Cardiovascular                    
auscultation of heart                    Overall:                    regular 
rate                    2018                    None                

 

 Full Exam - General                     Cardiovascular                    
auscultation of heart                    Overall:                    normal 
heart sounds                    2018                    None             
   

 

 Full Exam - General                     Abdomen                    
abdominal exam                    Overall:                    no tenderness    
                2018                    None                

 

 Full Exam - General                     Abdomen                    
abdominal exam                    Overall:                    normal bowel 
sounds                    2018                    None                

 

 Full Exam - General                     Lymphatic                    neck 
nodes                    Overall:                    anterior cervical chain 
benign                    2018                    None                

 

 Full Exam - General                     Lymphatic                    neck 
nodes                    Overall:                    posterior cervical chain 
benign                    2018                    None                

 

 Full Exam - General                     Musculoskeletal                    
spine, ribs and pelvis                    Overall:                    good 
posture                    2018                    None                

 

 Full Exam - General                     Musculoskeletal                    
gait and station                    Overall:                    normal gait    
                2018                    None                

 

 Full Exam - General                     Musculoskeletal                    
gait and station                    Overall:                    normal station 
                   2018                    None                

 

 Full Exam - General                     Musculoskeletal                    
head and neck                    Overall:                    head atraumatic   
                 2018                    None                

 

 Full Exam - General                     Neurologic                    
cranial nerves                    Overall:                    crainial nerves 2 
- 12 grossly intact                    2018                    None      
          

 

 Full Exam - General                     Psychiatric                    
orientation/consciousness                    Overall:                    
oriented to person, place and time                    2018               
     None                

 

 Full Exam - General                     Constitutional                     
general appearance                    Overall:                    well 
developed                    2018                    None                

 

 Full Exam - General                     Constitutional                     
general appearance                    Overall:                    well 
nourished                    2018                    None                

 

 Full Exam - General                     Constitutional                     
general appearance                    Evidence of Distress:                    
mild distress                    2018                    None            
    

 

 Full Exam - General                     Constitutional                     
general appearance                    Evidence of Distress:                    
tearful                    2018                    None                

 

 Full Exam - General                     Ears/Nose/Throat                  
  otoscopic exam                    Tympanic membrane:                    air-
fluid level                    2018                    None              
  

 

 Full Exam - General                     Psychiatric                    
mood and affect                    Mood:                    flat               
     2018                    None                

 

 Full Exam - General                     Psychiatric                    
mood and affect                    Mood:                    depressed          
          2018                    None                

 

 Full Exam - General                     Psychiatric                    
mood and affect                    Mood:                    anxious            
        2018                    None                

 

 Full Exam - General                     Psychiatric                    
mood and affect                    Affect:                    flat             
       2018                    None                

 

 Full Exam - Orthopedics                    Constitutional                     
general appearance                    Overall:                    well 
nourished                    2017                    None                

 

 Full Exam - Orthopedics                    Constitutional                     
general appearance                    Overall:                    well 
developed                    2017                    None                

 

 Full Exam - Orthopedics                    Constitutional                     
general appearance                    Overall:                    in no acute 
distress                    2017                    None                

 

 Full Exam - Orthopedics                    Eyes                    conjunctiva/
eyelids                    Overall:                    conjunctiva clear       
             2017                    None                

 

 Full Exam - Orthopedics                    Eyes                    conjunctiva/
eyelids                    Overall:                    eyelids normal          
          2017                    None                

 

 Full Exam - Orthopedics                    Eyes                    conjunctiva/
eyelids                    Overall:                    cornea clear            
        2017                    None                

 

 Full Exam - Orthopedics                    Ears/Nose/Throat                    
oral cavity/pharynx/larynx                     Overall:                    oral 
mucosa clear                    2017                    None             
   

 

 Full Exam - Orthopedics                    Ears/Nose/Throat                    
lips/teeth/gingiva                    Overall:                    benign lips  
                  2017                    None                

 

 Full Exam - Orthopedics                    Respiratory                    
respiratory effort/rhythm                    Overall:                    no 
retractions                    2017                    None              
  

 

 Full Exam - Orthopedics                    Respiratory                    
respiratory effort/rhythm                    Overall:                    normal 
rate                    2017                    None                

 

 Full Exam - Orthopedics                    Respiratory                    
auscultation                    Overall:                    breath sounds clear 
bilaterally                    2017                    None              
  

 

 Full Exam - Orthopedics                    MS: left upper extremity           
         insp & palp - LUE                    Wrist:                    
swelling                    2017                    None                

 

 Full Exam - Orthopedics                    MS: left upper extremity           
         insp & palp - LUE                    Wrist:                    tender 
                   2017                    None                

 

 Full Exam - Orthopedics                    MS: left upper extremity           
         range of motion - LUE                    Wrist:                    
pain with extension                    2017                    None      
          

 

 Full Exam - Orthopedics                    MS: left upper extremity           
         range of motion - LUE                    Wrist:                    
pain with flexion                    2017                    None        
        

 

 Full Exam - Orthopedics                    Psychiatric                    
orientation/consciousness                    Overall:                    
oriented to person, place and time                    2017               
     None                

 

 Full Exam - Orthopedics                    Psychiatric                    mood 
and affect                    Overall:                    normal mood and 
affect                    2017                    None                

 

 Full Exam - Orthopedics                    Psychiatric                    
appearance                    Overall:                    well-groomed, good 
eye contact                    2017                    None              
  

 

 Full Exam - General                     Constitutional                     
general appearance                    Hygiene/Attention to Grooming:           
         good hygiene                    05/15/2017                    None    
            

 

 Full Exam - General                     Eyes                    conjunctiva
/eyelids                    Overall:                    conjunctiva clear      
              05/15/2017                    None                

 

 Full Exam - General                     Eyes                    conjunctiva
/eyelids                    Overall:                    eyelids normal         
           05/15/2017                    None                

 

 Full Exam - General                     Ears/Nose/Throat                  
  otoscopic exam                    Overall:                    external 
auditory canals clear                    05/15/2017                    None    
            

 

 Full Exam - General                     Ears/Nose/Throat                  
  otoscopic exam                    Overall:                    tympanic 
membranes clear                    05/15/2017                    None          
      

 

 Full Exam - General                     Ears/Nose/Throat                  
  lips/teeth/gingiva                    Overall:                    benign lips
                    05/15/2017                    None                

 

 Full Exam - General                     Ears/Nose/Throat                  
  oral cavity/pharynx/larynx                     Overall:                    
oral mucosa clear                    05/15/2017                    None        
        

 

 Full Exam - General                     Ears/Nose/Throat                  
  oral cavity/pharynx/larynx                     Overall:                    
oropharyngeal mucosa clear                    05/15/2017                    
None                

 

 Full Exam - General                     Respiratory                    
respiratory effort/rhythm                    Overall:                    no 
retractions                    05/15/2017                    None              
  

 

 Full Exam - General                     Respiratory                    
respiratory effort/rhythm                    Overall:                    normal 
rate                    05/15/2017                    None                

 

 Full Exam - General                     Cardiovascular                    
auscultation of heart                    Overall:                    regular 
rate                    05/15/2017                    None                

 

 Full Exam - General                     Cardiovascular                    
auscultation of heart                    Overall:                    normal 
heart sounds                    05/15/2017                    None             
   

 

 Full Exam - General                     Musculoskeletal                    
spine, ribs and pelvis                    Overall:                    good 
posture                    05/15/2017                    None                

 

 Full Exam - General                     Musculoskeletal                    
gait and station                    Overall:                    normal gait    
                05/15/2017                    None                

 

 Full Exam - General                     Musculoskeletal                    
gait and station                    Overall:                    normal station 
                   05/15/2017                    None                

 

 Full Exam - General                     Musculoskeletal                    
head and neck                    Overall:                    head atraumatic   
                 05/15/2017                    None                

 

 Full Exam - General                     Neurologic                    
cranial nerves                    Overall:                    crainial nerves 2 
- 12 grossly intact                    05/15/2017                    None      
          

 

 Full Exam - General                     Psychiatric                    
orientation/consciousness                    Overall:                    
oriented to person, place and time                    05/15/2017               
     None                

 

 Full Exam - General                     Psychiatric                    
mood and affect                    Overall:                    normal mood and 
affect                    05/15/2017                    None                

 

 Full Exam - General                     Constitutional                     
general appearance                    Overall:                    well 
developed                    05/15/2017                    None                

 

 Full Exam - General                     Constitutional                     
general appearance                    Overall:                    in no acute 
distress                    05/15/2017                    None                

 

 Full Exam - General                     Constitutional                     
general appearance                    Overall:                    well 
nourished                    05/15/2017                    None                

 

 Full Exam - General                     Respiratory                    
auscultation                    Lower lung field:                    expiratory 
wheezes                    05/15/2017                    None                

 

 Full Exam - General                     Lymphatic                    neck 
nodes                    Overall:                    posterior cervical chain 
benign                    05/15/2017                    None                

 

 Full Exam - General                     Lymphatic                    neck 
nodes                    Overall:                    anterior cervical chain 
benign                    05/15/2017                    None                

 

 Full Exam - General                     Constitutional                     
general appearance                    Development:                    well 
developed                    2017                    None                

 

 Full Exam - General                     Constitutional                     
general appearance                    Development:                    appears 
stated age                    2017                    None                

 

 Full Exam - General                     Constitutional                     
general appearance                    Hygiene/Attention to Grooming:           
         good hygiene                    2017                    None    
            

 

 Full Exam - General                     Eyes                    conjunctiva
/eyelids                    Overall:                    conjunctiva clear      
              2017                    None                

 

 Full Exam - General                     Eyes                    conjunctiva
/eyelids                    Overall:                    cornea clear           
         2017                    None                

 

 Full Exam - General                     Eyes                    conjunctiva
/eyelids                    Overall:                    eyelids normal         
           2017                    None                

 

 Full Exam - General                     Eyes                    pupils and 
irises                    Overall:                    pupils equal, round, 
reactive to light and accomodation                    2017               
     None                

 

 Full Exam - General                     Ears/Nose/Throat                  
  external ear                    Overall:                    normal appearance
                    2017                    None                

 

 Full Exam - General                     Ears/Nose/Throat                  
  external nose                    Overall:                    benign 
appearance                    2017                    None                

 

 Full Exam - General                     Ears/Nose/Throat                  
  otoscopic exam                    Overall:                    external 
auditory canals clear                    2017                    None    
            

 

 Full Exam - General                     Ears/Nose/Throat                  
  otoscopic exam                    Overall:                    tympanic 
membranes clear                    2017                    None          
      

 

 Full Exam - General                     Ears/Nose/Throat                  
  hearing assessment                    Overall:                    hearing 
intact bilaterally                    2017                    None       
         

 

 Full Exam - General                     Ears/Nose/Throat                  
  lips/teeth/gingiva                    Overall:                    benign lips
                    2017                    None                

 

 Full Exam - General                     Ears/Nose/Throat                  
  lips/teeth/gingiva                    Overall:                    normal 
dentition                    2017                    None                

 

 Full Exam - General                     Ears/Nose/Throat                  
  lips/teeth/gingiva                    Overall:                    benign 
gingiva                    2017                    None                

 

 Full Exam - General                     Ears/Nose/Throat                  
  oral cavity/pharynx/larynx                     Overall:                    
oral mucosa clear                    2017                    None        
        

 

 Full Exam - General                     Ears/Nose/Throat                  
  oral cavity/pharynx/larynx                     Overall:                    
oropharyngeal mucosa clear                    2017                    
None                

 

 Full Exam - General                     Ears/Nose/Throat                  
  oral cavity/pharynx/larynx                     Overall:                    
hypopharynx benign                    2017                    None       
         

 

 Full Exam - General                     Ears/Nose/Throat                  
  oral cavity/pharynx/larynx                     Overall:                    no 
masses                    2017                    None                

 

 Full Exam - General                     Neck                    thyroid   
                 Overall:                    nontender                    2017                    None                

 

 Full Exam - General                     Neck                    thyroid   
                 Overall:                    no mass lesions                    
2017                    None                

 

 Full Exam - General                     Respiratory                    
auscultation                    Overall:                    breath sounds clear 
bilaterally                    2017                    None              
  

 

 Full Exam - General                     Respiratory                    
respiratory effort/rhythm                    Overall:                    no 
retractions                    2017                    None              
  

 

 Full Exam - General                     Respiratory                    
respiratory effort/rhythm                    Overall:                    normal 
rate                    2017                    None                

 

 Full Exam - General                     Cardiovascular                    
inspection of carotid pulses                    Overall:                    
strong, bilaterally equal, no bruits                    2017             
       None                

 

 Full Exam - General                     Cardiovascular                    
extremities                    Overall:                    no clubbing         
           2017                    None                

 

 Full Exam - General                     Cardiovascular                    
auscultation of heart                    Overall:                    regular 
rate                    2017                    None                

 

 Full Exam - General                     Cardiovascular                    
auscultation of heart                    Overall:                    normal 
heart sounds                    2017                    None             
   

 

 Full Exam - General                     Cardiovascular                    
auscultation of heart                    Overall:                    no murmurs
                    2017                    None                

 

 Full Exam - General                     Abdomen                    
abdominal exam                    Overall:                    no tenderness    
                2017                    None                

 

 Full Exam - General                     Abdomen                    
abdominal exam                    Overall:                    normal bowel 
sounds                    2017                    None                

 

 Full Exam - General                     Abdomen                    liver 
and spleen exam                    Overall:                    no 
hepatosplenomegaly                    2017                    None       
         

 

 Full Exam - General                     Lymphatic                    neck 
nodes                    Overall:                    anterior cervical chain 
benign                    2017                    None                

 

 Full Exam - General                     Lymphatic                    neck 
nodes                    Overall:                    posterior cervical chain 
benign                    2017                    None                

 

 Full Exam - General                     Musculoskeletal                    
digits and nails                    Digits:                    a normal exam   
                 2017                    None                

 

 Full Exam - General                     Musculoskeletal                    
spine, ribs and pelvis                    Overall:                    spine 
benign                    2017                    None                

 

 Full Exam - General                     Musculoskeletal                    
spine, ribs and pelvis                    Overall:                    
sacroiliac joint benign                    2017                    None  
              

 

 Full Exam - General                     Musculoskeletal                    
spine, ribs and pelvis                    Overall:                    good 
posture                    2017                    None                

 

 Full Exam - General                     Musculoskeletal                    
gait and station                    Overall:                    normal gait    
                2017                    None                

 

 Full Exam - General                     Musculoskeletal                    
gait and station                    Overall:                    normal station 
                   2017                    None                

 

 Full Exam - General                     Musculoskeletal                    
head and neck                    Overall:                    head atraumatic   
                 2017                    None                

 

 Full Exam - General                     Musculoskeletal                    
head and neck                    Overall:                    cervical spine 
benign                    2017                    None                

 

 Full Exam - General                     Neurologic                    deep 
tendon reflexes                    Overall:                    deep tendon 
reflexes intact                    2017                    None          
      

 

 Full Exam - General                     Neurologic                    
cranial nerves                    Overall:                    crainial nerves 2 
- 12 grossly intact                    2017                    None      
          

 

 Full Exam - General                     Psychiatric                    
orientation/consciousness                    Overall:                    
oriented to person, place and time                    2017               
     None                

 

 Full Exam - General                     Psychiatric                    
mood and affect                    Overall:                    normal mood and 
affect                    2017                    None                

 

 Full Exam - Orthopedics                    Constitutional                     
general appearance                    Overall:                    well 
nourished                    2017                    None                

 

 Full Exam - Orthopedics                    Constitutional                     
general appearance                    Overall:                    well 
developed                    2017                    None                

 

 Full Exam - Orthopedics                    Constitutional                     
general appearance                    Overall:                    in no acute 
distress                    2017                    None                

 

 Full Exam - Orthopedics                    Eyes                    conjunctiva/
eyelids                    Overall:                    conjunctiva clear       
             2017                    None                

 

 Full Exam - Orthopedics                    Eyes                    conjunctiva/
eyelids                    Overall:                    eyelids normal          
          2017                    None                

 

 Full Exam - Orthopedics                    Ears/Nose/Throat                    
lips/teeth/gingiva                    Overall:                    benign lips  
                  2017                    None                

 

 Full Exam - Orthopedics                    Ears/Nose/Throat                    
oral cavity/pharynx/larynx                     Overall:                    oral 
mucosa clear                    2017                    None             
   

 

 Full Exam - Orthopedics                    Respiratory                    
respiratory effort/rhythm                    Overall:                    no 
retractions                    2017                    None              
  

 

 Full Exam - Orthopedics                    Respiratory                    
respiratory effort/rhythm                    Overall:                    normal 
rate                    2017                    None                

 

 Full Exam - Orthopedics                    Psychiatric                    
orientation/consciousness                    Overall:                    
oriented to person, place and time                    2017               
     None                

 

 Full Exam - Orthopedics                    Psychiatric                    mood 
and affect                    Overall:                    normal mood and 
affect                    2017                    None                

 

 Full Exam - Orthopedics                    Psychiatric                    
appearance                    Overall:                    well-groomed, good 
eye contact                    2017                    None              
  

 

 Full Exam - Orthopedics                    MS: right upper extremity          
          insp & palp - RUE                    Wrist:                    
redness                    2017                    None                

 

 Full Exam - Orthopedics                    MS: right upper extremity          
          insp & palp - RUE                    Wrist:                    joint 
swelling                    2017                    None                

 

 Full Exam - Orthopedics                    MS: right upper extremity          
          range of motion - RUE                    Wrist:                    
pain with extension                    2017                    None      
          

 

 Full Exam - General                     Constitutional                     
general appearance                    Development:                    well 
developed                    06/15/2016                    None                

 

 Full Exam - General                     Constitutional                     
general appearance                    Development:                    appears 
stated age                    06/15/2016                    None                

 

 Full Exam - General                     Constitutional                     
general appearance                    Hygiene/Attention to Grooming:           
         good hygiene                    06/15/2016                    None    
            

 

 Full Exam - General                     Eyes                    conjunctiva
/eyelids                    Overall:                    conjunctiva clear      
              06/15/2016                    None                

 

 Full Exam - General                     Eyes                    conjunctiva
/eyelids                    Overall:                    cornea clear           
         06/15/2016                    None                

 

 Full Exam - General                     Eyes                    conjunctiva
/eyelids                    Overall:                    eyelids normal         
           06/15/2016                    None                

 

 Full Exam - General                     Eyes                    pupils and 
irises                    Overall:                    pupils equal, round, 
reactive to light and accomodation                    06/15/2016               
     None                

 

 Full Exam - General                     Ears/Nose/Throat                  
  external ear                    Overall:                    normal appearance
                    06/15/2016                    None                

 

 Full Exam - General                     Ears/Nose/Throat                  
  external nose                    Overall:                    benign 
appearance                    06/15/2016                    None                

 

 Full Exam - General                     Ears/Nose/Throat                  
  otoscopic exam                    Overall:                    external 
auditory canals clear                    06/15/2016                    None    
            

 

 Full Exam - General                     Ears/Nose/Throat                  
  otoscopic exam                    Overall:                    tympanic 
membranes clear                    06/15/2016                    None          
      

 

 Full Exam - General                     Ears/Nose/Throat                  
  hearing assessment                    Overall:                    hearing 
intact bilaterally                    06/15/2016                    None       
         

 

 Full Exam - General                     Ears/Nose/Throat                  
  lips/teeth/gingiva                    Overall:                    benign lips
                    06/15/2016                    None                

 

 Full Exam - General                     Ears/Nose/Throat                  
  lips/teeth/gingiva                    Overall:                    normal 
dentition                    06/15/2016                    None                

 

 Full Exam - General                     Ears/Nose/Throat                  
  lips/teeth/gingiva                    Overall:                    benign 
gingiva                    06/15/2016                    None                

 

 Full Exam - General                     Ears/Nose/Throat                  
  oral cavity/pharynx/larynx                     Overall:                    
oral mucosa clear                    06/15/2016                    None        
        

 

 Full Exam - General                     Ears/Nose/Throat                  
  oral cavity/pharynx/larynx                     Overall:                    
oropharyngeal mucosa clear                    06/15/2016                    
None                

 

 Full Exam - General                     Ears/Nose/Throat                  
  oral cavity/pharynx/larynx                     Overall:                    
hypopharynx benign                    06/15/2016                    None       
         

 

 Full Exam - General                     Ears/Nose/Throat                  
  oral cavity/pharynx/larynx                     Overall:                    no 
masses                    06/15/2016                    None                

 

 Full Exam - General                     Neck                    thyroid   
                 Overall:                    nontender                    06/15/
2016                    None                

 

 Full Exam - General                     Neck                    thyroid   
                 Overall:                    no mass lesions                    
06/15/2016                    None                

 

 Full Exam - General                     Respiratory                    
auscultation                    Overall:                    breath sounds clear 
bilaterally                    06/15/2016                    None              
  

 

 Full Exam - General                     Respiratory                    
respiratory effort/rhythm                    Overall:                    no 
retractions                    06/15/2016                    None              
  

 

 Full Exam - General                     Respiratory                    
respiratory effort/rhythm                    Overall:                    normal 
rate                    06/15/2016                    None                

 

 Full Exam - General                     Cardiovascular                    
inspection of carotid pulses                    Overall:                    
strong, bilaterally equal, no bruits                    06/15/2016             
       None                

 

 Full Exam - General                     Cardiovascular                    
extremities                    Overall:                    no clubbing         
           06/15/2016                    None                

 

 Full Exam - General                     Cardiovascular                    
auscultation of heart                    Overall:                    regular 
rate                    06/15/2016                    None                

 

 Full Exam - General                     Cardiovascular                    
auscultation of heart                    Overall:                    normal 
heart sounds                    06/15/2016                    None             
   

 

 Full Exam - General                     Cardiovascular                    
auscultation of heart                    Overall:                    no murmurs
                    06/15/2016                    None                

 

 Full Exam - General                     Abdomen                    
abdominal exam                    Overall:                    no tenderness    
                06/15/2016                    None                

 

 Full Exam - General                     Abdomen                    
abdominal exam                    Overall:                    normal bowel 
sounds                    06/15/2016                    None                

 

 Full Exam - General                     Abdomen                    liver 
and spleen exam                    Overall:                    no 
hepatosplenomegaly                    06/15/2016                    None       
         

 

 Full Exam - General                     Lymphatic                    neck 
nodes                    Overall:                    anterior cervical chain 
benign                    06/15/2016                    None                

 

 Full Exam - General                     Lymphatic                    neck 
nodes                    Overall:                    posterior cervical chain 
benign                    06/15/2016                    None                

 

 Full Exam - General                     Musculoskeletal                    
digits and nails                    Digits:                    a normal exam   
                 06/15/2016                    None                

 

 Full Exam - General                     Musculoskeletal                    
spine, ribs and pelvis                    Overall:                    spine 
benign                    06/15/2016                    None                

 

 Full Exam - General                     Musculoskeletal                    
spine, ribs and pelvis                    Overall:                    
sacroiliac joint benign                    06/15/2016                    None  
              

 

 Full Exam - General                     Musculoskeletal                    
spine, ribs and pelvis                    Overall:                    good 
posture                    06/15/2016                    None                

 

 Full Exam - General                     Musculoskeletal                    
gait and station                    Overall:                    normal gait    
                06/15/2016                    None                

 

 Full Exam - General                     Musculoskeletal                    
gait and station                    Overall:                    normal station 
                   06/15/2016                    None                

 

 Full Exam - General                     Musculoskeletal                    
head and neck                    Overall:                    head atraumatic   
                 06/15/2016                    None                

 

 Full Exam - General                     Musculoskeletal                    
head and neck                    Overall:                    cervical spine 
benign                    06/15/2016                    None                

 

 Full Exam - General                     Integument                    
inspection of skin                    Overall:                    few scattered 
moles, no gross abnormalities                    06/15/2016                    
dermatofibroma of leg, darkening of skin on lower back.                

 

 Full Exam - General                     Neurologic                    deep 
tendon reflexes                    Overall:                    deep tendon 
reflexes intact                    06/15/2016                    None          
      

 

 Full Exam - General                     Neurologic                    
cranial nerves                    Overall:                    crainial nerves 2 
- 12 grossly intact                    06/15/2016                    None      
          

 

 Full Exam - General                     Psychiatric                    
orientation/consciousness                    Overall:                    
oriented to person, place and time                    06/15/2016               
     None                

 

 Full Exam - General                     Psychiatric                    
mood and affect                    Overall:                    normal mood and 
affect                    06/15/2016                    None                

 

 Full Exam - General                     Constitutional                     
general appearance                    Development:                    well 
developed                    2015                    None                

 

 Full Exam - General                     Constitutional                     
general appearance                    Development:                    appears 
stated age                    2015                    None                

 

 Full Exam - General                     Constitutional                     
general appearance                    Hygiene/Attention to Grooming:           
         good hygiene                    2015                    None    
            

 

 Full Exam - General                     Eyes                    conjunctiva
/eyelids                    Overall:                    conjunctiva clear      
              2015                    None                

 

 Full Exam - General                     Eyes                    conjunctiva
/eyelids                    Overall:                    cornea clear           
         2015                    None                

 

 Full Exam - General                     Eyes                    conjunctiva
/eyelids                    Overall:                    eyelids normal         
           2015                    None                

 

 Full Exam - General                     Eyes                    pupils and 
irises                    Overall:                    pupils equal, round, 
reactive to light and accomodation                    2015               
     None                

 

 Full Exam - General                     Ears/Nose/Throat                  
  otoscopic exam                    Overall:                    external 
auditory canals clear                    2015                    None    
            

 

 Full Exam - General                     Ears/Nose/Throat                  
  otoscopic exam                    Overall:                    tympanic 
membranes clear                    2015                    None          
      

 

 Full Exam - General                     Ears/Nose/Throat                  
  lips/teeth/gingiva                    Overall:                    benign lips
                    2015                    None                

 

 Full Exam - General                     Ears/Nose/Throat                  
  lips/teeth/gingiva                    Overall:                    normal 
dentition                    2015                    None                

 

 Full Exam - General                     Ears/Nose/Throat                  
  oral cavity/pharynx/larynx                     Overall:                    
oral mucosa clear                    2015                    None        
        

 

 Full Exam - General                     Ears/Nose/Throat                  
  oral cavity/pharynx/larynx                     Overall:                    
oropharyngeal mucosa clear                    2015                    
None                

 

 Full Exam - General                     Ears/Nose/Throat                  
  oral cavity/pharynx/larynx                     Overall:                    
hypopharynx benign                    2015                    None       
         

 

 Full Exam - General                     Ears/Nose/Throat                  
  oral cavity/pharynx/larynx                     Overall:                    no 
masses                    2015                    None                

 

 Full Exam - General                     Respiratory                    
auscultation                    Overall:                    breath sounds clear 
bilaterally                    2015                    None              
  

 

 Full Exam - General                     Respiratory                    
respiratory effort/rhythm                    Overall:                    no 
retractions                    2015                    None              
  

 

 Full Exam - General                     Respiratory                    
respiratory effort/rhythm                    Overall:                    normal 
rate                    2015                    None                

 

 Full Exam - General                     Cardiovascular                    
extremities                    Overall:                    no clubbing         
           2015                    None                

 

 Full Exam - General                     Cardiovascular                    
auscultation of heart                    Overall:                    regular 
rate                    2015                    None                

 

 Full Exam - General                     Cardiovascular                    
auscultation of heart                    Overall:                    normal 
heart sounds                    2015                    None             
   

 

 Full Exam - General                     Abdomen                    
abdominal exam                    Overall:                    no tenderness    
                2015                    None                

 

 Full Exam - General                     Abdomen                    
abdominal exam                    Overall:                    normal bowel 
sounds                    2015                    None                

 

 Full Exam - General                     Lymphatic                    neck 
nodes                    Overall:                    anterior cervical chain 
benign                    2015                    None                

 

 Full Exam - General                     Lymphatic                    neck 
nodes                    Overall:                    posterior cervical chain 
benign                    2015                    None                

 

 Full Exam - General                     Musculoskeletal                    
spine, ribs and pelvis                    Overall:                    spine 
benign                    2015                    None                

 

 Full Exam - General                     Musculoskeletal                    
spine, ribs and pelvis                    Overall:                    
sacroiliac joint benign                    2015                    None  
              

 

 Full Exam - General                     Musculoskeletal                    
spine, ribs and pelvis                    Overall:                    good 
posture                    2015                    None                

 

 Full Exam - General                     Musculoskeletal                    
head and neck                    Overall:                    head atraumatic   
                 2015                    None                

 

 Full Exam - General                     Musculoskeletal                    
head and neck                    Overall:                    cervical spine 
benign                    2015                    None                

 

 Full Exam - General                     Neurologic                    deep 
tendon reflexes                    Overall:                    deep tendon 
reflexes intact                    2015                    None          
      

 

 Full Exam - General                     Neurologic                    
cranial nerves                    Overall:                    crainial nerves 2 
- 12 grossly intact                    2015                    None      
          

 

 Full Exam - General                     Psychiatric                    
orientation/consciousness                    Overall:                    
oriented to person, place and time                    2015               
     None                

 

 Full Exam - General                     Psychiatric                    
mood and affect                    Overall:                    normal mood and 
affect                    2015                    None                

 

 Full Exam - General                     Ears/Nose/Throat                  
  external ear                    Overall:                    normal appearance
                    2015                    None                

 

 Full Exam - General                     Ears/Nose/Throat                  
  external nose                    Overall:                    benign 
appearance                    2015                    None                

 

 Full Exam - General                     Ears/Nose/Throat                  
  hearing assessment                    Overall:                    hearing 
intact bilaterally                    2015                    None       
         

 

 Full Exam - General                     Ears/Nose/Throat                  
  lips/teeth/gingiva                    Overall:                    benign 
gingiva                    2015                    None                

 

 Full Exam - General                     Neck                    thyroid   
                 Overall:                    no mass lesions                    
2015                    None                

 

 Full Exam - General                     Neck                    thyroid   
                 Overall:                    nontender                    2015                    None                

 

 Full Exam - General                     Cardiovascular                    
inspection of carotid pulses                    Overall:                    
strong, bilaterally equal, no bruits                    2015             
       None                

 

 Full Exam - General                     Cardiovascular                    
auscultation of heart                    Overall:                    no murmurs
                    2015                    None                

 

 Full Exam - General                     Abdomen                    liver 
and spleen exam                    Overall:                    no 
hepatosplenomegaly                    2015                    None       
         

 

 Full Exam - General                     Musculoskeletal                    
digits and nails                    Digits:                    a normal exam   
                 2015                    None                

 

 Full Exam - General                     Musculoskeletal                    
gait and station                    Overall:                    normal gait    
                2015                    None                

 

 Full Exam - General                     Musculoskeletal                    
gait and station                    Overall:                    normal station 
                   2015                    None                

 

 Full Exam - General                     Integument                    
inspection of skin                    Overall:                    few scattered 
moles, no gross abnormalities                    2015                    
dermatofibroma of leg, darkening of skin on lower back.                

 

 Full Exam - General                     Constitutional                     
general appearance                    Overall:                    well 
nourished                    2013                    None                

 

 Full Exam - General                     Constitutional                     
general appearance                    Overall:                    well 
developed                    2013                    None                

 

 Full Exam - General                     Constitutional                     
general appearance                    Overall:                    in no acute 
distress                    2013                    None                

 

 Full Exam - General                     Psychiatric                    
orientation/consciousness                    Overall:                    
oriented to person, place and time                    2013               
     None                

 

 Full Exam - General                     Psychiatric                    
mood and affect                    Mood:                    happy              
      2013                    None                

 

 Full Exam - General                     Psychiatric                    
mood and affect                    Overall:                    normal mood and 
affect                    2013                    None                

 

 Full Exam - General                     Musculoskeletal                    
gait and station                    Overall:                    normal gait    
                2013                    None                

 

 Full Exam - General                     Musculoskeletal                    
gait and station                    Overall:                    normal station 
                   2013                    None                

 

 Full Exam - General                     Cardiovascular                    
auscultation of heart                    Overall:                    regular 
rate                    2013                    None                

 

 Full Exam - General                     Cardiovascular                    
auscultation of heart                    Overall:                    normal 
heart sounds                    2013                    None             
   

 

 Full Exam - General                     Cardiovascular                    
auscultation of heart                    Overall:                    no murmurs
                    2013                    None                

 

 Full Exam - General                     Respiratory                    
respiratory effort/rhythm                    Overall:                    normal 
rate                    2013                    None                

 

 Full Exam - General                     Respiratory                    
respiratory effort/rhythm                    Overall:                    no 
retractions                    2013                    None              
  

 

 Full Exam - General                     Respiratory                    
auscultation                    Overall:                    breath sounds clear 
bilaterally                    2013                    None              
  

 

 Full Exam - General                     Musculoskeletal                    
lower extremity                    Inspection -  ankle:                    a 
normal exam                    2013                    None              
  

 

 Full Exam - General                     Musculoskeletal                    
lower extremity                    Palpation -  ankle:                    a 
normal exam                    2013                    None              
  

 

 Full Exam - General                     Musculoskeletal                    
lower extremity                    Palpation -  ankle:                    
tender @ achilles tendon                    2013                    None 
               

 

 Full Exam - General                     Musculoskeletal                    
lower extremity                    Palpation -  ankle:                    
tender @ plantar fascia insertion                    2013                
    None                

 

 Full Exam - General                     Ears/Nose/Throat                  
  oral cavity/pharynx/larynx                     Overall:                    
oropharyngeal mucosa clear                    2013                    
None                

 

 Full Exam - General                     Ears/Nose/Throat                  
  oral cavity/pharynx/larynx                     Overall:                    no 
masses                    2013                    None                

 

 Full Exam - General 1995                    Ears/Nose/Throat                  
  oral cavity/pharynx/larynx                     Overall:                    
oral mucosa clear                    2013                    None        
        

 

 Full Exam - General                     Ears/Nose/Throat                  
  otoscopic exam                    Overall:                    tympanic 
membranes clear                    2013                    None          
      

 

 Full Exam - General 1995                    Ears/Nose/Throat                  
  otoscopic exam                    Overall:                    external 
auditory canals clear                    2013                    None    
            

 

 Full Exam - General                     Eyes                    pupils and 
irises                    Overall:                    pupils equal, round, 
reactive to light and accomodation                    2013               
     None                

 

 Full Exam - General                     Constitutional                     
general appearance                    Overall:                    well 
developed                    2013                    None                

 

 Full Exam - General                     Constitutional                     
general appearance                    Overall:                    in no acute 
distress                    2013                    None                

 

 Full Exam - General                     Constitutional                     
general appearance                    Overall:                    well 
nourished                    2013                    None                

 

 Full Exam - General                     Respiratory                    
auscultation                    Overall:                    breath sounds clear 
bilaterally                    2013                    None              
  

 

 Full Exam - General                     Respiratory                    
respiratory effort/rhythm                    Overall:                    no 
retractions                    2013                    None              
  

 

 Full Exam - General                     Respiratory                    
respiratory effort/rhythm                    Overall:                    normal 
rate                    2013                    None                

 

 Full Exam - General                     Cardiovascular                    
auscultation of heart                    Overall:                    regular 
rate                    2013                    None                

 

 Full Exam - General 1995                    Cardiovascular                    
auscultation of heart                    Overall:                    normal 
heart sounds                    2013                    None             
   

 

 Full Exam - General                     Cardiovascular                    
auscultation of heart                    Overall:                    no murmurs
                    2013                    None                

 

 Full Exam - General 1995                    Integument                    
inspection of skin                    Dermatitis:                    erythema  
                  2013                    None                

 

 Full Exam - General 1995                    Integument                    
inspection of skin                    Dermatitis:                    dryness/
flaking                    2013                    None                

 

 Full Exam - General 1995                    Integument                    
inspection of skin                    Dermatitis:                    scaling   
                 2013                    None                

 

 Full Exam - General 1995                    Integument                    
inspection of skin                    Dermatitis:                    thickened 
                   2013                    None                

 

 Full Exam - General 1995                    Integument                    
inspection of skin                    Dermatitis:                    
lichenification                    2013                    None          
      

 

 Full Exam - General                     Integument                    
inspection of skin                    Location:                    right arm   
                 2013                    None                

 

 Full Exam - General                     Neurologic                    gait
                    Overall:                    no ataxia, no unsteadiness     
               2013                    None                

 

 Full Exam - General                     Psychiatric                    
orientation/consciousness                    Overall:                    
oriented to person, place and time                    2013               
     None                

 

 Full Exam - General                     Psychiatric                    
mood and affect                    Overall:                    normal mood and 
affect                    2013                    None                

 

 Full Exam - General                     Psychiatric                    
mood and affect                    Mood:                    happy              
      2013                    None                

 

 Full Exam - General                     Integument                    
inspection of skin                    Location:                    left hand   
                 2013                    None                

 

 Full Exam - General                     Integument                    
inspection of skin                    Location:                    right hand  
                  2013                    None                

 

 Full Exam - General                     Constitutional                     
general appearance                    Overall:                    well 
developed                    2012                    None                

 

 Full Exam - General                     Constitutional                     
general appearance                    Overall:                    in no acute 
distress                    2012                    None                

 

 Full Exam - General                     Constitutional                     
general appearance                    Overall:                    well 
nourished                    2012                    None                

 

 Full Exam - General                     Respiratory                    
auscultation                    Overall:                    breath sounds clear 
bilaterally                    2012                    None              
  

 

 Full Exam - General                     Respiratory                    
respiratory effort/rhythm                    Overall:                    no 
retractions                    2012                    None              
  

 

 Full Exam - General                     Respiratory                    
respiratory effort/rhythm                    Overall:                    normal 
rate                    2012                    None                

 

 Full Exam - General                     Cardiovascular                    
auscultation of heart                    Overall:                    regular 
rate                    2012                    None                

 

 Full Exam - General                     Cardiovascular                    
auscultation of heart                    Overall:                    normal 
heart sounds                    2012                    None             
   

 

 Full Exam - General                     Cardiovascular                    
auscultation of heart                    Overall:                    no murmurs
                    2012                    None                

 

 Full Exam - General                     Integument                    
inspection of skin                    Dermatitis:                    erythema  
                  2012                    None                

 

 Full Exam - General                     Integument                    
inspection of skin                    Dermatitis:                    dryness/
flaking                    2012                    None                

 

 Full Exam - General                     Integument                    
inspection of skin                    Dermatitis:                    scaling   
                 2012                    None                

 

 Full Exam - General                     Integument                    
inspection of skin                    Dermatitis:                    thickened 
                   2012                    None                

 

 Full Exam - General                     Integument                    
inspection of skin                    Dermatitis:                    
lichenification                    2012                    None          
      

 

 Full Exam - General                     Integument                    
inspection of skin                    Dermatitis:                    
excoriation                    2012                    over dorsum of the 
right hand                

 

 Full Exam - General                     Neurologic                    gait
                    Overall:                    no ataxia, no unsteadiness     
               2012                    None                

 

 Full Exam - General                     Psychiatric                    
orientation/consciousness                    Overall:                    
oriented to person, place and time                    2012               
     None                

 

 Full Exam - General                     Psychiatric                    
mood and affect                    Overall:                    normal mood and 
affect                    2012                    None                

 

 Full Exam - General                     Psychiatric                    
mood and affect                    Mood:                    happy              
      2012                    None                

 

 Full Exam - General                     Ears/Nose/Throat                  
  otoscopic exam                    Overall:                    external 
auditory canals clear                    2012                    None    
            

 

 Full Exam - General 1995                    Ears/Nose/Throat                  
  otoscopic exam                    Overall:                    tympanic 
membranes clear                    2012                    None          
      

 

 Full Exam - General                     Ears/Nose/Throat                  
  oral cavity/pharynx/larynx                     Overall:                    
oral mucosa clear                    2012                    None        
        

 

 Full Exam - General                     Integument                    
inspection of skin                    Dermatitis:                    scaling   
                 2012                    None                

 

 Full Exam - General                     Integument                    
inspection of skin                    Dermatitis:                    thickened 
                   2012                    None                

 

 Full Exam - General                     Integument                    
inspection of skin                    Dermatitis:                    
lichenification                    2012                    None          
      

 

 Full Exam - General                     Neurologic                    gait
                    Overall:                    no ataxia, no unsteadiness     
               2012                    None                

 

 Full Exam - General                     Psychiatric                    
orientation/consciousness                    Overall:                    
oriented to person, place and time                    2012               
     None                

 

 Full Exam - General                     Psychiatric                    
mood and affect                    Overall:                    normal mood and 
affect                    2012                    None                

 

 Full Exam - General                     Psychiatric                    
mood and affect                    Mood:                    happy              
      2012                    None                

 

 Full Exam - General                     Integument                    
inspection of skin                    Location:                    right arm   
                 2012                    None                

 

 Full Exam - General                     Integument                    
inspection of skin                    Location:                    left arm    
                2012                    None                

 

 Full Exam - General                     Constitutional                     
general appearance                    Overall:                    well 
developed                    2012                    None                

 

 Full Exam - General 1995                    Constitutional                     
general appearance                    Overall:                    in no acute 
distress                    2012                    None                

 

 Full Exam - General 1995                    Constitutional                     
general appearance                    Overall:                    well 
nourished                    2012                    None                

 

 Full Exam - General 1995                    Respiratory                    
auscultation                    Overall:                    breath sounds clear 
bilaterally                    2012                    None              
  

 

 Full Exam - General 1995                    Respiratory                    
respiratory effort/rhythm                    Overall:                    no 
retractions                    2012                    None              
  

 

 Full Exam - General 1995                    Respiratory                    
respiratory effort/rhythm                    Overall:                    normal 
rate                    2012                    None                

 

 Full Exam - General 1995                    Cardiovascular                    
auscultation of heart                    Overall:                    regular 
rate                    2012                    None                

 

 Full Exam - General 1995                    Cardiovascular                    
auscultation of heart                    Overall:                    normal 
heart sounds                    2012                    None             
   

 

 Full Exam - General                     Cardiovascular                    
auscultation of heart                    Overall:                    no murmurs
                    2012                    None                

 

 Full Exam - General                     Integument                    
inspection of skin                    Dermatitis:                    erythema  
                  2012                    None                

 

 Full Exam - General                     Integument                    
inspection of skin                    Dermatitis:                    dryness/
flaking                    2012                    None                

 

 Full Exam - General                     Psychiatric                    
orientation/consciousness                    Overall:                    
oriented to person, place and time                    2012               
     None                

 

 Full Exam - General                     Psychiatric                    
mood and affect                    Mood:                    happy              
      2012                    None                

 

 Full Exam - General                     Psychiatric                    
mood and affect                    Overall:                    normal mood and 
affect                    2012                    None                

 

 Full Exam - General                     Neurologic                    gait
                    Overall:                    no ataxia, no unsteadiness     
               2012                    None                

 

 Full Exam - General 1995                    Integument                    
inspection of skin                    Dermatitis:                    erythema  
                  2012                    None                

 

 Full Exam - General                     Integument                    
inspection of skin                    Dermatitis:                    dryness/
flaking                    2012                    None                

 

 Full Exam - General                     Integument                    
inspection of skin                    Dermatitis:                    scaling   
                 2012                    None                

 

 Full Exam - General                     Integument                    
inspection of skin                    Dermatitis:                    thickened 
                   2012                    None                

 

 Full Exam - General                     Integument                    
inspection of skin                    Dermatitis:                    
lichenification                    2012                    None          
      

 

 Full Exam - General                     Integument                    
inspection of skin                    Dermatitis:                    
excoriation                    2012                    over dorsum of the 
right hand and over the right mid abdomen                

 

 Full Exam - General                     Integument                    
palpation                    Hand:                    tender                    
2012                    None                

 

 Full Exam - General                     Cardiovascular                    
auscultation of heart                    Overall:                    regular 
rate                    2012                    None                

 

 Full Exam - General                     Cardiovascular                    
auscultation of heart                    Overall:                    normal 
heart sounds                    2012                    None             
   

 

 Full Exam - General                     Cardiovascular                    
auscultation of heart                    Overall:                    no murmurs
                    2012                    None                

 

 Full Exam - General                     Respiratory                    
auscultation                    Overall:                    breath sounds clear 
bilaterally                    2012                    None              
  

 

 Full Exam - General                     Respiratory                    
respiratory effort/rhythm                    Overall:                    normal 
rate                    2012                    None                

 

 Full Exam - General                     Respiratory                    
respiratory effort/rhythm                    Overall:                    no 
retractions                    2012                    None              
  

 

 Full Exam - General                     Constitutional                     
general appearance                    Overall:                    well 
nourished                    2012                    None                

 

 Full Exam - General                     Constitutional                     
general appearance                    Overall:                    well 
developed                    2012                    None                

 

 Full Exam - General                     Constitutional                     
general appearance                    Overall:                    in no acute 
distress                    2012                    None                



                                                                              



Procedures

                      





 Procedure                    Codes                    Date                

 

                     TRIAMCINOLONE ACET INJ NOS                                
      CPT-4:                     2017                

 

                     TRIAMCINOLONE ACET INJ NOS                                
      CPT-4:                     05/15/2017                

 

                     ROCEPHIN,  MG                                      
CPT-4:                     2012                

 

                     KETOROLAC TROMETHAMINE INJ                                
      CPT-4:                     2012                

 

                     THER/PROPH/DIAG INJ SC/IM                                 
     CPT-4: 65805                    2012                

 

                     TRIAMCINOLONE ACET INJ NOS                                
      CPT-4:                     2012                

 

                     THER/PROPH/DIAG INJ SC/IM                                 
     CPT-4: 84986                    2012                



                                                                               
                                                           



Vital Signs

                      





 Date                    Vital                









 2018                                        Blood Pressure 1: 122/80 Code
: 8480-6                                                          BMI: 26.6 Code
: 45303-8                                                          Heart Rate 1
: 76 bpm                                                          Height: 6'2" 
                                                         SpO2: 96%             
                                             Weight: 210 lbs                   
                

 

 2018                                        Blood Pressure 1: 130/70 Code
: 8480-6                                                          BMI: 26.9 Code
: 03014-1                                                          Heart Rate 1
: 83 bpm                                                          Height: 6'2" 
                                                         SpO2: 98%             
                                             Weight: 212 lbs                   
                

 

 2017                                        Blood Pressure 1: 122/74 Code
: 8480-6                                                          BMI: 28.1 Code
: 46244-0                                                          Heart Rate 1
: 88 bpm                                                          Height: 6'2" 
                                                         SpO2: 99%             
                                             Weight: 222 lbs                   
                









 05/15/2017                                        Blood Pressure 1: 128/80 Code
: 8480-6                                                          BMI: 29.4 Code
: 10449-0                                                          Heart Rate 1
: 92 bpm                                                          Height: 6'2" 
                                                         SpO2: 98%             
                                             Temperature: 36.6 (C) / 97.8 (F)  
                                                        Weight: 232 lbs        
                           









 2017                                        Blood Pressure 1: 124/78 Code
: 8480-6                                                          BMI: 29.4 Code
: 30704-6                                                          Heart Rate 1
: 78 bpm                                                          Height: 6'2" 
                                                         SpO2: 96%             
                                             Weight: 232 lbs                   
                

 

 2017                                        Blood Pressure 1: 128/82 Code
: 8480-6                                                          BMI: 31.0 Code
: 69561-5                                                          Heart Rate 1
: 80 bpm                                                          Height: 6'2" 
                                                         SpO2: 98%             
                                             Weight: 245 lbs                   
                

 

 06/15/2016                                        Blood Pressure 1: 126/74 Code
: 8480-6                                                          BMI: 29.9 Code
: 71370-0                                                          Heart Rate 1
: 71 bpm                                                          Height: 6'2" 
                                                         SpO2: 98%             
                                             Weight: 236 lbs                   
                









 2015                                        Blood Pressure 1: 102/74 Code
: 8480-6                                                          BMI: 30.4 Code
: 24573-1                                                          Heart Rate 1
: 76 bpm                                                          Height: 6'2" 
                                                         Weight: 240 lbs       
                            









 2013                                        Blood Pressure 1: 122/82 Code
: 8480-6                                                          BMI: 32.8 Code
: 61128-2                                                          Heart Rate 1
: 72 bpm                                                          Height: 6'   
                                                       Respiratory Rate: 16 bpm
                                                          Weight: 245 lbs      
                             









 2013                                        Blood Pressure 1: 126/74 Code
: 8480-6                                                          BMI: 31.6 Code
: 24655-8                                                          Heart Rate 1
: 80 bpm                                                          Height: 6'2" 
                                                         Weight: 246 lbs       
                            

 

 2012                                        Blood Pressure 1: 126/74 Code
: 8480-6                                                          Heart Rate 1: 
72 bpm                                                          Respiratory Rate
: 16 bpm                                                          Temperature: 
36.8 (C) / 98.3 (F)                                                          
Weight: 240 lbs                                   

 

 2012                                        Blood Pressure 1: 130/80 Code
: 8480-6                                                          BMI: 30.1 Code
: 77972-2                                                          Heart Rate 1
: 62 bpm                                                          Height: 6'2" 
                                                         Weight: 237 lbs 8 oz  
                                









 2012                                        Blood Pressure 1: 140/72 Code
: 8480-6                                                          BMI: 32.1 Code
: 69139-9                                                          Heart Rate 1
: 68 bpm                                                          Height: 6'   
                                                       Respiratory Rate: 16 bpm
                                                          Weight: 237 lbs      
                             



                                                                               
                                                                               
                                                  



Functional Status

          No Functional Status data                                            
              



History of Present Illness

                      





 Symptom Name                    Status                    Result              
      Effective Date                    Notes                

 

 nausea                    Frequency of Episodes                    unchanged  
                  2018                    None                

 

 weight loss                    Onset and Resolution                    ongoing
                    2018                    None                

 

 dizziness                    Quality                    constant              
      2018                    None                

 

 dizziness                    Quality                    acute                 
   2018                    None                

 

 dizziness                    Onset and Resolution                    sudden in 
onset                    2018                    None                

 

 dizziness                    Onset of Symptom                    5 days ago   
                 2018                    None                

 

 dizziness                    Frequency of Episodes                    daily   
                 2018                    None                

 

 dizziness                    Triggers                    no known associated 
factors                    2018                    None                

 

 dizziness                    Pertinent Findings                    nausea     
               2018                    None                

 

 wrist pain                    Location                    on the left         
           2017                    None                

 

 wrist pain                    Quality                    constant             
       2017                    None                

 

 wrist pain                    Onset and Resolution                    sudden 
in onset                    2017                    None                

 

 wrist pain                    Onset of Symptom                    24 hours ago
                    2017                    None                

 

 cough                    Location                    in the lung              
      05/15/2017                    None                

 

 cough                    Quality                    constant                  
  05/15/2017                    None                

 

 cough                    Quality                    hacking                    
05/15/2017                    None                

 

 cough                    Quality                    productive                
    05/15/2017                    None                

 

 cough                    Onset and Resolution                    sudden in 
onset                    05/15/2017                    None                

 

 cough                    Onset of Symptom                    2 weeks ago      
              05/15/2017                    None                

 

 cough                    Frequency of Episodes                    daily       
             05/15/2017                    None                

 

 cough                    Pertinent Findings                    Denies chest 
discomfort                    05/15/2017                    None                

 

 cough                    Pertinent Findings                    hoarseness     
               05/15/2017                    None                

 

 cough                    Pertinent Findings                    nasal 
congestion                    05/15/2017                    None                

 

 cough                    Pertinent Findings                    sputum 
production                    05/15/2017                    None                

 

 sinus congestion                    Location                    frontal 
sinuses                    05/15/2017                    None                

 

 sinus congestion                    Quality                    pressure       
             05/15/2017                    None                

 

 sinus congestion                    Onset and Resolution                    
sudden in onset                    05/15/2017                    None          
      

 

 sinus congestion                    Onset of Symptom                    2 
weeks ago                    05/15/2017                    None                

 

 sinus congestion                    Frequency of Episodes                    
daily                    05/15/2017                    None                

 

 sinus congestion                    Pertinent Findings                    
cough                    05/15/2017                    None                

 

 sinus congestion                    Pertinent Findings                    
hoarseness                    05/15/2017                    None                

 

 dizziness                    Quality                    acute                 
   2017                    None                

 

 dizziness                    Onset and Resolution                    sudden in 
onset                    2017                    None                

 

 dizziness                    Limitation on Activities                    
moderately limits activities                    2017                    
None                

 

 dizziness                    Triggers                    head turning         
           2017                    None                

 

 dizziness                    Pertinent Findings                    blurred 
vision                    2017                    None                

 

 dizziness                    Pertinent Findings                    
lightheadedness                    2017                    None          
      

 

 dizziness                    Exacerbating Factors                    turning 
the head                    2017                    None                

 

 dizziness                    Exacerbating Factors                    
medication                    2017                    meloxicam          
      

 

 dizziness                    Onset of Symptom                    5 days ago   
                 2017                    None                

 

 dizziness                    Frequency of Episodes                    
decreasing                    2017                    None                

 

 wrist pain                    Location                    on the right        
            2017                    None                

 

 wrist pain                    Quality                    acute                
    2017                    None                

 

 wrist pain                    Limitation on Activities                    
moderately limits activities                    2017                    
None                

 

 wrist pain                    Pertinent Findings                    Denies 
numbness                    2017                    None                

 

 wrist pain                    Pertinent Findings                    redness   
                 2017                    None                

 

 wrist pain                    Pertinent Findings                    stiffness 
                   2017                    None                

 

 wrist pain                    Pertinent Findings                    swelling  
                  2017                    None                

 

 ankle pain                    Location                    on the right        
            06/15/2016                    None                

 

 ankle pain                    Quality                    dull pain            
        06/15/2016                    None                

 

 ankle pain                    Quality                    aching               
     06/15/2016                    None                

 

 ankle pain                    Location                    achilles tendon     
               06/15/2016                    None                

 

 ankle pain                    Onset and Resolution                    sudden 
in onset                    06/15/2016                    None                

 

 ankle pain                    Onset of Symptom                    2 weeks ago 
                   06/15/2016                    None                

 

 ankle pain                    Frequency of Episodes                    daily  
                  06/15/2016                    None                

 

 ankle pain                    Sports Participation                    
basketball                    06/15/2016                    None                

 

 ankle pain                    Sports Participation                    baseball
                    06/15/2016                    None                

 

 ankle pain                    Sports Participation                    running 
                   06/15/2016                    None                

 

 ankle pain                    Pertinent Findings                    stiffness 
                   06/15/2016                    None                

 

 ankle pain                    Pertinent Findings                    pain with 
movement                    06/15/2016                    None                

 

 well man exam (18-39 years)                    Birth Control                  
  male condom                    06/15/2016                    None            
    

 

 well man exam (18-39 years)                    Nutrition and Exercise         
           normal weight                    06/15/2016                    None 
               

 

 well man exam (18-39 years)                    Nutrition and Exercise         
           balanced nutrition                    06/15/2016                    
None                

 

 well man exam (18-39 years)                    Nutrition and Exercise         
           regular diet                    06/15/2016                    None  
              

 

 well man exam (18-39 years)                    Nutrition and Exercise         
           moderate exercise                    06/15/2016                    
None                

 

 skin lesion                    Onset of Symptom                    _ years ago
                    2015                    None                

 

 skin lesion                    Quality                    flat                
    2015                    Denies pain.                  

 

 well man exam (18-39 years)                    Sexual Activity                
    experiences sexual satisfaction                    2015              
      None                

 

 well man exam (18-39 years)                    Sexual Activity                
    is monogamous                    2015                    None        
        

 

 well man exam (18-39 years)                    Birth Control                  
  regular use                    2015                    None            
    

 

 well man exam (18-39 years)                    Lifestyle                    
family supportive of relationship                    2015                
    None                

 

 well man exam (18-39 years)                    Lifestyle                    
normal amount of stress                    2015                    None  
              

 

 well man exam (18-39 years)                    Lifestyle                    
normal sleep patterns                    2015                    None    
            

 

 well man exam (18-39 years)                    Lifestyle                    
regular seatbelt use                    2015                    None     
           

 

 well man exam (18-39 years)                    Lifestyle                    
satisfactory marriage/partner relationship                    2015       
             None                

 

 well man exam (18-39 years)                    Lifestyle                    
satisfactory work experience                    2015                    
None                

 

 well man exam (18-39 years)                    Nutrition and Exercise         
           balanced nutrition                    2015                    
None                

 

 well man exam (18-39 years)                    Nutrition and Exercise         
           moderate exercise                    2015                    
None                

 

 well man exam (18-39 years)                    Reproductive System Development
                    normal puberty                    2015               
     None                

 

 well man exam (18-39 years)                    Health Guidance                
    cholesterol level and lipid panel                    2015            
        None                

 

 well man exam (18-39 years)                    Health Guidance                
    regular exercise                    2015                    None     
           

 

 well man exam (18-39 years)                    Health Guidance                
    safety belt use                    2015                    None      
          

 

 well man exam (18-39 years)                    Health Guidance                
    tobacco, drugs and alcohol avoidance                    2015         
           None                

 

 ankle pain                    Location                    on the right        
            2013                    None                

 

 ankle pain                    Quality                    sharp pain           
         2013                    alternating dull pain.  states it is 
worse after sitting and getting up and walking agin                

 

 ankle pain                    Pertinent Findings                    Denies 
catching                    2013                    None                

 

 ankle pain                    Pertinent Findings                    Denies 
clicking                    2013                    None                

 

 ankle pain                    Pertinent Findings                    Denies 
numbness                    2013                    None                

 

 ankle pain                    Pertinent Findings                    Denies 
redness                    2013                    None                

 

 ankle pain                    Pertinent Findings                    stiffness 
                   2013                    None                

 

 ankle pain                    Pertinent Findings                    Denies 
swelling                    2013                    None                

 

 ankle pain                    Onset of Symptom                    2-3 weeks 
ago                    2013                    states he thinks he 
originally injured it while playing football with friends last fall            
    

 

 ankle pain                    Severity                    mild                
    2013                    None                

 

 ankle pain                    Significant Medical Conditions                  
  degenerative joint disease                    2013                    
None                

 

 skin lesion                    Quality                    chronic             
       2013                    None                

 

 skin lesion                    Quality                    flat                
    2013                    None                

 

 skin lesion                    Quality                    non-tender          
          2013                    None                

 

 skin lesion                    Quality                    worsening           
         2013                    None                

 

 skin lesion                    Location                    left arm           
         2013                    also on hands bilat.                

 

 skin lesion                    Onset and Resolution                    ongoing
                    2013                    None                

 

 skin lesion                    Pertinent Findings                    Denies 
cough                    2013                    None                

 

 skin lesion                    Pertinent Findings                    Denies 
ecchymotic                    2013                    None                

 

 skin lesion                    Alleviating Factors                    
medication                    2013                    had ointment for 
lesions that helps temporarily                

 

 skin lesion                    Severity                    moderate           
         2013                    None                

 

 skin lesion                    Frequency of Episodes                    
unchanged                    2013                    None                

 

 skin lesion                    Triggers                    no known associated 
factors                    2013                    None                

 

 headache                    Onset and Resolution                    sudden in 
onset                    2012                    None                

 

 headache                    Onset of Symptom                    3 days ago    
                2012                    None                

 

 sore throat                    Quality                    acute               
     2012                    None                

 

 sore throat                    Pertinent Findings                    cough    
                2012                    None                

 

 rash                    Location-Major                    on the hands        
            2012                    on right hand                

 

 rash                    Onset of Symptom                    1 month ago       
             2012                    recurrence of former rash, using 
itrakonazole with no results                

 

 headache                    Location                    diffusely             
       2012                    None                

 

 headache                    Quality                    acute                  
  2012                    None                

 

 headache                    Onset and Resolution                    ongoing   
                 2012                    None                

 

 headache                    Limitation on Activities                    does 
not limit activities                    2012                    None     
           

 

 headache                    Frequency of Episodes                    
increasing                    2012                    None                

 

 headache                    Significant Medical Conditions                    
allergic rhinitis                    2012                    None        
        

 

 headache                    Triggers                    no known associated 
factors                    2012                    None                

 

 sore throat                    Location                    diffusely          
          2012                    None                

 

 sore throat                    Onset and Resolution                    ongoing
                    2012                    None                

 

 sore throat                    Limitation on Activities                    
does not limit oral intake                    2012                    
None                

 

 sore throat                    Significant Medical Conditions                 
   allergic rhinitis                    2012                    None     
           

 

 sore throat                    Triggers                    no known associated 
factors                    2012                    None                

 

 flare up of rash                    Alleviating Factors                    no 
alleviating factors                    2012                    None      
          

 

 flare up of rash                    Pertinent Findings                    
history of similar rash                    2012                    None  
              

 

 flare up of rash                    Pertinent Findings                    
itching                    2012                    None                

 

 flare up of rash                    Pertinent Findings                    
tenderness                    2012                    None                

 

 rash                    Location-Major                    on the arms         
           2012                    None                

 

 rash                    Quality                    expanding                  
  2012                    None                

 

 rash                    Color                    red                    2012                    None                

 

 flare up of rash                    Quality                    dry            
        2012                    None                

 

 flare up of rash                    Quality                    flaking        
            2012                    None                

 

 flare up of rash                    Onset and Resolution                    
gradual in onset                    2012                    None         
       

 

 flare up of rash                    Limitation on Activities                  
  does not limit activities                    2012                    
None                

 

 flare up of rash                    Severity                    mild          
          2012                    None                

 

 flare up of rash                    Severity                    worsening     
               2012                    None                

 

 flare up of rash                    Triggers                    no known 
triggers                    2012                    None                

 

 flare up of rash                    Location-Extremities                    on 
the right hand                    2012                    None           
     

 

 flare up of rash                    Quality                    dry            
        2012                    None                

 

 flare up of rash                    Quality                    flaking        
            2012                    None                

 

 flare up of rash                    Onset of Symptom                    3 
months ago                    2012                    None                

 

 flare up of rash                    Color                    black            
        2012                    None                

 

 flare up of rash                    Color                    erythematous     
               2012                    None                

 

 flare up of rash                    Onset and Resolution                    
gradual in onset                    2012                    None         
       

 

 flare up of rash                    Limitation on Activities                  
  does not limit activities                    2012                    
None                

 

 flare up of rash                    Severity                    mild          
          2012                    None                

 

 flare up of rash                    Severity                    worsening     
               2012                    None                

 

 flare up of rash                    Prior Treatments                    
partially responsive to treatment                    2012                
    None                

 

 flare up of rash                    Triggers                    no known 
triggers                    2012                    None                

 

 flare up of rash                    Alleviating Factors                    no 
alleviating factors                    2012                    None      
          

 

 flare up of rash                    Pertinent Findings                    
history of similar rash                    2012                    None  
              

 

 flare up of rash                    Pertinent Findings                    
itching                    2012                    None                

 

 flare up of rash                    Pertinent Findings                    
tenderness                    2012                    None                



                                                                    



Advance Directives

          No Advance Directive data                                            
                        



Encounters

                      





 Encounter                    Performer                    Location            
        Codes                    Date                

 

                     [id number] EST. PATIENT, LEVEL III

Diagnosis: Gastro-esophageal reflux disease without esophagitis[ICD10: K21.9]

Diagnosis: Abnormal weight loss[ICD10: R63.4]

Diagnosis: Other malaise[ICD10: R53.81]                                      
Franca Landrum MD, [facility]                    CPT-4
: 14666                    2018                

 

                     29226 EST. PATIENT, LEVEL IV

Diagnosis: Abnormal weight loss[ICD10: R63.4]

Diagnosis: Benign paroxysmal vertigo, bilateral[ICD10: H81.13]

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

Diagnosis: Other malaise[ICD10: R53.81]

Diagnosis: Other fatigue[ICD10: R53.83]                                      
Franca Landrum MD, [facility]                    CPT-4
: 97505                    2018                

 

                     93741 EST. PATIENT, LEVEL III

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                                   
   Franca Landrum MD, [facility]                    CPT
-4: 94781                    2017                

 

                     94568 EST. PATIENT, LEVEL IV

Diagnosis: Acute bronchitis due to other specified organisms[ICD10: J20.8]

Diagnosis: Other allergic rhinitis[ICD10: J30.89]                              
        [facility]                 
   CPT-4: 84457                    05/15/2017                

 

                     [id number]) 63573 EST. PATIENT, LEVEL III

Diagnosis: Benign paroxysmal vertigo, bilateral[ICD10: H81.13]                 
                     Klarissa Landrum MD, [facility]
                    CPT-4: 75228                    2017                

 

                     39143 EST. PATIENT, LEVEL III

Diagnosis: Pain in right wrist[ICD10: M25.531]                                 
     [facility]                    
CPT-4: 04897                    2017                

 

                     ([id number]) PREV VISIT EST AGE 18-39

Diagnosis: Encounter for general adult medical examination without abnormal 
findings[ICD10: Z00.00]                                      [facility]                    CPT-4: 80211         
           06/15/2016                

 

                     ([id number]) PREV VISIT EST AGE 18-39

Diagnosis: PREVENTIVE PHYSICAL EXAM[ICD9: V70.0]                               
       Meghan Landrum MD, [facility]                
    CPT-4: 77504                    2015                

 

                     [id number]) 83126 EST. PATIENT, LEVEL III

Diagnosis: ACHILLES TENDINITIS[ICD9: 726.71]                                   
   Meghan Landrum MD, [facility]                    
CPT-4: 46082                    2013                

 

                     [id number]) 95454 EST. PATIENT, LEVEL III

Diagnosis: Rash[ICD9: 782.1]

Diagnosis: PRURITIC DISORDER[ICD9: 698.9]                                      
Klarissa Landrum MD, [facility]                    
CPT-4: 80146                    2013                

 

                     ([id number]) 45293 EST. PATIENT, LEVEL III

Diagnosis: ACUTE URI[ICD9: 465.9]

Diagnosis: Rash[ICD9: 782.1]                                      Klarissa Landrum MD, [facility]                    CPT-4: 
17133                    2012                

 

                     [id number]) 61225 EST. PATIENT, LEVEL III

Diagnosis: PRURITIC DISORDER[ICD9: 698.9]

Diagnosis: NONSPECIF SKIN ERUPT NEC[ICD9: 782.1]                               
       Meghan Landrum MD, [facility]                
    CPT-4: 04605                    2012                

 

                     70180 EST. PATIENT, LEVEL IV

Diagnosis: Rash[ICD9: 782.1]

Diagnosis: Localized pruritus[ICD9: 698.9]

Diagnosis: Elevated blood pressure[ICD9: 796.2]                                
      [facility]                 
   CPT-4: 69834                    2012                



                                                                               
                                                                               
                                                                                



Plan of Care

                      





 Planned Activity                    Notes                    Codes            
        Status                    Date                

 

 Visit Plan:                     Malaise, weight loss - labs OK - pt is to 
follow with his RA specialist - will start on PPI, if symptoms do not improve 
will consider CT or referral to GI specialist Esophageal Reflux - the patient 
has been counseled against excessive intake of caffeine, spicy foods, peppermint
, and cinnamon - all of which can exacerbate esophageal reflux. The patient is 
to take medications as prescribed and call the office if the symptoms are not 
improving.

                                                             2018        
        

 

 Appointment: Franca Casper 

WPtel:[phone]

 [facility]                                                             (30 min) Complex
                    2018                

 

 Patient Education: Patient Medication Summary                                 
                            Completed                    2018            
    

 

 Visit Plan:                     BPPV - Benign Paroxysmal Positional Vertigo - 
discussed diagnosis with the patient, offered the pt the appropriate additional 
information in hand-out. Pt instructed in home exercises to help alleviate and 
prevent future recurrent episodes of vertigo. Pt informed that if symptoms 
worsen, call the office for further instructions/medication interventions. RA - 
Pt is to follow with specialist at  - pt is to notify clinic of any changes 
in current treatment plan or with any acute questions or concerns. Weight loss 
- will check labs and treat as indicated.

                                                             2018        
        

 

 Appointment: Franca Casper 

WPtel:[phone] [address] [facility]

                                                             (15 min) 
Moderate                    2018                

 

 Patient Education: Patient Medication Summary                                 
                            Completed                    2018            
    

 

 Visit Plan:                     RA - acute flare - will send RX - pt has an 
appointment with a new rheumatologist in January. Pt is to notify clinic if 
symptoms do not improve, if they worsen, or with any acute changes, questions, 
or concerns.

                                                             2017        
        

 

 Visit Plan:                     RA - acute flare - will send RX - pt has an 
appointment with a new rheumatologist in January. Pt is to notify clinic if 
symptoms do not improve, if they worsen, or with any acute changes, questions, 
or concerns.

                                                             2017        
        

 

 Appointment: Franca Casper 

WPtel:[phone]

 [facility]

[place]                                                             (30 min) Complex
                    2017                

 

 Patient Education: Patient Medication Summary                                 
                            Completed                    2017            
    

 

 Visit Plan:                     Bronchitis - acute case of bronchitis 
identified. Pt has been given antibiotics, steroid as appropriate, and pt has 
been instructed to call if symptoms are not improved, or if symptoms acutely 
worsen. Allergies - chronic - recommended pt to use allergy medication as 
prescribed. Pt has been counseled as to the appropriate use of the medication. 
Pt to call if allergy symptoms are not controlled with the medication. If using 
nasal spray, instructions as follows: Nasal spray- use twice daily, one spray 
per nostril twice daily, after 30 minutes, rinse out nose with saline spray.. 
Use opposite hand per nostril to spray in the nasal steroid allergy spray.

                                                             05/15/2017        
        

 

 Visit Plan:                     Bronchitis - acute case of bronchitis 
identified. Pt has been given antibiotics, steroid as appropriate, and pt has 
been instructed to call if symptoms are not improved, or if symptoms acutely 
worsen. Allergies - chronic - recommended pt to use allergy medication as 
prescribed. Pt has been counseled as to the appropriate use of the medication. 
Pt to call if allergy symptoms are not controlled with the medication. If using 
nasal spray, instructions as follows: Nasal spray- use twice daily, one spray 
per nostril twice daily, after 30 minutes, rinse out nose with saline spray.. 
Use opposite hand per nostril to spray in the nasal steroid allergy spray.

                                                             05/15/2017        
        

 

 Appointment: Franca Casper 

WPtel:[phone]

 [facility]

                                                             (15 min) 
Moderate                    05/15/2017                

 

 Patient Education: Patient Medication Summary                                 
                            Completed                    05/15/2017            
    

 

 Visit Plan:                     BPPV - Benign Paroxysmal Positional Vertigo - 
discussed diagnosis with the patient, offered the pt the appropriate additional 
information in hand-out. Pt instructed in home exercises to help alleviate and 
prevent future recurrent episodes of vertigo. Pt informed that if symptoms 
worsen, call the office for further instructions/medication interventions.

                                                             2017        
        

 

 Appointment: Klarissa Roberts 

WPtel:[phone]

 [facility] [facility]

                                                             (30 min) Complex
                    2017                

 

 Patient Education: Patient Medication Summary                                 
                            Completed                    2017            
    

 

 Patient Education: .Amazing charts Paroxysmal positional vertigo              
                                               Completed                    2017                

 

 Patient Education: Obesity                                                    
         Completed                    2017                

 

 Visit Plan:                     Right wrist pain - The pt is to use prn 
antiinflammatories to manage acute pain. The patient is to call the office if 
the pain is worsening or does not improve. Intermittent and varying joint pain 
- ongoing for the past 5-6 years - will check labs

                                                             2017        
        

 

 Appointment: Franca Casper 

WPtel:[phone]

 [facility] [facility]

                                                             (10 min) Simple 
                   2017                

 

 Patient Education: Patient Medication Summary                                 
                            Completed                    2017            
    

 

 Visit Plan:                     Well Adult - pt was counseled about diet, 
exercise, and encouraged to follow a heart healthy diet and increase activity 
level. The patient was instructed to RTC yearly for well adult exams and PRN 
for acute illnesses. The pt was also instructed to have yearly labs for check 
of cholesterol, thyroid, chem panel, CBC, and renal functioning.

                                                             06/15/2016        
        

 

 Appointment: Meghan Landrum 

WPtel:[phone]

 [facility]

                                                             (15 min) 
Moderate                    06/15/2016                

 

 Patient Education: Patient Medication Summary                                 
                            Completed                    06/15/2016            
    

 

 Patient Education: Obesity                                                    
         Completed                    06/15/2016                

 

 Visit Plan:                     Well Adult - pt was counseled about diet, 
exercise, and encouraged to follow a heart healthy diet and increase activity 
level. The patient was instructed to RTC yearly for well adult exams and PRN 
for acute illnesses. The pt was also instructed to have yearly labs for check 
of cholesterol, thyroid, chem panel, CBC, and renal functioning.

                                                             2015        
        

 

 Appointment: Meghan Landrum 

WPtel:[phone]

 [facility]

                                                             (15 min) 
Moderate                    2015                

 

 Patient Education: Patient Medication Summary                                 
                            Completed                    2015            
    

 

 Visit Plan:                     Achilles tendonitis - uncontrolled symptoms- 
recommend pt to take antiinflammatory as directed for pain control. Use tylenol 
for break through pain symptoms.

                                                             2013        
        

 

 Appointment: Meghan Landrum 

WPtel:[phone]

 [facility]

                                                             Follow up       
             2013                

 

 Patient Education: Patient Medication Summary                                 
                            Completed                    2013            
    

 

 Patient Education: Achilles Tendon Injury Exercises                           
                                  Completed                    2013      
          

 

 Patient Education: Achilles Tendon Injury Exercises: Illustration             
                                                Completed                                    

 

 Visit Plan:                     Rash-chronic--plan to treat with short course 
of prednisone and monitor symptoms. Patient is to call if rash does not resolve 
completely or if any worse. Finish anti-fungal medication as well. Patient 
verbalized understanding.

                                                             2013        
        

 

 Appointment: Klarissa Roberts 

WPtel:[phone]

 [address] [place] [address]                                                             Other           
         2013                

 

 Patient Education: Patient Medication Summary                                 
                            Completed                    2013            
    

 

 Visit Plan:                     Rash-culture done today in the office-plan to 
treat with short course of prednisone and monitor symptoms. Patient is to call 
if rash does not resolve completely or if any worse. Finish anti-fungal 
medication as well. Patient verbalized understanding. URI - Pt advised to 
increase fluids, vitamin C. Discussed natural and expected course of this 
diagnosis and need to alert me if symtpoms do not follow expected course, or if 
any worse. RX sent to patient's pharmacy.Rocephin and kenalog injection today 
in the office.

                                                             2012        
        

 

 Appointment: Klarissa Roberts 

WPtel:[phone]

 [address] [place] [address]                                                             Other           
         2012                

 

 Patient Education: Patient Medication Summary                                 
                            Completed                    2012            
    

 

 Visit Plan:                     Rash - Itching - uncertain eitiology - but 
with his response to the steroid and antifungal and the diffuse look of a 
folliculitis - I will treat Jose with a different antifungal and steroid and 
follow his progress. itraconazole 100 mg x 20 days prednisone taper

                                                             2012        
        

 

 Appointment: Meghan Landrum 

WPtel:[phone]

 [address] [place] [address]                                                             Other           
         2012                

 

 Patient Education: Patient Medication Summary                                 
                            Completed                    2012            
    

 

 Visit Plan:                     Rash -uncertain eitiology - unable to get 
sample for reliable culture- will empirically treat with prednisone taper and 
diflucan x 7 days. Pt is to call if the rash does not improve or if it worsens. 
Elevated blood pressure - check blood pressure at home, cut back on salty foods
, call in blood pressure in one week.

                                                             2012        
        

 

 Appointment: Meghan Landrum 

WPtel:[phone]

 [address] [place] [address]                                                             Other           
         2012                

 

 Patient Education: Patient Medication Summary                                 
                            Completed                    2012            
    



                                                                               
                                                                               
                                                                               
                                                                               
                                                                               
             



Instructions

                      





 Comment                

 

                     . Rash -uncertain eitiology - unable to get sample for 
reliable culture- will empirically treat with prednisone taper and diflucan x 7 
days.

Pt is to call if the rash does not improve or if it worsens.



Elevated blood pressure - check blood pressure at home, cut back on salty foods
, call in blood pressure in one week.                                  

 

                     . RA - acute flare - will send RX - pt has an appointment 
with a new rheumatologist in January.  Pt is to notify clinic if symptoms do 
not improve, if they worsen, or with any acute changes, questions, or concerns.
                                   

 

                     . RA - acute flare - will send RX - pt has an appointment 
with a new rheumatologist in January.  Pt is to notify clinic if symptoms do 
not improve, if they worsen, or with any acute changes, questions, or concerns.
                                   

 

                     TREAT WITH PENNSAID 10 TO 15 DROPS FOUR TIMES DAILY X 2 
WEEKS.

 . Achilles tendonitis -  uncontrolled symptoms- recommend pt to take 
antiinflammatory as directed for pain control.



Use tylenol for break through pain symptoms.                                  

 

                     . Rash - Itching - uncertain eitiology - but with his 
response to the steroid and antifungal and the diffuse look of a folliculitis - 
I will treat Jose with a different antifungal and steroid and follow his 
progress.



itraconazole 100 mg x 20 days

prednisone taper                                  

 

                     . Malaise, weight loss - labs OK - pt is to follow with 
his RA specialist - will start on PPI, if symptoms do not improve will consider 
CT or referral to GI specialist



Esophageal Reflux - the patient has been counseled against excessive intake of 
caffeine, spicy foods, peppermint, and cinnamon - all of which can exacerbate 
esophageal reflux.

The patient is to take medications as prescribed and call the office if the 
symptoms are not improving.

                                  

 

                     . Rash-culture done today in the office-plan to treat with 
short course of prednisone and monitor symptoms. Patient is to call if rash 
does not resolve completely or if any worse. Finish anti-fungal medication as 
well. Patient verbalized understanding. 



 URI - Pt advised to increase fluids, vitamin C.  Discussed natural and 
expected course of this diagnosis and need to alert me if symtpoms do not 
follow expected course, or if any worse. RX sent to patient's pharmacy.Rocephin 
and kenalog injection today in the office.                                   

 

                     . BPPV - Benign Paroxysmal Positional Vertigo - discussed 
diagnosis with the patient, offered the pt the appropriate additional 
information in hand-out.  Pt instructed in home exercises to help alleviate and 
prevent future recurrent episodes of vertigo.  Pt informed that if symptoms 
worsen, call the office for further instructions/medication interventions.



RA - Pt is to follow with specialist at  - pt is to notify clinic of any 
changes in current treatment plan or with any acute questions or concerns. 



Weight loss - will check labs and treat as indicated.                          
        

 

                     . Well Adult - pt was counseled about diet, exercise, and 
encouraged to follow a heart healthy diet and increase activity level.  The 
patient was instructed to RTC yearly for well adult exams and PRN for acute 
illnesses.  The pt was also instructed to have yearly labs for check of 
cholesterol, thyroid, chem panel, CBC, and renal functioning.

                                  

 

                     . BPPV - Benign Paroxysmal Positional Vertigo - discussed 
diagnosis with the patient, offered the pt the appropriate additional 
information in hand-out.  Pt instructed in home exercises to help alleviate and 
prevent future recurrent episodes of vertigo.  Pt informed that if symptoms 
worsen, call the office for further instructions/medication interventions.

                                  

 

                     . Right wrist pain - The pt is to use prn 
antiinflammatories to manage acute pain. The patient is to call the office if 
the pain is worsening or does not improve. 



Intermittent and varying joint pain - ongoing for the past 5-6 years - will 
check labs                                  

 

                     steroid shot today 



prednisone 40mg daily x 2 more days, then 20mg daily x 2 days, then stop - let 
me know if your symptoms are not improving



Continue doxycycline for now - will check chest x-ray - if needed will change 
antibiotics. 



[facility] allergy medicine - I will send as a prescription, if it is cheaper over 
the counter get the over the counter (they are the same medicine and dose)



Let me know if you are not getting better.. Bronchitis - acute case of 
bronchitis identified.  Pt has been given antibiotics, steroid as appropriate, 
and pt has been instructed to call if symptoms are not improved, or if symptoms 
acutely worsen.



Allergies - chronic - recommended pt to use allergy medication as prescribed.  
Pt has been counseled as  to the appropriate use of the medication.  Pt to call 
if allergy symptoms are not controlled with the medication.

If using nasal spray, instructions as follows: Nasal spray- use twice daily, 
one spray per nostril twice daily, after 30 minutes, rinse out nose with saline 
spray.. Use opposite hand per nostril to spray in the nasal steroid allergy 
spray.

                                  

 

                     steroid shot today 



prednisone 40mg daily x 2 more days, then 20mg daily x 2 days, then stop - let 
me know if your symptoms are not improving



Continue doxycycline for now - will check chest x-ray - if needed will change 
antibiotics. 



[facility] allergy medicine - I will send as a prescription, if it is cheaper over 
the counter get the over the counter (they are the same medicine and dose)



Let me know if you are not getting better.. Bronchitis - acute case of 
bronchitis identified.  Pt has been given antibiotics, steroid as appropriate, 
and pt has been instructed to call if symptoms are not improved, or if symptoms 
acutely worsen.



Allergies - chronic - recommended pt to use allergy medication as prescribed.  
Pt has been counseled as  to the appropriate use of the medication.  Pt to call 
if allergy symptoms are not controlled with the medication.

If using nasal spray, instructions as follows: Nasal spray- use twice daily, 
one spray per nostril twice daily, after 30 minutes, rinse out nose with saline 
spray.. Use opposite hand per nostril to spray in the nasal steroid allergy 
spray.

                                  

 

                     . Rash-chronic--plan to treat with short course of 
prednisone and monitor symptoms. Patient is to call if rash does not resolve 
completely or if any worse. Finish anti-fungal medication as well. Patient 
verbalized understanding. 



                                  

 

                     retinol or cortisone on the dark skin lesion on back - 

the site on the back of your left calf is called a dermatofibroma is and benign.

 . Well Adult - pt was counseled about diet, exercise, and encouraged to follow 
a heart healthy diet and increase activity level.  The patient was instructed 
to RTC yearly for well adult exams and PRN for acute illnesses.  The pt was 
also instructed to have yearly labs for check of cholesterol, thyroid, chem 
panel, CBC, and renal functioning.

## 2018-03-05 NOTE — XMS REPORT
CCD document using C-CDA

 Created on: 2018



Jose Jones

External Reference #: [id number]

: 1980

Sex: Male



Demographics







 Address  [address]

[place]  [address]

 

 Home Phone  [phone]

 

 Preferred Language  English

 

 Marital Status  

 

 Sikhism Affiliation  Unknown

 

 Race  Other Race

 

 Ethnic Group  Not  or 





Author







 Author  Meghan Landrum MD, [facility]

 

 Address  [address] [place]  [address]



 

 Phone  [phone]







Care Team Providers







 Care Team Member Name  Role  Phone

 

  PP  Unavailable

 

  CCM  Unavailable



                                            



Summary Purpose

          Interface Exchange                                                   
                 



Insurance Providers

                      





 Payer name                    Policy type / Coverage type                    
Covered party ID                    Effective Begin Date                    
Effective End Date                

 

 [facility]/[employer]    
                [id number]                    2017                    
Unknown                



                                                                              



Family history

                      



Mother            





 Diagnosis                    Age At Onset                

 

 No Family Disease Entered                    N/A                



            



Sister            





 Diagnosis                    Age At Onset                

 

 No Family Disease Entered                    N/A                



            



Brother            





 Diagnosis                    Age At Onset                

 

 No Family Disease Entered                    N/A                



            



Daughter            





 Diagnosis                    Age At Onset                

 

 No Family Disease Entered                    N/A                



            



Brother            





 Diagnosis                    Age At Onset                

 

 No Family Disease Entered                    N/A                



            



Brother            





 Diagnosis                    Age At Onset                

 

 No Family Disease Entered                    N/A                



            



Brother            





 Diagnosis                    Age At Onset                

 

 No Family Disease Entered                    N/A                



            



Father            





 Diagnosis                    Age At Onset                

 

 No Family Disease Entered                    N/A                



                                                                               
                                                                     



Social History

                      





 Social History Element                    Codes                    Description
                    Effective Dates                

 

 Number of children                    Unknown                    1            
        2013                

 

 Marital status                    Unknown                              
          2012                

 

 Living arrangements                    Unknown                    House       
             2012                

 

 Employment                    Unknown                    Currently employed

[employer]                    2012                



                                                                               
                                       



Allergies, Adverse Reactions, Alerts

          Allergies, Adverse Reactions, Alerts data not found                  
                                                  



Past Medical History

                      





 Illness                    Codes                    Condition Status          
          Onset Date                    Resolved Date                

 

                     Abnormal weight loss                                      
ICD-9: 783.21

ICD-10: R63.4                    Active                    2018          
          Unknown                

 

                     Benign paroxysmal vertigo, bilateral                      
                ICD-9: 386.11

ICD-10: H81.13                    Active                    2017         
           Unknown                

 

                     Other fatigue                                      ICD-9: 
780.79

ICD-10: R53.83                    Active                    2018         
           Unknown                

 

                     Other malaise                                      ICD-9: 
780.79

ICD-10: R53.81                    Active                    2018         
           Unknown                

 

                     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 
without organ or systems involvement                                      ICD-9
: 714.0

ICD-10: M05.732                    Active                    2017        
            Unknown                

 

                     Acute bronchitis due to other specified organisms         
                             ICD-9: 466.0

ICD-10: J20.8                    Active                    05/15/2017          
          Unknown                

 

                     Other allergic rhinitis                                   
   ICD-9: 477.8

ICD-10: J30.89                    Active                    05/15/2017         
           Unknown                

 

                     Pain in right wrist                                      
ICD-9: 719.43

ICD-10: M25.531                    Active                    2017        
            Unknown                

 

                     Encounter for general adult medical examination without 
abnormal findings                                      ICD-9: V70.0

ICD-10: Z00.00                    Active                    2015         
           Unknown                

 

                     PREVENTIVE PHYSICAL EXAM                                  
    ICD-9: V70.0                    Active                    2015       
             Unknown                

 

                     ACHILLES TENDINITIS                                      
ICD-9: 726.71                    Active                    2013          
          Unknown                

 

                     ACUTE URI                                      ICD-9: 
465.9                    Active                    2012                  
  Unknown                

 

                     Elevated blood pressure                                   
   ICD-9: 796.2                    Active                    2012        
            Unknown                

 

                     Localized pruritus                                      ICD
-9: 698.9                    Active                    2012              
      Unknown                

 

                     Rash                                      ICD-9: 782.1    
                Active                    2012                    Unknown
                



                                                                               
                                                                               
                                                                      



Problems

                      





 Condition                    Codes                    Effective Dates         
           Condition Status                

 

                     Abnormal weight loss                                      
ICD-9: 783.21

ICD-10: R63.4                    2018                    Active          
      

 

                     Benign paroxysmal vertigo, bilateral                      
                ICD-9: 386.11

ICD-10: H81.13                    2017                    Active         
       

 

                     Other fatigue                                      ICD-9: 
780.79

ICD-10: R53.83                    2018                    Active         
       

 

                     Other malaise                                      ICD-9: 
780.79

ICD-10: R53.81                    2018                    Active         
       

 

                     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 
without organ or systems involvement                                      ICD-9
: 714.0

ICD-10: M05.732                    2017                    Active        
        

 

                     Acute bronchitis due to other specified organisms         
                             ICD-9: 466.0

ICD-10: J20.8                    05/15/2017                    Active          
      

 

                     Other allergic rhinitis                                   
   ICD-9: 477.8

ICD-10: J30.89                    05/15/2017                    Active         
       

 

                     Pain in right wrist                                      
ICD-9: 719.43

ICD-10: M25.531                    2017                    Active        
        

 

                     Encounter for general adult medical examination without 
abnormal findings                                      ICD-9: V70.0

ICD-10: Z00.00                    2015                    Active         
       

 

                     PREVENTIVE PHYSICAL EXAM                                  
    ICD-9: V70.0                    2015                    Active       
         

 

                     ACHILLES TENDINITIS                                      
ICD-9: 726.71                    2013                    Active          
      

 

                     ACUTE URI                                      ICD-9: 
465.9                    2012                    Active                

 

                     Elevated blood pressure                                   
   ICD-9: 796.2                    2012                    Active        
        

 

                     Localized pruritus                                      ICD
-9: 698.9                    2012                    Active              
  

 

                     Rash                                      ICD-9: 782.1    
                2012                    Active                



                                                                               
                                                                               
                                                                      



Medications

                      





 Medication                    Codes                    Instructions           
         Start Date                    Stop Date                    Status     
               Fill Instructions                

 

                     meclizine 25 mg tablet                                    
  RxNorm: 246988                    1 Tablet(s) PO TID as needed               
     2018                    Active        
                            

 

                     Zofran ODT 4 mg disintegrating tablet                     
                 RxNorm: 564476                    1 Tablet(s) PO TID as needed
                    2018                    
Active                                    

 

                     clobetasol 0.05 % topical cream                           
           RxNorm: 749353                    APPLY TO AFFECTED AREAS TOP as 
needed FOR ECZEMA                    2017                    No Stop Date
                    Active                                    

 

                     Voltaren 1 % topical gel                                  
    RxNorm: 269453                    1 Application TOP QID as needed          
          2017                    No Stop Date                    Active 
                                   

 

                     prednisone 10 mg tablet                                   
   RxNorm: 741030                    Tablet(s) PO UD                    2018                    Inactive                 
   6,5,4,3,2,1                

 

                     Kenalog 40 mg/mL suspension for injection                 
                     RxNorm: 6559100                    Milliliter(s) Inj      
              2017                    
Inactive                                    

 

                     Zyrtec 10 mg tablet                                      
RxNorm: 4573017                    1 Tablet(s) PO daily                    05/15
/2017                    2017                    Inactive                
                    

 

                     Kenalog 40 mg/mL suspension for injection                 
                     RxNorm: 8351042                    1 Milliliter(s) Inj    
                05/15/2017                    05/15/2017                    
Inactive                                    

 

                     prednisone 20 mg tablet                                   
   RxNorm: 501201                    1 Tablet(s) PO daily                    05/
15/2017                    2018                    Inactive              
                      

 

                     clobetasol 0.05 % topical cream                           
           RxNorm: 830596                    APPLY TO AFFECTED AREAS TOP as 
needed FOR ECZEMA                    2016  
                  Inactive                                    

 

                     prednisone 10 mg tablets in a dose pack                   
                   RxNorm: 639113                    Tablet(s) PO              
      2013                    Inactive     
                               

 

                     Diflucan 150 mg tablet                                    
  RxNorm: 839626                    1 Tablet(s) PO daily                    2013                    Inactive              
                      

 

                     nystatin-triamcinolone 100,000 unit/gram-0.1 % Ointment   
                                   RxNorm: 2670747                    1 
Application TOP BID                    2012
                    Inactive                                    

 

                     nystatin-triamcinolone 100,000 unit/gram-0.1 % Ointment   
                                   RxNorm: 2656075                    1 
Application TOP BID                    2012
                    Inactive                                    

 

                     Bactrim  mg-160 mg tablet                           
           RxNorm: 310689                    1 Tablet(s) PO BID                
    2012                    Inactive       
                             

 

                     prednisone 10 mg tablets in a dose pack                   
                   RxNorm: 955590                    Tablet(s) PO UD 6-5-4-3-2-
1                    2012                  
  Inactive                                    

 

                     itraconazole 100 mg Cap                                   
   RxNorm: 994298                    1 Capsule(s) PO                    2013                    Inactive                 
                   

 

                     Kenalog 40 mg/mL Susp for Injection                       
               RxNorm: 4679222                    Milliliter(s) Inj            
        2012                    Inactive   
                                 

 

                     Kenalog 40 mg/mL Susp for Injection                       
               RxNorm: 4802219                    2 Milliliter(s) Inj          
          2012                    Inactive 
                                   

 

                     meloxicam 15 mg tablet                                    
  RxNorm: 079057                    1 Tablet(s) PO daily                    No 
Start Date                                         Active                      
              

 

                     itraconazole 100 mg Cap                                   
   RxNorm: 065352                    1 Capsule(s) PO                    No 
Start Date                    2012                    Inactive           
                         

 

                     clobetasol 0.05 % topical cream                           
           RxNorm: 093173                    APPLY TO AFFECTED AREAS TOP as 
needed FOR ECZEMA                    No Start Date                    02/10/
2016                    Inactive                                    



                                                                               
                                                                               
                                                                               
                                                   



Medication Administered

                      





 Medication                    Codes                    Instructions           
         Start Date                    Status                

 

 Kenalog 40 mg/mL suspension for injection                    RxNorm: 6367999  
                  Milliliter                    2017                    
No longer Active                

 

 Kenalog 40 mg/mL suspension for injection                    RxNorm: 2030753  
                  1Milliliter                    05/15/2017                    
No longer Active                

 

 Kenalog 40 mg/mL Susp for Injection                    RxNorm: 0033285        
            2Milliliter                    2012                    No 
longer Active                



                                                                               
                   



Immunizations

          No Immunization data                                                 
         



Assessments

                      





 Condition                    Codes                    Effective Dates         
       

 

 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 
systems involvement                    ICD-10: M05.732

ICD-9: 714.0                    2018                

 

 Other fatigue                    ICD-10: R53.83

ICD-9: 780.79                    2018                

 

 Abnormal weight loss                    ICD-10: R63.4

ICD-9: 783.21                    2018                

 

 Other malaise                    ICD-10: R53.81

ICD-9: 780.79                    2018                

 

 Benign paroxysmal vertigo, bilateral                    ICD-10: H81.13

ICD-9: 386.11                    2018                

 

 Acute bronchitis due to other specified organisms                    ICD-10: 
J20.8

ICD-9: 466.0                    05/15/2017                

 

 Other allergic rhinitis                    ICD-10: J30.89

ICD-9: 477.8                    05/15/2017                

 

 Pain in right wrist                    ICD-10: M25.531

ICD-9: 719.43                    2017                

 

 Encounter for general adult medical examination without abnormal findings     
               ICD-10: Z00.00

ICD-9: V70.0                    06/15/2016                

 

 PREVENTIVE PHYSICAL EXAM                    ICD-9: V70.0                                    

 

 ACHILLES TENDINITIS                    ICD-9: 726.71                    2013                

 

 PRURITIC DISORDER                    ICD-9: 698.9                    2013                

 

 Rash                    ICD-9: 782.1                    2013            
    

 

 ACUTE URI                    ICD-9: 465.9                    2012       
         

 

 Elevated blood pressure                    ICD-9: 796.2                                    



                                                                    



Reason For Visit

                      





 Reason For Visit                    Effective Dates                    Notes  
              

 

 dizziness                    2018                                    

 

 wrist pain                    2017                                    

 

 cough                    05/15/2017                                    

 

 dizziness                    2017                                    

 

 wrist pain                    2017                                    

 

 ankle pain                    06/15/2016                                    

 

 skin lesion                    2015                                    

 

 ankle pain                    2013                                    

 

 skin lesion                    2013                                    

 

 headache                    2012                                    

 

 rash                    2012                    started on patient's 
right hand and spread to the left.  he was taking diflucan and nystatin cream.  
Once that cleared, it started on both of his arms.                  

 

 flare up of rash                    2012                                
    



                                                                              



Results

                      





 Observation                    Observation Code                    Item       
             Item Code                    Result                    Date       
         

 

 Jordyn                    [id number]                    JORDYN (FLAVIO) SCREEN           
                             DETECTED                     2017           
     

 

 Jordyn                    [id number]                    JORDYN (IFA) TITER              
                          1:160                     2017                

 

 Jordyn                    [id number]                    JORDYN PATTERN                  
                      SPECKLED                     2017                

 

 Jordyn                    [id number]                                                 
                               2017                

 

 Cbc With Differential                    Ord2                    WBC          
                              3.84 K/ul                    2017          
      

 

 Cbc With Differential                    Ord2                    RBC          
                              5.27 M/ul                    2017          
      

 

 Cbc With Differential                    Ord2                    HGB          
                              15.6 g/dl                    2017          
      

 

 Cbc With Differential                    Ord2                    Neut%        
                                56.7 %                    2017           
     

 

 Cbc With Differential                    Ord2                    HCT          
                              45.1 %                    2017             
   

 

 Cbc With Differential                    Ord2                    MCV          
                              85.6 fl                    2017            
    

 

 Cbc With Differential                    Ord2                    Lymph%       
                                 31.0 %                    2017          
      

 

 Cbc With Differential                    Ord2                    MCH          
                              29.6 pg                    2017            
    

 

 Cbc With Differential                    Ord2                    Mono%        
                                10.4 %                    2017           
     

 

 Cbc With Differential                    Ord2                    Eos%         
                               1.6 %                    2017             
   

 

 Cbc With Differential                    Ord2                    MCHC         
                               34.6 pg                    2017           
     

 

 Cbc With Differential                    Ord2                    PLT          
                              218 K/ul                    2017           
     

 

 Cbc With Differential                    Ord2                    Baso%        
                                0.3 %                    2017            
    

 

 Cbc With Differential                    Ord2                    Neut ABS#    
                                    2.18 K/ul                    2017    
            

 

 Cbc With Differential                    Ord2                    RDW          
                              14.9 %                    2017             
   

 

 Cbc With Differential                    Ord2                    Lymph ABS#   
                                     1.19 K/ul                    2017   
             

 

 Cbc With Differential                    Ord2                    Mono ABS#    
                                    0.4 K/ul                    2017     
           

 

 Cbc With Differential                    Ord2                    Eos ABS#     
                                   0.1 K/ul                    2017      
          

 

 Cbc With Differential                    Ord2                    Baso ABS#    
                                    0.0 K/ul                    2017     
           

 

 C-Reactive Protein  Qnt                    Crqnt                    CRP       
                                 0.7 mg/dl                    2017       
         

 

 Sed Rate                    Ord21                    ESR                      
                  4 mm/hr                    2017                

 

 Ra Factor                    Mrq715                    RA FACTOR              
                          21.4 IU/ml                    2017             
   

 

 Uric Acid                    Ord77                    Uric A                  
                      6.2 mg/dL                    2017                

 

 Lipid                    Ord30                    CHOL                        
                193 mg/dL                    2016                

 

 Lipid                    Ord30                    HDL                         
               38.0 mg/dl                    2016                

 

 Lipid                    Ord30                    TRIG                        
                187 mg/dL                    2016                

 

 Lipid                    Ord30                    LDL                         
               118 mg/dL                    2016                

 

 Lipid                    Ord30                    C/HDL                       
                 5.1 Ratio                    2016                

 

 Tsh                    Ord6                    hTSH II                        
                1.78 uIU/mL                    2016                

 

 Comp Metabolic                    Kpi209                    NA                
                        135 mEq/L                    2016                

 

 Comp Metabolic                    Rbx903                    K                 
                       4.3 mEq/L                    2016                

 

 Comp Metabolic                    Lcx872                    CL                
                        102 mEq/L                    2016                

 

 Comp Metabolic                    Lro688                    CO2               
                         30.0 mEq/L                    2016              
  

 

 Comp Metabolic                    Qpn710                    ANION GAP         
                               7                     2016                

 

 Comp Metabolic                    Mum044                    GLUCOSE           
                             105 mg/dL                    2016           
     

 

 Comp Metabolic                    Wqh276                    Creat             
                           0.9 mg/dL                    2016             
   

 

 Comp Metabolic                    Mfa894                    eGFR              
                          102 ml/min/1.73m2                    2016      
          

 

 Comp Metabolic                    Chs782                    BUN               
                         13 mg/dL                    2016                

 

 Comp Metabolic                    Jpz697                    B/C Ratio         
                               14.4 Ratio                    2016        
        

 

 Comp Metabolic                    Ert779                    CALCIUM           
                             9.1 mg/dL                    2016           
     

 

 Comp Metabolic                    Rtf876                    ALK PHOS          
                              53 U/L                    2016             
   

 

 Comp Metabolic                    Cxf745                    AST(SGOT)         
                               18 U/L                    2016            
    

 

 Comp Metabolic                    Yky153                    ALT(SGPT)         
                               27 U/L                    2016            
    

 

 Comp Metabolic                    Krn822                    BILI T            
                            0.5 mg/dL                    2016            
    

 

 Comp Metabolic                    Bgi581                    ALBUMIN           
                             4.4 g/dL                    2016            
    

 

 Comp Metabolic                    Zsy350                    TPRO              
                          6.9 g/dL                    2016                

 

 Comp Metabolic                    Unu976                    GLOB              
                          2.5 g/dL                    2016                

 

 Comp Metabolic                    Wjh771                    A/G Ratio         
                               1.7 Ratio                    2016         
       

 

 Comp Metabolic                    Fyq552                    Osmo              
                          271 mOsmo                    2016              
  

 

 Cbc With Differential                    Ord2                    WBC          
                              4.48 K/ul                    2016          
      

 

 Cbc With Differential                    Ord2                    RBC          
                              5.41 M/ul                    2016          
      

 

 Cbc With Differential                    Ord2                    HGB          
                              15.7 g/dl                    2016          
      

 

 Cbc With Differential                    Ord2                    HCT          
                              46.7 %                    2016             
   

 

 Cbc With Differential                    Ord2                    Neut%        
                                48.9 %                    2016           
     

 

 Cbc With Differential                    Ord2                    MCV          
                              86.3 fl                    2016            
    

 

 Cbc With Differential                    Ord2                    Lymph%       
                                 36.6 %                    2016          
      

 

 Cbc With Differential                    Ord2                    MCH          
                              29.0 pg                    2016            
    

 

 Cbc With Differential                    Ord2                    Mono%        
                                11.4 %                    2016           
     

 

 Cbc With Differential                    Ord2                    Eos%         
                               2.9 %                    2016             
   

 

 Cbc With Differential                    Ord2                    MCHC         
                               33.6 pg                    2016           
     

 

 Cbc With Differential                    Ord2                    Baso%        
                                0.2 %                    2016            
    

 

 Cbc With Differential                    Ord2                    PLT          
                              215 K/ul                    2016           
     

 

 Cbc With Differential                    Ord2                    RDW          
                              15.0 %                    2016             
   

 

 Cbc With Differential                    Ord2                    Neut ABS#    
                                    2.19 K/ul                    2016    
            

 

 Cbc With Differential                    Ord2                    Lymph ABS#   
                                     1.64 K/ul                    2016   
             

 

 Cbc With Differential                    Ord2                    Mono ABS#    
                                    0.5 K/ul                    2016     
           

 

 Cbc With Differential                    Ord2                    Eos ABS#     
                                   0.1 K/ul                    2016      
          

 

 Cbc With Differential                    Ord2                    Baso ABS#    
                                    0.0 K/ul                    2016     
           

 

 Cbc With Differential                    Ord2                    New Analyzer 
Notice                                        Please note new ref ranges 
starting 2016 due to implemntation of new five part differential hematolgy 
analyzer.                     2016                



                                                                               
                                                                               
          



Review of Systems

                      





 System                    Result                    Effective Dates           
     

 

 Constitutional                    No recent illness                    2018                

 

 Constitutional                    No diaphoresis                    2018
                

 

 Constitutional                    No chills                    2018     
           

 

 Constitutional                    fatigue                    2018       
         

 

 Constitutional                    No fever                    2018      
          

 

 Constitutional                    malaise                    2018       
         

 

 Constitutional                    weight loss                    2018   
             

 

 Eyes                    No eye erythema                    2018         
       

 

 Ears/Nose/Throat/Neck                    No nasal discharge                    
2018                

 

 Ears/Nose/Throat/Neck                    No nasal allergies                    
2018                

 

 Cardiovascular                    No chest pain/pressure                                    

 

 Cardiovascular                    No dyspnea                    2018    
            

 

 Respiratory                    No cough                    2018         
       

 

 Respiratory                    No chest congestion                    2018                

 

 Gastrointestinal                    No abdominal pain                    2018                

 

 Gastrointestinal                    No constipation                    2018                

 

 Gastrointestinal                    No diarrhea                    2018 
               

 

 Gastrointestinal                    No gastroesophageal reflux                
    2018                

 

 Gastrointestinal                    No hematochezia                    2018                

 

 Gastrointestinal                    No melena                    2018   
             

 

 Gastrointestinal                    nausea                    2018      
          

 

 Gastrointestinal                    No vomiting                    2018 
               

 

 Gastrointestinal                    anorexia                    2018    
            

 

 Musculoskeletal                    No joint complaint                    2018                

 

 Musculoskeletal                    arthralgia(s)                    2018
                

 

 Dermatologic                    No rash                    2018         
       

 

 Neurologic                    No alteration of consciousness                  
  2018                

 

 Neurologic                    No mental status change                    2018                

 

 Psychiatric                    anxiety                    2018          
      

 

 Psychiatric                    depression                    2018       
         

 

 Constitutional                    No night sweats                    2018                

 

 Constitutional                    No insomnia                    2018   
             

 

 Constitutional                    No weight gain                    2018
                

 

 Eyes                    No eye discharge                    2018        
        

 

 Eyes                    No eye erythema                    2018         
       

 

 Ears/Nose/Throat/Neck                    dizziness                    2018                

 

 Respiratory                    No productive sputum                    2018                

 

 Genitourinary/Nephrology                    No dysuria                                    

 

 Constitutional                    No recent illness                    2017                

 

 Constitutional                    No chills                    2017     
           

 

 Constitutional                    No diaphoresis                    2017
                

 

 Constitutional                    No fever                    2017      
          

 

 Eyes                    No eye erythema                    2017         
       

 

 Ears/Nose/Throat/Neck                    No nasal discharge                    
2017                

 

 Ears/Nose/Throat/Neck                    No nasal allergies                    
2017                

 

 Cardiovascular                    No chest pain/pressure                                    

 

 Cardiovascular                    No dyspnea                    2017    
            

 

 Respiratory                    No cough                    2017         
       

 

 Respiratory                    No chest congestion                    2017                

 

 Gastrointestinal                    No abdominal pain                    2017                

 

 Musculoskeletal                    joint complaint                    2017                

 

 Neurologic                    No alteration of consciousness                  
  2017                

 

 Neurologic                    No mental status change                    2017                

 

 Constitutional                    recent illness                    05/15/2017
                

 

 Constitutional                    No chills                    05/15/2017     
           

 

 Constitutional                    fatigue                    05/15/2017       
         

 

 Constitutional                    No fever                    05/15/2017      
          

 

 Constitutional                    malaise                    05/15/2017       
         

 

 Eyes                    No eye discharge                    05/15/2017        
        

 

 Eyes                    No eye erythema                    05/15/2017         
       

 

 Ears/Nose/Throat/Neck                    nasal allergies                    05/
15/2017                

 

 Ears/Nose/Throat/Neck                    No nasal discharge                    
05/15/2017                

 

 Cardiovascular                    No chest pain/pressure                    05/
15/2017                

 

 Respiratory                    cough                    05/15/2017            
    

 

 Gastrointestinal                    No abdominal pain                    05/15/
2017                

 

 Gastrointestinal                    No constipation                    05/15/
2017                

 

 Gastrointestinal                    No diarrhea                    05/15/2017 
               

 

 Dermatologic                    No rash                    05/15/2017         
       

 

 Neurologic                    No alteration of consciousness                  
  05/15/2017                

 

 Ears/Nose/Throat/Neck                    dizziness                    05/15/
2017                

 

 Respiratory                    chest congestion                    05/15/2017 
               

 

 Respiratory                    dyspnea on exertion                    05/15/
2017                

 

 Respiratory                    No dyspnea                    05/15/2017       
         

 

 Respiratory                    wheezing                    05/15/2017         
       

 

 Neurologic                    No mental status change                    05/15/
2017                

 

 Constitutional                    No recent illness                    2017                

 

 Constitutional                    No anorexia                    2017   
             

 

 Constitutional                    No night sweats                    2017                

 

 Constitutional                    No chills                    2017     
           

 

 Constitutional                    No diaphoresis                    2017
                

 

 Constitutional                    No fatigue                    2017    
            

 

 Constitutional                    No fever                    2017      
          

 

 Constitutional                    No insomnia                    2017   
             

 

 Constitutional                    No malaise                    2017    
            

 

 Constitutional                    No weight loss                    2017
                

 

 Constitutional                    No weight gain                    2017
                

 

 Eyes                    No eye discharge                    2017        
        

 

 Eyes                    No eye erythema                    2017         
       

 

 Ears/Nose/Throat/Neck                    dizziness                    2017                

 

 Ears/Nose/Throat/Neck                    No nasal discharge                    
2017                

 

 Ears/Nose/Throat/Neck                    nasal allergies                    2017                

 

 Cardiovascular                    No chest pain/pressure                    2017                

 

 Cardiovascular                    No dyspnea                    2017    
            

 

 Respiratory                    No productive sputum                    2017                

 

 Respiratory                    No cough                    2017         
       

 

 Gastrointestinal                    No abdominal pain                    2017                

 

 Gastrointestinal                    No constipation                    2017                

 

 Gastrointestinal                    No diarrhea                    2017 
               

 

 Genitourinary/Nephrology                    No dysuria                                    

 

 Musculoskeletal                    No joint complaint                    2017                

 

 Dermatologic                    No rash                    2017         
       

 

 Neurologic                    No alteration of consciousness                  
  2017                

 

 Constitutional                    No recent illness                    2017                

 

 Constitutional                    No chills                    2017     
           

 

 Constitutional                    No fever                    2017      
          

 

 Eyes                    No eye erythema                    2017         
       

 

 Ears/Nose/Throat/Neck                    No nasal discharge                    
2017                

 

 Cardiovascular                    No chest pain/pressure                                    

 

 Cardiovascular                    No dyspnea                    2017    
            

 

 Respiratory                    No cough                    2017         
       

 

 Respiratory                    No dyspnea                    2017       
         

 

 Musculoskeletal                    joint complaint                    2017                

 

 Neurologic                    No alteration of consciousness                  
  2017                

 

 Neurologic                    No mental status change                    2017                

 

 Constitutional                    No recent illness                    06/15/
2016                

 

 Constitutional                    No night sweats                    06/15/
2016                

 

 Constitutional                    No chills                    06/15/2016     
           

 

 Constitutional                    No fatigue                    06/15/2016    
            

 

 Constitutional                    No fever                    06/15/2016      
          

 

 Constitutional                    No insomnia                    06/15/2016   
             

 

 Constitutional                    No malaise                    06/15/2016    
            

 

 Eyes                    No blindness                    06/15/2016            
    

 

 Eyes                    No vision change                    06/15/2016        
        

 

 Ears/Nose/Throat/Neck                    No dental pain                    06/
15/2016                

 

 Ears/Nose/Throat/Neck                    No dizziness                    06/15/
2016                

 

 Ears/Nose/Throat/Neck                    No dysphagia                    06/15/
2016                

 

 Ears/Nose/Throat/Neck                    No headache                    06/15/
2016                

 

 Ears/Nose/Throat/Neck                    No hearing loss                    06/
15/2016                

 

 Ears/Nose/Throat/Neck                    No nasal allergies                    
06/15/2016                

 

 Ears/Nose/Throat/Neck                    No sore throat                    06/
15/2016                

 

 Ears/Nose/Throat/Neck                    No postnasal drip                    
06/15/2016                

 

 Ears/Nose/Throat/Neck                    No sinus congestion                  
  06/15/2016                

 

 Cardiovascular                    No chest pain/pressure                    06/
15/2016                

 

 Cardiovascular                    No dyspnea                    06/15/2016    
            

 

 Cardiovascular                    No edema                    06/15/2016      
          

 

 Cardiovascular                    No exercise intolerance                    06
/15/2016                

 

 Cardiovascular                    No fatigue                    06/15/2016    
            

 

 Cardiovascular                    No hypertension                    06/15/
2016                

 

 Cardiovascular                    No near-syncope/dizziness                    
06/15/2016                

 

 Cardiovascular                    No palpitations                    06/15/
2016                

 

 Respiratory                    No chest tightness                    06/15/
2016                

 

 Respiratory                    No cigarette smoking                    06/15/
2016                

 

 Respiratory                    No cough                    06/15/2016         
       

 

 Respiratory                    No dyspnea on exertion                    06/15/
2016                

 

 Respiratory                    No dyspnea                    06/15/2016       
         

 

 Respiratory                    No pedal edema                    06/15/2016   
             

 

 Gastrointestinal                    No abdominal pain                    06/15/
2016                

 

 Gastrointestinal                    No constipation                    06/15/
2016                

 

 Gastrointestinal                    No diarrhea                    06/15/2016 
               

 

 Gastrointestinal                    No gastroesophageal reflux                
    06/15/2016                

 

 Gastrointestinal                    No hematochezia                    06/15/
2016                

 

 Gastrointestinal                    No nausea                    06/15/2016   
             

 

 Gastrointestinal                    No vomiting                    06/15/2016 
               

 

 Genitourinary/Nephrology                    No dysuria                    06/15
/2016                

 

 Genitourinary/Nephrology                    No impotence                    06/
15/2016                

 

 Genitourinary/Nephrology                    No nocturia                    06/
15/2016                

 

 Genitourinary/Nephrology                    No urinary urgency                
    06/15/2016                

 

 Genitourinary/Nephrology                    No urinary frequency              
      06/15/2016                

 

 Genitourinary/Nephrology                    No urinary incontinence           
         06/15/2016                

 

 Musculoskeletal                    No stiffness                    06/15/2016 
               

 

 Musculoskeletal                    No swelling                    06/15/2016  
              

 

 Musculoskeletal                    No arthralgia(s)                    06/15/
2016                

 

 Musculoskeletal                    No joint complaint                    06/15/
2016                

 

 Musculoskeletal                    No muscle weakness                    06/15/
2016                

 

 Musculoskeletal                    No myalgias                    06/15/2016  
              

 

 Dermatologic                    mole change                    06/15/2016     
           

 

 Dermatologic                    No rash                    06/15/2016         
       

 

 Dermatologic                    No sores                    06/15/2016        
        

 

 Dermatologic                    No scar                    06/15/2016         
       

 

 Neurologic                    No ataxia                    06/15/2016         
       

 

 Neurologic                    No dizziness                    06/15/2016      
          

 

 Neurologic                    No dyskinesia or tremor                    06/15/
2016                

 

 Neurologic                    No gait abnormality                    06/15/
2016                

 

 Neurologic                    No headache                    06/15/2016       
         

 

 Neurologic                    No neck pain                    06/15/2016      
          

 

 Neurologic                    No syncope                    06/15/2016        
        

 

 Psychiatric                    No anxiety                    06/15/2016       
         

 

 Psychiatric                    No depression                    06/15/2016    
            

 

 Constitutional                    No recent illness                    2015                

 

 Constitutional                    No chills                    2015     
           

 

 Constitutional                    No fatigue                    2015    
            

 

 Constitutional                    No fever                    2015      
          

 

 Constitutional                    No insomnia                    2015   
             

 

 Constitutional                    No malaise                    2015    
            

 

 Eyes                    No blindness                    2015            
    

 

 Eyes                    No vision change                    2015        
        

 

 Ears/Nose/Throat/Neck                    No dental pain                                    

 

 Ears/Nose/Throat/Neck                    No dizziness                    2015                

 

 Ears/Nose/Throat/Neck                    No dysphagia                    2015                

 

 Ears/Nose/Throat/Neck                    No headache                    2015                

 

 Ears/Nose/Throat/Neck                    No hearing loss                                    

 

 Ears/Nose/Throat/Neck                    No nasal allergies                    
2015                

 

 Ears/Nose/Throat/Neck                    No sore throat                                    

 

 Ears/Nose/Throat/Neck                    No postnasal drip                    
2015                

 

 Ears/Nose/Throat/Neck                    No sinus congestion                  
  2015                

 

 Cardiovascular                    No chest pain/pressure                                    

 

 Cardiovascular                    No dyspnea                    2015    
            

 

 Cardiovascular                    No edema                    2015      
          

 

 Cardiovascular                    No exercise intolerance                                    

 

 Cardiovascular                    No fatigue                    2015    
            

 

 Cardiovascular                    No near-syncope/dizziness                    
2015                

 

 Respiratory                    No chest tightness                    2015                

 

 Respiratory                    No cough                    2015         
       

 

 Respiratory                    No dyspnea                    2015       
         

 

 Respiratory                    No pedal edema                    2015   
             

 

 Gastrointestinal                    No abdominal pain                    2015                

 

 Gastrointestinal                    No constipation                    2015                

 

 Gastrointestinal                    No diarrhea                    2015 
               

 

 Gastrointestinal                    No gastroesophageal reflux                
    2015                

 

 Gastrointestinal                    No nausea                    2015   
             

 

 Gastrointestinal                    No vomiting                    2015 
               

 

 Genitourinary/Nephrology                    No dysuria                                    

 

 Genitourinary/Nephrology                    No nocturia                                    

 

 Genitourinary/Nephrology                    No urinary incontinence           
         2015                

 

 Musculoskeletal                    No stiffness                    2015 
               

 

 Musculoskeletal                    No swelling                    2015  
              

 

 Musculoskeletal                    No muscle weakness                    2015                

 

 Musculoskeletal                    No myalgias                    2015  
              

 

 Dermatologic                    No rash                    2015         
       

 

 Dermatologic                    No sores                    2015        
        

 

 Dermatologic                    No scar                    2015         
       

 

 Neurologic                    No dizziness                    2015      
          

 

 Neurologic                    No headache                    2015       
         

 

 Neurologic                    No neck pain                    2015      
          

 

 Neurologic                    No syncope                    2015        
        

 

 Psychiatric                    No anxiety                    2015       
         

 

 Psychiatric                    No depression                    2015    
            

 

 Constitutional                    No night sweats                    2015                

 

 Cardiovascular                    No hypertension                    2015                

 

 Cardiovascular                    No palpitations                    2015                

 

 Respiratory                    No cigarette smoking                    2015                

 

 Respiratory                    No dyspnea on exertion                    2015                

 

 Gastrointestinal                    No hematochezia                    2015                

 

 Genitourinary/Nephrology                    No impotence                                    

 

 Genitourinary/Nephrology                    No urinary frequency              
      2015                

 

 Genitourinary/Nephrology                    No urinary urgency                
    2015                

 

 Musculoskeletal                    No arthralgia(s)                    2015                

 

 Musculoskeletal                    No joint complaint                    2015                

 

 Neurologic                    No ataxia                    2015         
       

 

 Neurologic                    No dyskinesia or tremor                    2015                

 

 Neurologic                    No gait abnormality                    2015                

 

 Dermatologic                    mole change                    2015     
           

 

 Constitutional                    No recent illness                    2013                

 

 Constitutional                    No chills                    2013     
           

 

 Constitutional                    No fatigue                    2013    
            

 

 Constitutional                    No fever                    2013      
          

 

 Constitutional                    No insomnia                    2013   
             

 

 Constitutional                    No malaise                    2013    
            

 

 Cardiovascular                    No chest pain/pressure                                    

 

 Cardiovascular                    No dyspnea                    2013    
            

 

 Cardiovascular                    No edema                    2013      
          

 

 Cardiovascular                    No exercise intolerance                                    

 

 Cardiovascular                    No fatigue                    2013    
            

 

 Cardiovascular                    No near-syncope/dizziness                    
2013                

 

 Respiratory                    No chest tightness                    2013                

 

 Respiratory                    No cigarette smoking                    2013                

 

 Respiratory                    No cough                    2013         
       

 

 Respiratory                    No dyspnea                    2013       
         

 

 Respiratory                    No pedal edema                    2013   
             

 

 Respiratory                    No snoring                    2013       
         

 

 Respiratory                    No wheezing                    2013      
          

 

 Psychiatric                    No anxiety                    2013       
         

 

 Psychiatric                    No depression                    2013    
            

 

 Dermatologic                    No rash                    2013         
       

 

 Dermatologic                    No scar                    2013         
       

 

 Gastrointestinal                    No hemorrhoids                    2013                

 

 Gastrointestinal                    No abdominal pain                    2013                

 

 Gastrointestinal                    No constipation                    2013                

 

 Gastrointestinal                    No diarrhea                    2013 
               

 

 Gastrointestinal                    No gastroesophageal reflux                
    2013                

 

 Gastrointestinal                    No melena                    2013   
             

 

 Gastrointestinal                    No nausea                    2013   
             

 

 Gastrointestinal                    No vomiting                    2013 
               

 

 Constitutional                    No chills                    2013     
           

 

 Constitutional                    No fatigue                    2013    
            

 

 Constitutional                    No fever                    2013      
          

 

 Eyes                    No vision change                    2013        
        

 

 Ears/Nose/Throat/Neck                    No dizziness                    2013                

 

 Ears/Nose/Throat/Neck                    No headache                    2013                

 

 Cardiovascular                    No chest pain/pressure                                    

 

 Respiratory                    No dyspnea                    2013       
         

 

 Gastrointestinal                    No constipation                    2013                

 

 Gastrointestinal                    No diarrhea                    2013 
               

 

 Gastrointestinal                    No nausea                    2013   
             

 

 Gastrointestinal                    No vomiting                    2013 
               

 

 Neurologic                    No dizziness                    2013      
          

 

 Psychiatric                    No anxiety                    2013       
         

 

 Psychiatric                    No depression                    2013    
            

 

 Constitutional                    recent illness                    2012
                

 

 Constitutional                    No anorexia                    2012   
             

 

 Constitutional                    No night sweats                    2012                

 

 Constitutional                    No chills                    2012     
           

 

 Constitutional                    No diaphoresis                    2012
                

 

 Constitutional                    No fatigue                    2012    
            

 

 Constitutional                    No fever                    2012      
          

 

 Constitutional                    No insomnia                    2012   
             

 

 Constitutional                    No malaise                    2012    
            

 

 Eyes                    No eye discharge                    2012        
        

 

 Eyes                    No eye erythema                    2012         
       

 

 Cardiovascular                    No chest pain/pressure                                    

 

 Respiratory                    cough                    2012            
    

 

 Respiratory                    chest congestion                    2012 
               

 

 Respiratory                    No dyspnea on exertion                    2012                

 

 Respiratory                    No dyspnea                    2012       
         

 

 Gastrointestinal                    No vomiting                    2012 
               

 

 Gastrointestinal                    No nausea                    2012   
             

 

 Gastrointestinal                    No constipation                    2012                

 

 Gastrointestinal                    No diarrhea                    2012 
               

 

 Gastrointestinal                    No abdominal pain                    2012                

 

 Genitourinary/Nephrology                    No dysuria                                    

 

 Musculoskeletal                    No joint complaint                    2012                

 

 Constitutional                    No chills                    2012     
           

 

 Constitutional                    No fatigue                    2012    
            

 

 Eyes                    No vision change                    2012        
        

 

 Respiratory                    No dyspnea                    2012       
         

 

 Constitutional                    No fever                    2012      
          

 

 Ears/Nose/Throat/Neck                    No headache                    2012                

 

 Ears/Nose/Throat/Neck                    No dizziness                    2012                

 

 Cardiovascular                    No chest pain/pressure                                    

 

 Gastrointestinal                    No constipation                    2012                

 

 Gastrointestinal                    No diarrhea                    2012 
               

 

 Gastrointestinal                    No vomiting                    2012 
               

 

 Gastrointestinal                    No nausea                    2012   
             

 

 Neurologic                    No dizziness                    2012      
          

 

 Psychiatric                    No anxiety                    2012       
         

 

 Psychiatric                    No depression                    2012    
            

 

 Constitutional                    No fever                    2012      
          

 

 Cardiovascular                    No chest pain/pressure                                    

 

 Ears/Nose/Throat/Neck                    No dizziness                    2012                

 

 Ears/Nose/Throat/Neck                    No headache                    2012                

 

 Gastrointestinal                    No constipation                    2012                

 

 Gastrointestinal                    No diarrhea                    2012 
               

 

 Gastrointestinal                    No nausea                    2012   
             

 

 Gastrointestinal                    No vomiting                    2012 
               

 

 Neurologic                    No dizziness                    2012      
          

 

 Psychiatric                    No anxiety                    2012       
         

 

 Psychiatric                    No depression                    2012    
            



                                                                    



Physical Exam

                      





 Exam Name                    System Name                    Item Name         
           Status                    Result                    Effective Dates 
                   Notes                

 

 Full Exam - General                     Constitutional                     
general appearance                    Hygiene/Attention to Grooming:           
         good hygiene                    2018                    None    
            

 

 Full Exam - General                     Eyes                    conjunctiva
/eyelids                    Overall:                    conjunctiva clear      
              2018                    None                

 

 Full Exam - General                     Eyes                    conjunctiva
/eyelids                    Overall:                    cornea clear           
         2018                    None                

 

 Full Exam - General                     Eyes                    conjunctiva
/eyelids                    Overall:                    eyelids normal         
           2018                    None                

 

 Full Exam - General                     Eyes                    pupils and 
irises                    Overall:                    pupils equal, round, 
reactive to light and accomodation                    2018               
     None                

 

 Full Exam - General                     Ears/Nose/Throat                  
  otoscopic exam                    Overall:                    external 
auditory canals clear                    2018                    None    
            

 

 Full Exam - General                     Ears/Nose/Throat                  
  otoscopic exam                    Overall:                    tympanic 
membranes clear                    2018                    None          
      

 

 Full Exam - General                     Ears/Nose/Throat                  
  hearing assessment                    Overall:                    hearing 
intact bilaterally                    2018                    None       
         

 

 Full Exam - General                     Ears/Nose/Throat                  
  lips/teeth/gingiva                    Overall:                    benign lips
                    2018                    None                

 

 Full Exam - General                     Ears/Nose/Throat                  
  lips/teeth/gingiva                    Overall:                    normal 
dentition                    2018                    None                

 

 Full Exam - General                     Ears/Nose/Throat                  
  lips/teeth/gingiva                    Overall:                    benign 
gingiva                    2018                    None                

 

 Full Exam - General                     Ears/Nose/Throat                  
  oral cavity/pharynx/larynx                     Overall:                    
oral mucosa clear                    2018                    None        
        

 

 Full Exam - General                     Ears/Nose/Throat                  
  oral cavity/pharynx/larynx                     Overall:                    
oropharyngeal mucosa clear                    2018                    
None                

 

 Full Exam - General                     Respiratory                    
auscultation                    Overall:                    breath sounds clear 
bilaterally                    2018                    None              
  

 

 Full Exam - General                     Respiratory                    
respiratory effort/rhythm                    Overall:                    no 
retractions                    2018                    None              
  

 

 Full Exam - General                     Respiratory                    
respiratory effort/rhythm                    Overall:                    normal 
rate                    2018                    None                

 

 Full Exam - General                     Cardiovascular                    
extremities                    Overall:                    no clubbing         
           2018                    None                

 

 Full Exam - General                     Cardiovascular                    
auscultation of heart                    Overall:                    regular 
rate                    2018                    None                

 

 Full Exam - General                     Cardiovascular                    
auscultation of heart                    Overall:                    normal 
heart sounds                    2018                    None             
   

 

 Full Exam - General                     Abdomen                    
abdominal exam                    Overall:                    no tenderness    
                2018                    None                

 

 Full Exam - General                     Abdomen                    
abdominal exam                    Overall:                    normal bowel 
sounds                    2018                    None                

 

 Full Exam - General                     Lymphatic                    neck 
nodes                    Overall:                    anterior cervical chain 
benign                    2018                    None                

 

 Full Exam - General                     Lymphatic                    neck 
nodes                    Overall:                    posterior cervical chain 
benign                    2018                    None                

 

 Full Exam - General                     Musculoskeletal                    
spine, ribs and pelvis                    Overall:                    good 
posture                    2018                    None                

 

 Full Exam - General                     Musculoskeletal                    
gait and station                    Overall:                    normal gait    
                2018                    None                

 

 Full Exam - General                     Musculoskeletal                    
gait and station                    Overall:                    normal station 
                   2018                    None                

 

 Full Exam - General                     Musculoskeletal                    
head and neck                    Overall:                    head atraumatic   
                 2018                    None                

 

 Full Exam - General                     Neurologic                    
cranial nerves                    Overall:                    crainial nerves 2 
- 12 grossly intact                    2018                    None      
          

 

 Full Exam - General                     Psychiatric                    
orientation/consciousness                    Overall:                    
oriented to person, place and time                    2018               
     None                

 

 Full Exam - General                     Constitutional                     
general appearance                    Overall:                    well 
developed                    2018                    None                

 

 Full Exam - General                     Constitutional                     
general appearance                    Overall:                    well 
nourished                    2018                    None                

 

 Full Exam - General                     Constitutional                     
general appearance                    Evidence of Distress:                    
mild distress                    2018                    None            
    

 

 Full Exam - General                     Constitutional                     
general appearance                    Evidence of Distress:                    
tearful                    2018                    None                

 

 Full Exam - General                     Ears/Nose/Throat                  
  otoscopic exam                    Tympanic membrane:                    air-
fluid level                    2018                    None              
  

 

 Full Exam - General                     Psychiatric                    
mood and affect                    Mood:                    flat               
     2018                    None                

 

 Full Exam - General                     Psychiatric                    
mood and affect                    Mood:                    depressed          
          2018                    None                

 

 Full Exam - General                     Psychiatric                    
mood and affect                    Mood:                    anxious            
        2018                    None                

 

 Full Exam - General                     Psychiatric                    
mood and affect                    Affect:                    flat             
       2018                    None                

 

 Full Exam - Orthopedics                    Constitutional                     
general appearance                    Overall:                    well 
nourished                    2017                    None                

 

 Full Exam - Orthopedics                    Constitutional                     
general appearance                    Overall:                    well 
developed                    2017                    None                

 

 Full Exam - Orthopedics                    Constitutional                     
general appearance                    Overall:                    in no acute 
distress                    2017                    None                

 

 Full Exam - Orthopedics                    Eyes                    conjunctiva/
eyelids                    Overall:                    conjunctiva clear       
             2017                    None                

 

 Full Exam - Orthopedics                    Eyes                    conjunctiva/
eyelids                    Overall:                    eyelids normal          
          2017                    None                

 

 Full Exam - Orthopedics                    Eyes                    conjunctiva/
eyelids                    Overall:                    cornea clear            
        2017                    None                

 

 Full Exam - Orthopedics                    Ears/Nose/Throat                    
oral cavity/pharynx/larynx                     Overall:                    oral 
mucosa clear                    2017                    None             
   

 

 Full Exam - Orthopedics                    Ears/Nose/Throat                    
lips/teeth/gingiva                    Overall:                    benign lips  
                  2017                    None                

 

 Full Exam - Orthopedics                    Respiratory                    
respiratory effort/rhythm                    Overall:                    no 
retractions                    2017                    None              
  

 

 Full Exam - Orthopedics                    Respiratory                    
respiratory effort/rhythm                    Overall:                    normal 
rate                    2017                    None                

 

 Full Exam - Orthopedics                    Respiratory                    
auscultation                    Overall:                    breath sounds clear 
bilaterally                    2017                    None              
  

 

 Full Exam - Orthopedics                    MS: left upper extremity           
         insp & palp - LUE                    Wrist:                    
swelling                    2017                    None                

 

 Full Exam - Orthopedics                    MS: left upper extremity           
         insp & palp - LUE                    Wrist:                    tender 
                   2017                    None                

 

 Full Exam - Orthopedics                    MS: left upper extremity           
         range of motion - LUE                    Wrist:                    
pain with extension                    2017                    None      
          

 

 Full Exam - Orthopedics                    MS: left upper extremity           
         range of motion - LUE                    Wrist:                    
pain with flexion                    2017                    None        
        

 

 Full Exam - Orthopedics                    Psychiatric                    
orientation/consciousness                    Overall:                    
oriented to person, place and time                    2017               
     None                

 

 Full Exam - Orthopedics                    Psychiatric                    mood 
and affect                    Overall:                    normal mood and 
affect                    2017                    None                

 

 Full Exam - Orthopedics                    Psychiatric                    
appearance                    Overall:                    well-groomed, good 
eye contact                    2017                    None              
  

 

 Full Exam - General                     Constitutional                     
general appearance                    Hygiene/Attention to Grooming:           
         good hygiene                    05/15/2017                    None    
            

 

 Full Exam - General                     Eyes                    conjunctiva
/eyelids                    Overall:                    conjunctiva clear      
              05/15/2017                    None                

 

 Full Exam - General                     Eyes                    conjunctiva
/eyelids                    Overall:                    eyelids normal         
           05/15/2017                    None                

 

 Full Exam - General                     Ears/Nose/Throat                  
  otoscopic exam                    Overall:                    external 
auditory canals clear                    05/15/2017                    None    
            

 

 Full Exam - General                     Ears/Nose/Throat                  
  otoscopic exam                    Overall:                    tympanic 
membranes clear                    05/15/2017                    None          
      

 

 Full Exam - General                     Ears/Nose/Throat                  
  lips/teeth/gingiva                    Overall:                    benign lips
                    05/15/2017                    None                

 

 Full Exam - General                     Ears/Nose/Throat                  
  oral cavity/pharynx/larynx                     Overall:                    
oral mucosa clear                    05/15/2017                    None        
        

 

 Full Exam - General                     Ears/Nose/Throat                  
  oral cavity/pharynx/larynx                     Overall:                    
oropharyngeal mucosa clear                    05/15/2017                    
None                

 

 Full Exam - General                     Respiratory                    
respiratory effort/rhythm                    Overall:                    no 
retractions                    05/15/2017                    None              
  

 

 Full Exam - General                     Respiratory                    
respiratory effort/rhythm                    Overall:                    normal 
rate                    05/15/2017                    None                

 

 Full Exam - General                     Cardiovascular                    
auscultation of heart                    Overall:                    regular 
rate                    05/15/2017                    None                

 

 Full Exam - General                     Cardiovascular                    
auscultation of heart                    Overall:                    normal 
heart sounds                    05/15/2017                    None             
   

 

 Full Exam - General                     Musculoskeletal                    
spine, ribs and pelvis                    Overall:                    good 
posture                    05/15/2017                    None                

 

 Full Exam - General                     Musculoskeletal                    
gait and station                    Overall:                    normal gait    
                05/15/2017                    None                

 

 Full Exam - General                     Musculoskeletal                    
gait and station                    Overall:                    normal station 
                   05/15/2017                    None                

 

 Full Exam - General                     Musculoskeletal                    
head and neck                    Overall:                    head atraumatic   
                 05/15/2017                    None                

 

 Full Exam - General                     Neurologic                    
cranial nerves                    Overall:                    crainial nerves 2 
- 12 grossly intact                    05/15/2017                    None      
          

 

 Full Exam - General                     Psychiatric                    
orientation/consciousness                    Overall:                    
oriented to person, place and time                    05/15/2017               
     None                

 

 Full Exam - General                     Psychiatric                    
mood and affect                    Overall:                    normal mood and 
affect                    05/15/2017                    None                

 

 Full Exam - General                     Constitutional                     
general appearance                    Overall:                    well 
developed                    05/15/2017                    None                

 

 Full Exam - General                     Constitutional                     
general appearance                    Overall:                    in no acute 
distress                    05/15/2017                    None                

 

 Full Exam - General                     Constitutional                     
general appearance                    Overall:                    well 
nourished                    05/15/2017                    None                

 

 Full Exam - General                     Respiratory                    
auscultation                    Lower lung field:                    expiratory 
wheezes                    05/15/2017                    None                

 

 Full Exam - General                     Lymphatic                    neck 
nodes                    Overall:                    posterior cervical chain 
benign                    05/15/2017                    None                

 

 Full Exam - General                     Lymphatic                    neck 
nodes                    Overall:                    anterior cervical chain 
benign                    05/15/2017                    None                

 

 Full Exam - General                     Constitutional                     
general appearance                    Development:                    well 
developed                    2017                    None                

 

 Full Exam - General                     Constitutional                     
general appearance                    Development:                    appears 
stated age                    2017                    None                

 

 Full Exam - General                     Constitutional                     
general appearance                    Hygiene/Attention to Grooming:           
         good hygiene                    2017                    None    
            

 

 Full Exam - General                     Eyes                    conjunctiva
/eyelids                    Overall:                    conjunctiva clear      
              2017                    None                

 

 Full Exam - General                     Eyes                    conjunctiva
/eyelids                    Overall:                    cornea clear           
         2017                    None                

 

 Full Exam - General                     Eyes                    conjunctiva
/eyelids                    Overall:                    eyelids normal         
           2017                    None                

 

 Full Exam - General                     Eyes                    pupils and 
irises                    Overall:                    pupils equal, round, 
reactive to light and accomodation                    2017               
     None                

 

 Full Exam - General                     Ears/Nose/Throat                  
  external ear                    Overall:                    normal appearance
                    2017                    None                

 

 Full Exam - General                     Ears/Nose/Throat                  
  external nose                    Overall:                    benign 
appearance                    2017                    None                

 

 Full Exam - General                     Ears/Nose/Throat                  
  otoscopic exam                    Overall:                    external 
auditory canals clear                    2017                    None    
            

 

 Full Exam - General                     Ears/Nose/Throat                  
  otoscopic exam                    Overall:                    tympanic 
membranes clear                    2017                    None          
      

 

 Full Exam - General                     Ears/Nose/Throat                  
  hearing assessment                    Overall:                    hearing 
intact bilaterally                    2017                    None       
         

 

 Full Exam - General                     Ears/Nose/Throat                  
  lips/teeth/gingiva                    Overall:                    benign lips
                    2017                    None                

 

 Full Exam - General                     Ears/Nose/Throat                  
  lips/teeth/gingiva                    Overall:                    normal 
dentition                    2017                    None                

 

 Full Exam - General                     Ears/Nose/Throat                  
  lips/teeth/gingiva                    Overall:                    benign 
gingiva                    2017                    None                

 

 Full Exam - General                     Ears/Nose/Throat                  
  oral cavity/pharynx/larynx                     Overall:                    
oral mucosa clear                    2017                    None        
        

 

 Full Exam - General                     Ears/Nose/Throat                  
  oral cavity/pharynx/larynx                     Overall:                    
oropharyngeal mucosa clear                    2017                    
None                

 

 Full Exam - General                     Ears/Nose/Throat                  
  oral cavity/pharynx/larynx                     Overall:                    
hypopharynx benign                    2017                    None       
         

 

 Full Exam - General                     Ears/Nose/Throat                  
  oral cavity/pharynx/larynx                     Overall:                    no 
masses                    2017                    None                

 

 Full Exam - General                     Neck                    thyroid   
                 Overall:                    nontender                    2017                    None                

 

 Full Exam - General                     Neck                    thyroid   
                 Overall:                    no mass lesions                    
2017                    None                

 

 Full Exam - General                     Respiratory                    
auscultation                    Overall:                    breath sounds clear 
bilaterally                    2017                    None              
  

 

 Full Exam - General                     Respiratory                    
respiratory effort/rhythm                    Overall:                    no 
retractions                    2017                    None              
  

 

 Full Exam - General                     Respiratory                    
respiratory effort/rhythm                    Overall:                    normal 
rate                    2017                    None                

 

 Full Exam - General                     Cardiovascular                    
inspection of carotid pulses                    Overall:                    
strong, bilaterally equal, no bruits                    2017             
       None                

 

 Full Exam - General                     Cardiovascular                    
extremities                    Overall:                    no clubbing         
           2017                    None                

 

 Full Exam - General                     Cardiovascular                    
auscultation of heart                    Overall:                    regular 
rate                    2017                    None                

 

 Full Exam - General                     Cardiovascular                    
auscultation of heart                    Overall:                    normal 
heart sounds                    2017                    None             
   

 

 Full Exam - General                     Cardiovascular                    
auscultation of heart                    Overall:                    no murmurs
                    2017                    None                

 

 Full Exam - General                     Abdomen                    
abdominal exam                    Overall:                    no tenderness    
                2017                    None                

 

 Full Exam - General                     Abdomen                    
abdominal exam                    Overall:                    normal bowel 
sounds                    2017                    None                

 

 Full Exam - General                     Abdomen                    liver 
and spleen exam                    Overall:                    no 
hepatosplenomegaly                    2017                    None       
         

 

 Full Exam - General                     Lymphatic                    neck 
nodes                    Overall:                    anterior cervical chain 
benign                    2017                    None                

 

 Full Exam - General                     Lymphatic                    neck 
nodes                    Overall:                    posterior cervical chain 
benign                    2017                    None                

 

 Full Exam - General                     Musculoskeletal                    
digits and nails                    Digits:                    a normal exam   
                 2017                    None                

 

 Full Exam - General                     Musculoskeletal                    
spine, ribs and pelvis                    Overall:                    spine 
benign                    2017                    None                

 

 Full Exam - General                     Musculoskeletal                    
spine, ribs and pelvis                    Overall:                    
sacroiliac joint benign                    2017                    None  
              

 

 Full Exam - General                     Musculoskeletal                    
spine, ribs and pelvis                    Overall:                    good 
posture                    2017                    None                

 

 Full Exam - General                     Musculoskeletal                    
gait and station                    Overall:                    normal gait    
                2017                    None                

 

 Full Exam - General                     Musculoskeletal                    
gait and station                    Overall:                    normal station 
                   2017                    None                

 

 Full Exam - General                     Musculoskeletal                    
head and neck                    Overall:                    head atraumatic   
                 2017                    None                

 

 Full Exam - General                     Musculoskeletal                    
head and neck                    Overall:                    cervical spine 
benign                    2017                    None                

 

 Full Exam - General                     Neurologic                    deep 
tendon reflexes                    Overall:                    deep tendon 
reflexes intact                    2017                    None          
      

 

 Full Exam - General                     Neurologic                    
cranial nerves                    Overall:                    crainial nerves 2 
- 12 grossly intact                    2017                    None      
          

 

 Full Exam - General                     Psychiatric                    
orientation/consciousness                    Overall:                    
oriented to person, place and time                    2017               
     None                

 

 Full Exam - General                     Psychiatric                    
mood and affect                    Overall:                    normal mood and 
affect                    2017                    None                

 

 Full Exam - Orthopedics                    Constitutional                     
general appearance                    Overall:                    well 
nourished                    2017                    None                

 

 Full Exam - Orthopedics                    Constitutional                     
general appearance                    Overall:                    well 
developed                    2017                    None                

 

 Full Exam - Orthopedics                    Constitutional                     
general appearance                    Overall:                    in no acute 
distress                    2017                    None                

 

 Full Exam - Orthopedics                    Eyes                    conjunctiva/
eyelids                    Overall:                    conjunctiva clear       
             2017                    None                

 

 Full Exam - Orthopedics                    Eyes                    conjunctiva/
eyelids                    Overall:                    eyelids normal          
          2017                    None                

 

 Full Exam - Orthopedics                    Ears/Nose/Throat                    
lips/teeth/gingiva                    Overall:                    benign lips  
                  2017                    None                

 

 Full Exam - Orthopedics                    Ears/Nose/Throat                    
oral cavity/pharynx/larynx                     Overall:                    oral 
mucosa clear                    2017                    None             
   

 

 Full Exam - Orthopedics                    Respiratory                    
respiratory effort/rhythm                    Overall:                    no 
retractions                    2017                    None              
  

 

 Full Exam - Orthopedics                    Respiratory                    
respiratory effort/rhythm                    Overall:                    normal 
rate                    2017                    None                

 

 Full Exam - Orthopedics                    Psychiatric                    
orientation/consciousness                    Overall:                    
oriented to person, place and time                    2017               
     None                

 

 Full Exam - Orthopedics                    Psychiatric                    mood 
and affect                    Overall:                    normal mood and 
affect                    2017                    None                

 

 Full Exam - Orthopedics                    Psychiatric                    
appearance                    Overall:                    well-groomed, good 
eye contact                    2017                    None              
  

 

 Full Exam - Orthopedics                    MS: right upper extremity          
          insp & palp - RUE                    Wrist:                    
redness                    2017                    None                

 

 Full Exam - Orthopedics                    MS: right upper extremity          
          insp & palp - RUE                    Wrist:                    joint 
swelling                    2017                    None                

 

 Full Exam - Orthopedics                    MS: right upper extremity          
          range of motion - RUE                    Wrist:                    
pain with extension                    2017                    None      
          

 

 Full Exam - General                     Constitutional                     
general appearance                    Development:                    well 
developed                    06/15/2016                    None                

 

 Full Exam - General                     Constitutional                     
general appearance                    Development:                    appears 
stated age                    06/15/2016                    None                

 

 Full Exam - General                     Constitutional                     
general appearance                    Hygiene/Attention to Grooming:           
         good hygiene                    06/15/2016                    None    
            

 

 Full Exam - General                     Eyes                    conjunctiva
/eyelids                    Overall:                    conjunctiva clear      
              06/15/2016                    None                

 

 Full Exam - General                     Eyes                    conjunctiva
/eyelids                    Overall:                    cornea clear           
         06/15/2016                    None                

 

 Full Exam - General                     Eyes                    conjunctiva
/eyelids                    Overall:                    eyelids normal         
           06/15/2016                    None                

 

 Full Exam - General                     Eyes                    pupils and 
irises                    Overall:                    pupils equal, round, 
reactive to light and accomodation                    06/15/2016               
     None                

 

 Full Exam - General                     Ears/Nose/Throat                  
  external ear                    Overall:                    normal appearance
                    06/15/2016                    None                

 

 Full Exam - General                     Ears/Nose/Throat                  
  external nose                    Overall:                    benign 
appearance                    06/15/2016                    None                

 

 Full Exam - General                     Ears/Nose/Throat                  
  otoscopic exam                    Overall:                    external 
auditory canals clear                    06/15/2016                    None    
            

 

 Full Exam - General                     Ears/Nose/Throat                  
  otoscopic exam                    Overall:                    tympanic 
membranes clear                    06/15/2016                    None          
      

 

 Full Exam - General                     Ears/Nose/Throat                  
  hearing assessment                    Overall:                    hearing 
intact bilaterally                    06/15/2016                    None       
         

 

 Full Exam - General                     Ears/Nose/Throat                  
  lips/teeth/gingiva                    Overall:                    benign lips
                    06/15/2016                    None                

 

 Full Exam - General                     Ears/Nose/Throat                  
  lips/teeth/gingiva                    Overall:                    normal 
dentition                    06/15/2016                    None                

 

 Full Exam - General                     Ears/Nose/Throat                  
  lips/teeth/gingiva                    Overall:                    benign 
gingiva                    06/15/2016                    None                

 

 Full Exam - General                     Ears/Nose/Throat                  
  oral cavity/pharynx/larynx                     Overall:                    
oral mucosa clear                    06/15/2016                    None        
        

 

 Full Exam - General                     Ears/Nose/Throat                  
  oral cavity/pharynx/larynx                     Overall:                    
oropharyngeal mucosa clear                    06/15/2016                    
None                

 

 Full Exam - General                     Ears/Nose/Throat                  
  oral cavity/pharynx/larynx                     Overall:                    
hypopharynx benign                    06/15/2016                    None       
         

 

 Full Exam - General                     Ears/Nose/Throat                  
  oral cavity/pharynx/larynx                     Overall:                    no 
masses                    06/15/2016                    None                

 

 Full Exam - General                     Neck                    thyroid   
                 Overall:                    nontender                    06/15/
2016                    None                

 

 Full Exam - General                     Neck                    thyroid   
                 Overall:                    no mass lesions                    
06/15/2016                    None                

 

 Full Exam - General                     Respiratory                    
auscultation                    Overall:                    breath sounds clear 
bilaterally                    06/15/2016                    None              
  

 

 Full Exam - General                     Respiratory                    
respiratory effort/rhythm                    Overall:                    no 
retractions                    06/15/2016                    None              
  

 

 Full Exam - General                     Respiratory                    
respiratory effort/rhythm                    Overall:                    normal 
rate                    06/15/2016                    None                

 

 Full Exam - General                     Cardiovascular                    
inspection of carotid pulses                    Overall:                    
strong, bilaterally equal, no bruits                    06/15/2016             
       None                

 

 Full Exam - General                     Cardiovascular                    
extremities                    Overall:                    no clubbing         
           06/15/2016                    None                

 

 Full Exam - General                     Cardiovascular                    
auscultation of heart                    Overall:                    regular 
rate                    06/15/2016                    None                

 

 Full Exam - General                     Cardiovascular                    
auscultation of heart                    Overall:                    normal 
heart sounds                    06/15/2016                    None             
   

 

 Full Exam - General                     Cardiovascular                    
auscultation of heart                    Overall:                    no murmurs
                    06/15/2016                    None                

 

 Full Exam - General                     Abdomen                    
abdominal exam                    Overall:                    no tenderness    
                06/15/2016                    None                

 

 Full Exam - General                     Abdomen                    
abdominal exam                    Overall:                    normal bowel 
sounds                    06/15/2016                    None                

 

 Full Exam - General                     Abdomen                    liver 
and spleen exam                    Overall:                    no 
hepatosplenomegaly                    06/15/2016                    None       
         

 

 Full Exam - General                     Lymphatic                    neck 
nodes                    Overall:                    anterior cervical chain 
benign                    06/15/2016                    None                

 

 Full Exam - General                     Lymphatic                    neck 
nodes                    Overall:                    posterior cervical chain 
benign                    06/15/2016                    None                

 

 Full Exam - General                     Musculoskeletal                    
digits and nails                    Digits:                    a normal exam   
                 06/15/2016                    None                

 

 Full Exam - General                     Musculoskeletal                    
spine, ribs and pelvis                    Overall:                    spine 
benign                    06/15/2016                    None                

 

 Full Exam - General                     Musculoskeletal                    
spine, ribs and pelvis                    Overall:                    
sacroiliac joint benign                    06/15/2016                    None  
              

 

 Full Exam - General                     Musculoskeletal                    
spine, ribs and pelvis                    Overall:                    good 
posture                    06/15/2016                    None                

 

 Full Exam - General                     Musculoskeletal                    
gait and station                    Overall:                    normal gait    
                06/15/2016                    None                

 

 Full Exam - General                     Musculoskeletal                    
gait and station                    Overall:                    normal station 
                   06/15/2016                    None                

 

 Full Exam - General                     Musculoskeletal                    
head and neck                    Overall:                    head atraumatic   
                 06/15/2016                    None                

 

 Full Exam - General                     Musculoskeletal                    
head and neck                    Overall:                    cervical spine 
benign                    06/15/2016                    None                

 

 Full Exam - General                     Integument                    
inspection of skin                    Overall:                    few scattered 
moles, no gross abnormalities                    06/15/2016                    
dermatofibroma of leg, darkening of skin on lower back.                

 

 Full Exam - General                     Neurologic                    deep 
tendon reflexes                    Overall:                    deep tendon 
reflexes intact                    06/15/2016                    None          
      

 

 Full Exam - General                     Neurologic                    
cranial nerves                    Overall:                    crainial nerves 2 
- 12 grossly intact                    06/15/2016                    None      
          

 

 Full Exam - General                     Psychiatric                    
orientation/consciousness                    Overall:                    
oriented to person, place and time                    06/15/2016               
     None                

 

 Full Exam - General                     Psychiatric                    
mood and affect                    Overall:                    normal mood and 
affect                    06/15/2016                    None                

 

 Full Exam - General                     Constitutional                     
general appearance                    Development:                    well 
developed                    2015                    None                

 

 Full Exam - General                     Constitutional                     
general appearance                    Development:                    appears 
stated age                    2015                    None                

 

 Full Exam - General                     Constitutional                     
general appearance                    Hygiene/Attention to Grooming:           
         good hygiene                    2015                    None    
            

 

 Full Exam - General                     Eyes                    conjunctiva
/eyelids                    Overall:                    conjunctiva clear      
              2015                    None                

 

 Full Exam - General                     Eyes                    conjunctiva
/eyelids                    Overall:                    cornea clear           
         2015                    None                

 

 Full Exam - General                     Eyes                    conjunctiva
/eyelids                    Overall:                    eyelids normal         
           2015                    None                

 

 Full Exam - General                     Eyes                    pupils and 
irises                    Overall:                    pupils equal, round, 
reactive to light and accomodation                    2015               
     None                

 

 Full Exam - General                     Ears/Nose/Throat                  
  otoscopic exam                    Overall:                    external 
auditory canals clear                    2015                    None    
            

 

 Full Exam - General                     Ears/Nose/Throat                  
  otoscopic exam                    Overall:                    tympanic 
membranes clear                    2015                    None          
      

 

 Full Exam - General                     Ears/Nose/Throat                  
  lips/teeth/gingiva                    Overall:                    benign lips
                    2015                    None                

 

 Full Exam - General                     Ears/Nose/Throat                  
  lips/teeth/gingiva                    Overall:                    normal 
dentition                    2015                    None                

 

 Full Exam - General                     Ears/Nose/Throat                  
  oral cavity/pharynx/larynx                     Overall:                    
oral mucosa clear                    2015                    None        
        

 

 Full Exam - General                     Ears/Nose/Throat                  
  oral cavity/pharynx/larynx                     Overall:                    
oropharyngeal mucosa clear                    2015                    
None                

 

 Full Exam - General                     Ears/Nose/Throat                  
  oral cavity/pharynx/larynx                     Overall:                    
hypopharynx benign                    2015                    None       
         

 

 Full Exam - General                     Ears/Nose/Throat                  
  oral cavity/pharynx/larynx                     Overall:                    no 
masses                    2015                    None                

 

 Full Exam - General                     Respiratory                    
auscultation                    Overall:                    breath sounds clear 
bilaterally                    2015                    None              
  

 

 Full Exam - General                     Respiratory                    
respiratory effort/rhythm                    Overall:                    no 
retractions                    2015                    None              
  

 

 Full Exam - General                     Respiratory                    
respiratory effort/rhythm                    Overall:                    normal 
rate                    2015                    None                

 

 Full Exam - General                     Cardiovascular                    
extremities                    Overall:                    no clubbing         
           2015                    None                

 

 Full Exam - General                     Cardiovascular                    
auscultation of heart                    Overall:                    regular 
rate                    2015                    None                

 

 Full Exam - General                     Cardiovascular                    
auscultation of heart                    Overall:                    normal 
heart sounds                    2015                    None             
   

 

 Full Exam - General                     Abdomen                    
abdominal exam                    Overall:                    no tenderness    
                2015                    None                

 

 Full Exam - General                     Abdomen                    
abdominal exam                    Overall:                    normal bowel 
sounds                    2015                    None                

 

 Full Exam - General                     Lymphatic                    neck 
nodes                    Overall:                    anterior cervical chain 
benign                    2015                    None                

 

 Full Exam - General                     Lymphatic                    neck 
nodes                    Overall:                    posterior cervical chain 
benign                    2015                    None                

 

 Full Exam - General                     Musculoskeletal                    
spine, ribs and pelvis                    Overall:                    spine 
benign                    2015                    None                

 

 Full Exam - General                     Musculoskeletal                    
spine, ribs and pelvis                    Overall:                    
sacroiliac joint benign                    2015                    None  
              

 

 Full Exam - General                     Musculoskeletal                    
spine, ribs and pelvis                    Overall:                    good 
posture                    2015                    None                

 

 Full Exam - General                     Musculoskeletal                    
head and neck                    Overall:                    head atraumatic   
                 2015                    None                

 

 Full Exam - General                     Musculoskeletal                    
head and neck                    Overall:                    cervical spine 
benign                    2015                    None                

 

 Full Exam - General                     Neurologic                    deep 
tendon reflexes                    Overall:                    deep tendon 
reflexes intact                    2015                    None          
      

 

 Full Exam - General                     Neurologic                    
cranial nerves                    Overall:                    crainial nerves 2 
- 12 grossly intact                    2015                    None      
          

 

 Full Exam - General                     Psychiatric                    
orientation/consciousness                    Overall:                    
oriented to person, place and time                    2015               
     None                

 

 Full Exam - General                     Psychiatric                    
mood and affect                    Overall:                    normal mood and 
affect                    2015                    None                

 

 Full Exam - General                     Ears/Nose/Throat                  
  external ear                    Overall:                    normal appearance
                    2015                    None                

 

 Full Exam - General                     Ears/Nose/Throat                  
  external nose                    Overall:                    benign 
appearance                    2015                    None                

 

 Full Exam - General                     Ears/Nose/Throat                  
  hearing assessment                    Overall:                    hearing 
intact bilaterally                    2015                    None       
         

 

 Full Exam - General                     Ears/Nose/Throat                  
  lips/teeth/gingiva                    Overall:                    benign 
gingiva                    2015                    None                

 

 Full Exam - General                     Neck                    thyroid   
                 Overall:                    no mass lesions                    
2015                    None                

 

 Full Exam - General                     Neck                    thyroid   
                 Overall:                    nontender                    2015                    None                

 

 Full Exam - General                     Cardiovascular                    
inspection of carotid pulses                    Overall:                    
strong, bilaterally equal, no bruits                    2015             
       None                

 

 Full Exam - General                     Cardiovascular                    
auscultation of heart                    Overall:                    no murmurs
                    2015                    None                

 

 Full Exam - General                     Abdomen                    liver 
and spleen exam                    Overall:                    no 
hepatosplenomegaly                    2015                    None       
         

 

 Full Exam - General                     Musculoskeletal                    
digits and nails                    Digits:                    a normal exam   
                 2015                    None                

 

 Full Exam - General                     Musculoskeletal                    
gait and station                    Overall:                    normal gait    
                2015                    None                

 

 Full Exam - General                     Musculoskeletal                    
gait and station                    Overall:                    normal station 
                   2015                    None                

 

 Full Exam - General                     Integument                    
inspection of skin                    Overall:                    few scattered 
moles, no gross abnormalities                    2015                    
dermatofibroma of leg, darkening of skin on lower back.                

 

 Full Exam - General                     Constitutional                     
general appearance                    Overall:                    well 
nourished                    2013                    None                

 

 Full Exam - General                     Constitutional                     
general appearance                    Overall:                    well 
developed                    2013                    None                

 

 Full Exam - General                     Constitutional                     
general appearance                    Overall:                    in no acute 
distress                    2013                    None                

 

 Full Exam - General                     Psychiatric                    
orientation/consciousness                    Overall:                    
oriented to person, place and time                    2013               
     None                

 

 Full Exam - General                     Psychiatric                    
mood and affect                    Mood:                    happy              
      2013                    None                

 

 Full Exam - General                     Psychiatric                    
mood and affect                    Overall:                    normal mood and 
affect                    2013                    None                

 

 Full Exam - General                     Musculoskeletal                    
gait and station                    Overall:                    normal gait    
                2013                    None                

 

 Full Exam - General                     Musculoskeletal                    
gait and station                    Overall:                    normal station 
                   2013                    None                

 

 Full Exam - General                     Cardiovascular                    
auscultation of heart                    Overall:                    regular 
rate                    2013                    None                

 

 Full Exam - General                     Cardiovascular                    
auscultation of heart                    Overall:                    normal 
heart sounds                    2013                    None             
   

 

 Full Exam - General                     Cardiovascular                    
auscultation of heart                    Overall:                    no murmurs
                    2013                    None                

 

 Full Exam - General                     Respiratory                    
respiratory effort/rhythm                    Overall:                    normal 
rate                    2013                    None                

 

 Full Exam - General                     Respiratory                    
respiratory effort/rhythm                    Overall:                    no 
retractions                    2013                    None              
  

 

 Full Exam - General                     Respiratory                    
auscultation                    Overall:                    breath sounds clear 
bilaterally                    2013                    None              
  

 

 Full Exam - General                     Musculoskeletal                    
lower extremity                    Inspection -  ankle:                    a 
normal exam                    2013                    None              
  

 

 Full Exam - General                     Musculoskeletal                    
lower extremity                    Palpation -  ankle:                    a 
normal exam                    2013                    None              
  

 

 Full Exam - General                     Musculoskeletal                    
lower extremity                    Palpation -  ankle:                    
tender @ achilles tendon                    2013                    None 
               

 

 Full Exam - General                     Musculoskeletal                    
lower extremity                    Palpation -  ankle:                    
tender @ plantar fascia insertion                    2013                
    None                

 

 Full Exam - General                     Ears/Nose/Throat                  
  oral cavity/pharynx/larynx                     Overall:                    
oropharyngeal mucosa clear                    2013                    
None                

 

 Full Exam - General 1995                    Ears/Nose/Throat                  
  oral cavity/pharynx/larynx                     Overall:                    no 
masses                    2013                    None                

 

 Full Exam - General 1995                    Ears/Nose/Throat                  
  oral cavity/pharynx/larynx                     Overall:                    
oral mucosa clear                    2013                    None        
        

 

 Full Exam - General                     Ears/Nose/Throat                  
  otoscopic exam                    Overall:                    tympanic 
membranes clear                    2013                    None          
      

 

 Full Exam - General                     Ears/Nose/Throat                  
  otoscopic exam                    Overall:                    external 
auditory canals clear                    2013                    None    
            

 

 Full Exam - General                     Eyes                    pupils and 
irises                    Overall:                    pupils equal, round, 
reactive to light and accomodation                    2013               
     None                

 

 Full Exam - General                     Constitutional                     
general appearance                    Overall:                    well 
developed                    2013                    None                

 

 Full Exam - General                     Constitutional                     
general appearance                    Overall:                    in no acute 
distress                    2013                    None                

 

 Full Exam - General                     Constitutional                     
general appearance                    Overall:                    well 
nourished                    2013                    None                

 

 Full Exam - General                     Respiratory                    
auscultation                    Overall:                    breath sounds clear 
bilaterally                    2013                    None              
  

 

 Full Exam - General                     Respiratory                    
respiratory effort/rhythm                    Overall:                    no 
retractions                    2013                    None              
  

 

 Full Exam - General                     Respiratory                    
respiratory effort/rhythm                    Overall:                    normal 
rate                    2013                    None                

 

 Full Exam - General                     Cardiovascular                    
auscultation of heart                    Overall:                    regular 
rate                    2013                    None                

 

 Full Exam - General                     Cardiovascular                    
auscultation of heart                    Overall:                    normal 
heart sounds                    2013                    None             
   

 

 Full Exam - General                     Cardiovascular                    
auscultation of heart                    Overall:                    no murmurs
                    2013                    None                

 

 Full Exam - General                     Integument                    
inspection of skin                    Dermatitis:                    erythema  
                  2013                    None                

 

 Full Exam - General                     Integument                    
inspection of skin                    Dermatitis:                    dryness/
flaking                    2013                    None                

 

 Full Exam - General                     Integument                    
inspection of skin                    Dermatitis:                    scaling   
                 2013                    None                

 

 Full Exam - General                     Integument                    
inspection of skin                    Dermatitis:                    thickened 
                   2013                    None                

 

 Full Exam - General                     Integument                    
inspection of skin                    Dermatitis:                    
lichenification                    2013                    None          
      

 

 Full Exam - General                     Integument                    
inspection of skin                    Location:                    right arm   
                 2013                    None                

 

 Full Exam - General                     Neurologic                    gait
                    Overall:                    no ataxia, no unsteadiness     
               2013                    None                

 

 Full Exam - General                     Psychiatric                    
orientation/consciousness                    Overall:                    
oriented to person, place and time                    2013               
     None                

 

 Full Exam - General                     Psychiatric                    
mood and affect                    Overall:                    normal mood and 
affect                    2013                    None                

 

 Full Exam - General                     Psychiatric                    
mood and affect                    Mood:                    happy              
      2013                    None                

 

 Full Exam - General                     Integument                    
inspection of skin                    Location:                    left hand   
                 2013                    None                

 

 Full Exam - General                     Integument                    
inspection of skin                    Location:                    right hand  
                  2013                    None                

 

 Full Exam - General                     Constitutional                     
general appearance                    Overall:                    well 
developed                    2012                    None                

 

 Full Exam - General                     Constitutional                     
general appearance                    Overall:                    in no acute 
distress                    2012                    None                

 

 Full Exam - General                     Constitutional                     
general appearance                    Overall:                    well 
nourished                    2012                    None                

 

 Full Exam - General                     Respiratory                    
auscultation                    Overall:                    breath sounds clear 
bilaterally                    2012                    None              
  

 

 Full Exam - General                     Respiratory                    
respiratory effort/rhythm                    Overall:                    no 
retractions                    2012                    None              
  

 

 Full Exam - General                     Respiratory                    
respiratory effort/rhythm                    Overall:                    normal 
rate                    2012                    None                

 

 Full Exam - General                     Cardiovascular                    
auscultation of heart                    Overall:                    regular 
rate                    2012                    None                

 

 Full Exam - General 1995                    Cardiovascular                    
auscultation of heart                    Overall:                    normal 
heart sounds                    2012                    None             
   

 

 Full Exam - General                     Cardiovascular                    
auscultation of heart                    Overall:                    no murmurs
                    2012                    None                

 

 Full Exam - General 1995                    Integument                    
inspection of skin                    Dermatitis:                    erythema  
                  2012                    None                

 

 Full Exam - General 1995                    Integument                    
inspection of skin                    Dermatitis:                    dryness/
flaking                    2012                    None                

 

 Full Exam - General                     Integument                    
inspection of skin                    Dermatitis:                    scaling   
                 2012                    None                

 

 Full Exam - General                     Integument                    
inspection of skin                    Dermatitis:                    thickened 
                   2012                    None                

 

 Full Exam - General                     Integument                    
inspection of skin                    Dermatitis:                    
lichenification                    2012                    None          
      

 

 Full Exam - General                     Integument                    
inspection of skin                    Dermatitis:                    
excoriation                    2012                    over dorsum of the 
right hand                

 

 Full Exam - General                     Neurologic                    gait
                    Overall:                    no ataxia, no unsteadiness     
               2012                    None                

 

 Full Exam - General                     Psychiatric                    
orientation/consciousness                    Overall:                    
oriented to person, place and time                    2012               
     None                

 

 Full Exam - General                     Psychiatric                    
mood and affect                    Overall:                    normal mood and 
affect                    2012                    None                

 

 Full Exam - General                     Psychiatric                    
mood and affect                    Mood:                    happy              
      2012                    None                

 

 Full Exam - General                     Ears/Nose/Throat                  
  otoscopic exam                    Overall:                    external 
auditory canals clear                    2012                    None    
            

 

 Full Exam - General 1995                    Ears/Nose/Throat                  
  otoscopic exam                    Overall:                    tympanic 
membranes clear                    2012                    None          
      

 

 Full Exam - General 1995                    Ears/Nose/Throat                  
  oral cavity/pharynx/larynx                     Overall:                    
oral mucosa clear                    2012                    None        
        

 

 Full Exam - General                     Integument                    
inspection of skin                    Dermatitis:                    scaling   
                 2012                    None                

 

 Full Exam - General                     Integument                    
inspection of skin                    Dermatitis:                    thickened 
                   2012                    None                

 

 Full Exam - General                     Integument                    
inspection of skin                    Dermatitis:                    
lichenification                    2012                    None          
      

 

 Full Exam - General                     Neurologic                    gait
                    Overall:                    no ataxia, no unsteadiness     
               2012                    None                

 

 Full Exam - General                     Psychiatric                    
orientation/consciousness                    Overall:                    
oriented to person, place and time                    2012               
     None                

 

 Full Exam - General                     Psychiatric                    
mood and affect                    Overall:                    normal mood and 
affect                    2012                    None                

 

 Full Exam - General                     Psychiatric                    
mood and affect                    Mood:                    happy              
      2012                    None                

 

 Full Exam - General                     Integument                    
inspection of skin                    Location:                    right arm   
                 2012                    None                

 

 Full Exam - General                     Integument                    
inspection of skin                    Location:                    left arm    
                2012                    None                

 

 Full Exam - General                     Constitutional                     
general appearance                    Overall:                    well 
developed                    2012                    None                

 

 Full Exam - General                     Constitutional                     
general appearance                    Overall:                    in no acute 
distress                    2012                    None                

 

 Full Exam - General                     Constitutional                     
general appearance                    Overall:                    well 
nourished                    2012                    None                

 

 Full Exam - General                     Respiratory                    
auscultation                    Overall:                    breath sounds clear 
bilaterally                    2012                    None              
  

 

 Full Exam - General                     Respiratory                    
respiratory effort/rhythm                    Overall:                    no 
retractions                    2012                    None              
  

 

 Full Exam - General                     Respiratory                    
respiratory effort/rhythm                    Overall:                    normal 
rate                    2012                    None                

 

 Full Exam - General                     Cardiovascular                    
auscultation of heart                    Overall:                    regular 
rate                    2012                    None                

 

 Full Exam - General                     Cardiovascular                    
auscultation of heart                    Overall:                    normal 
heart sounds                    2012                    None             
   

 

 Full Exam - General                     Cardiovascular                    
auscultation of heart                    Overall:                    no murmurs
                    2012                    None                

 

 Full Exam - General                     Integument                    
inspection of skin                    Dermatitis:                    erythema  
                  2012                    None                

 

 Full Exam - General                     Integument                    
inspection of skin                    Dermatitis:                    dryness/
flaking                    2012                    None                

 

 Full Exam - General                     Psychiatric                    
orientation/consciousness                    Overall:                    
oriented to person, place and time                    2012               
     None                

 

 Full Exam - General                     Psychiatric                    
mood and affect                    Mood:                    happy              
      2012                    None                

 

 Full Exam - General                     Psychiatric                    
mood and affect                    Overall:                    normal mood and 
affect                    2012                    None                

 

 Full Exam - General                     Neurologic                    gait
                    Overall:                    no ataxia, no unsteadiness     
               2012                    None                

 

 Full Exam - General                     Integument                    
inspection of skin                    Dermatitis:                    erythema  
                  2012                    None                

 

 Full Exam - General                     Integument                    
inspection of skin                    Dermatitis:                    dryness/
flaking                    2012                    None                

 

 Full Exam - General                     Integument                    
inspection of skin                    Dermatitis:                    scaling   
                 2012                    None                

 

 Full Exam - General                     Integument                    
inspection of skin                    Dermatitis:                    thickened 
                   2012                    None                

 

 Full Exam - General                     Integument                    
inspection of skin                    Dermatitis:                    
lichenification                    2012                    None          
      

 

 Full Exam - General                     Integument                    
inspection of skin                    Dermatitis:                    
excoriation                    2012                    over dorsum of the 
right hand and over the right mid abdomen                

 

 Full Exam - General                     Integument                    
palpation                    Hand:                    tender                    
2012                    None                

 

 Full Exam - General                     Cardiovascular                    
auscultation of heart                    Overall:                    regular 
rate                    2012                    None                

 

 Full Exam - General                     Cardiovascular                    
auscultation of heart                    Overall:                    normal 
heart sounds                    2012                    None             
   

 

 Full Exam - General                     Cardiovascular                    
auscultation of heart                    Overall:                    no murmurs
                    2012                    None                

 

 Full Exam - General                     Respiratory                    
auscultation                    Overall:                    breath sounds clear 
bilaterally                    2012                    None              
  

 

 Full Exam - General                     Respiratory                    
respiratory effort/rhythm                    Overall:                    normal 
rate                    2012                    None                

 

 Full Exam - General                     Respiratory                    
respiratory effort/rhythm                    Overall:                    no 
retractions                    2012                    None              
  

 

 Full Exam - General                     Constitutional                     
general appearance                    Overall:                    well 
nourished                    2012                    None                

 

 Full Exam - General                     Constitutional                     
general appearance                    Overall:                    well 
developed                    2012                    None                

 

 Full Exam - General                     Constitutional                     
general appearance                    Overall:                    in no acute 
distress                    2012                    None                



                                                                              



Procedures

                      





 Procedure                    Codes                    Date                

 

                     TRIAMCINOLONE ACET INJ NOS                                
      CPT-4:                     2017                

 

                     TRIAMCINOLONE ACET INJ NOS                                
      CPT-4:                     05/15/2017                

 

                     ROCEPHIN,  MG                                      
CPT-4:                     2012                

 

                     KETOROLAC TROMETHAMINE INJ                                
      CPT-4:                     2012                

 

                     THER/PROPH/DIAG INJ SC/IM                                 
     CPT-4: 55473                    2012                

 

                     TRIAMCINOLONE ACET INJ NOS                                
      CPT-4:                     2012                

 

                     THER/PROPH/DIAG INJ SC/IM                                 
     CPT-4: 78491                    2012                



                                                                               
                                                           



Vital Signs

                      





 Date                    Vital                









 2018                                        Blood Pressure 1: 130/70 Code
: 8480-6                                                          BMI: 26.9 Code
: 22597-3                                                          Heart Rate 1
: 83 bpm                                                          Height: 6'2" 
                                                         SpO2: 98%             
                                             Weight: 212 lbs                   
                

 

 2017                                        Blood Pressure 1: 122/74 Code
: 8480-6                                                          BMI: 28.1 Code
: 59346-2                                                          Heart Rate 1
: 88 bpm                                                          Height: 6'2" 
                                                         SpO2: 99%             
                                             Weight: 222 lbs                   
                









 05/15/2017                                        Blood Pressure 1: 128/80 Code
: 8480-6                                                          BMI: 29.4 Code
: 49784-3                                                          Heart Rate 1
: 92 bpm                                                          Height: 6'2" 
                                                         SpO2: 98%             
                                             Temperature: 36.6 (C) / 97.8 (F)  
                                                        Weight: 232 lbs        
                           









 2017                                        Blood Pressure 1: 124/78 Code
: 8480-6                                                          BMI: 29.4 Code
: 83159-4                                                          Heart Rate 1
: 78 bpm                                                          Height: 6'2" 
                                                         SpO2: 96%             
                                             Weight: 232 lbs                   
                

 

 2017                                        Blood Pressure 1: 128/82 Code
: 8480-6                                                          BMI: 31.0 Code
: 09376-6                                                          Heart Rate 1
: 80 bpm                                                          Height: 6'2" 
                                                         SpO2: 98%             
                                             Weight: 245 lbs                   
                

 

 06/15/2016                                        Blood Pressure 1: 126/74 Code
: 8480-6                                                          BMI: 29.9 Code
: 95438-2                                                          Heart Rate 1
: 71 bpm                                                          Height: 6'2" 
                                                         SpO2: 98%             
                                             Weight: 236 lbs                   
                









 2015                                        Blood Pressure 1: 102/74 Code
: 8480-6                                                          BMI: 30.4 Code
: 68117-6                                                          Heart Rate 1
: 76 bpm                                                          Height: 6'2" 
                                                         Weight: 240 lbs       
                            









 2013                                        Blood Pressure 1: 122/82 Code
: 8480-6                                                          BMI: 32.8 Code
: 62364-3                                                          Heart Rate 1
: 72 bpm                                                          Height: 6'   
                                                       Respiratory Rate: 16 bpm
                                                          Weight: 245 lbs      
                             









 2013                                        Blood Pressure 1: 126/74 Code
: 8480-6                                                          BMI: 31.6 Code
: 29280-2                                                          Heart Rate 1
: 80 bpm                                                          Height: 6'2" 
                                                         Weight: 246 lbs       
                            

 

 2012                                        Blood Pressure 1: 126/74 Code
: 8480-6                                                          Heart Rate 1: 
72 bpm                                                          Respiratory Rate
: 16 bpm                                                          Temperature: 
36.8 (C) / 98.3 (F)                                                          
Weight: 240 lbs                                   

 

 2012                                        Blood Pressure 1: 130/80 Code
: 8480-6                                                          BMI: 30.1 Code
: 44125-5                                                          Heart Rate 1
: 62 bpm                                                          Height: 6'2" 
                                                         Weight: 237 lbs 8 oz  
                                









 2012                                        Blood Pressure 1: 140/72 Code
: 8480-6                                                          BMI: 32.1 Code
: 66227-9                                                          Heart Rate 1
: 68 bpm                                                          Height: 6'   
                                                       Respiratory Rate: 16 bpm
                                                          Weight: 237 lbs      
                             



                                                                               
                                                                               
                                        



Functional Status

          No Functional Status data                                            
              



History of Present Illness

                      





 Symptom Name                    Status                    Result              
      Effective Date                    Notes                

 

 dizziness                    Quality                    constant              
      2018                    None                

 

 dizziness                    Quality                    acute                 
   2018                    None                

 

 dizziness                    Onset and Resolution                    sudden in 
onset                    2018                    None                

 

 dizziness                    Onset of Symptom                    5 days ago   
                 2018                    None                

 

 dizziness                    Frequency of Episodes                    daily   
                 2018                    None                

 

 dizziness                    Triggers                    no known associated 
factors                    2018                    None                

 

 dizziness                    Pertinent Findings                    nausea     
               2018                    None                

 

 wrist pain                    Location                    on the left         
           2017                    None                

 

 wrist pain                    Quality                    constant             
       2017                    None                

 

 wrist pain                    Onset and Resolution                    sudden 
in onset                    2017                    None                

 

 wrist pain                    Onset of Symptom                    24 hours ago
                    2017                    None                

 

 cough                    Location                    in the lung              
      05/15/2017                    None                

 

 cough                    Quality                    constant                  
  05/15/2017                    None                

 

 cough                    Quality                    hacking                    
05/15/2017                    None                

 

 cough                    Quality                    productive                
    05/15/2017                    None                

 

 cough                    Onset and Resolution                    sudden in 
onset                    05/15/2017                    None                

 

 cough                    Onset of Symptom                    2 weeks ago      
              05/15/2017                    None                

 

 cough                    Frequency of Episodes                    daily       
             05/15/2017                    None                

 

 cough                    Pertinent Findings                    Denies chest 
discomfort                    05/15/2017                    None                

 

 cough                    Pertinent Findings                    hoarseness     
               05/15/2017                    None                

 

 cough                    Pertinent Findings                    nasal 
congestion                    05/15/2017                    None                

 

 cough                    Pertinent Findings                    sputum 
production                    05/15/2017                    None                

 

 sinus congestion                    Location                    frontal 
sinuses                    05/15/2017                    None                

 

 sinus congestion                    Quality                    pressure       
             05/15/2017                    None                

 

 sinus congestion                    Onset and Resolution                    
sudden in onset                    05/15/2017                    None          
      

 

 sinus congestion                    Onset of Symptom                    2 
weeks ago                    05/15/2017                    None                

 

 sinus congestion                    Frequency of Episodes                    
daily                    05/15/2017                    None                

 

 sinus congestion                    Pertinent Findings                    
cough                    05/15/2017                    None                

 

 sinus congestion                    Pertinent Findings                    
hoarseness                    05/15/2017                    None                

 

 dizziness                    Quality                    acute                 
   2017                    None                

 

 dizziness                    Onset and Resolution                    sudden in 
onset                    2017                    None                

 

 dizziness                    Limitation on Activities                    
moderately limits activities                    2017                    
None                

 

 dizziness                    Triggers                    head turning         
           2017                    None                

 

 dizziness                    Pertinent Findings                    blurred 
vision                    2017                    None                

 

 dizziness                    Pertinent Findings                    
lightheadedness                    2017                    None          
      

 

 dizziness                    Exacerbating Factors                    turning 
the head                    2017                    None                

 

 dizziness                    Exacerbating Factors                    
medication                    2017                    meloxicam          
      

 

 dizziness                    Onset of Symptom                    5 days ago   
                 2017                    None                

 

 dizziness                    Frequency of Episodes                    
decreasing                    2017                    None                

 

 wrist pain                    Location                    on the right        
            2017                    None                

 

 wrist pain                    Quality                    acute                
    2017                    None                

 

 wrist pain                    Limitation on Activities                    
moderately limits activities                    2017                    
None                

 

 wrist pain                    Pertinent Findings                    Denies 
numbness                    2017                    None                

 

 wrist pain                    Pertinent Findings                    redness   
                 2017                    None                

 

 wrist pain                    Pertinent Findings                    stiffness 
                   2017                    None                

 

 wrist pain                    Pertinent Findings                    swelling  
                  2017                    None                

 

 ankle pain                    Location                    on the right        
            06/15/2016                    None                

 

 ankle pain                    Quality                    dull pain            
        06/15/2016                    None                

 

 ankle pain                    Quality                    aching               
     06/15/2016                    None                

 

 ankle pain                    Location                    achilles tendon     
               06/15/2016                    None                

 

 ankle pain                    Onset and Resolution                    sudden 
in onset                    06/15/2016                    None                

 

 ankle pain                    Onset of Symptom                    2 weeks ago 
                   06/15/2016                    None                

 

 ankle pain                    Frequency of Episodes                    daily  
                  06/15/2016                    None                

 

 ankle pain                    Sports Participation                    
basketball                    06/15/2016                    None                

 

 ankle pain                    Sports Participation                    baseball
                    06/15/2016                    None                

 

 ankle pain                    Sports Participation                    running 
                   06/15/2016                    None                

 

 ankle pain                    Pertinent Findings                    stiffness 
                   06/15/2016                    None                

 

 ankle pain                    Pertinent Findings                    pain with 
movement                    06/15/2016                    None                

 

 well man exam (18-39 years)                    Birth Control                  
  male condom                    06/15/2016                    None            
    

 

 well man exam (18-39 years)                    Nutrition and Exercise         
           normal weight                    06/15/2016                    None 
               

 

 well man exam (18-39 years)                    Nutrition and Exercise         
           balanced nutrition                    06/15/2016                    
None                

 

 well man exam (18-39 years)                    Nutrition and Exercise         
           regular diet                    06/15/2016                    None  
              

 

 well man exam (18-39 years)                    Nutrition and Exercise         
           moderate exercise                    06/15/2016                    
None                

 

 skin lesion                    Onset of Symptom                    _ years ago
                    2015                    None                

 

 skin lesion                    Quality                    flat                
    2015                    Denies pain.                  

 

 well man exam (18-39 years)                    Sexual Activity                
    experiences sexual satisfaction                    2015              
      None                

 

 well man exam (18-39 years)                    Sexual Activity                
    is monogamous                    2015                    None        
        

 

 well man exam (18-39 years)                    Birth Control                  
  regular use                    2015                    None            
    

 

 well man exam (18-39 years)                    Lifestyle                    
family supportive of relationship                    2015                
    None                

 

 well man exam (18-39 years)                    Lifestyle                    
normal amount of stress                    2015                    None  
              

 

 well man exam (18-39 years)                    Lifestyle                    
normal sleep patterns                    2015                    None    
            

 

 well man exam (18-39 years)                    Lifestyle                    
regular seatbelt use                    2015                    None     
           

 

 well man exam (18-39 years)                    Lifestyle                    
satisfactory marriage/partner relationship                    2015       
             None                

 

 well man exam (18-39 years)                    Lifestyle                    
satisfactory work experience                    2015                    
None                

 

 well man exam (18-39 years)                    Nutrition and Exercise         
           balanced nutrition                    2015                    
None                

 

 well man exam (18-39 years)                    Nutrition and Exercise         
           moderate exercise                    2015                    
None                

 

 well man exam (18-39 years)                    Reproductive System Development
                    normal puberty                    2015               
     None                

 

 well man exam (18-39 years)                    Health Guidance                
    cholesterol level and lipid panel                    2015            
        None                

 

 well man exam (18-39 years)                    Health Guidance                
    regular exercise                    2015                    None     
           

 

 well man exam (18-39 years)                    Health Guidance                
    safety belt use                    2015                    None      
          

 

 well man exam (18-39 years)                    Health Guidance                
    tobacco, drugs and alcohol avoidance                    2015         
           None                

 

 ankle pain                    Location                    on the right        
            2013                    None                

 

 ankle pain                    Quality                    sharp pain           
         2013                    alternating dull pain.  states it is 
worse after sitting and getting up and walking agin                

 

 ankle pain                    Pertinent Findings                    Denies 
catching                    2013                    None                

 

 ankle pain                    Pertinent Findings                    Denies 
clicking                    2013                    None                

 

 ankle pain                    Pertinent Findings                    Denies 
numbness                    2013                    None                

 

 ankle pain                    Pertinent Findings                    Denies 
redness                    2013                    None                

 

 ankle pain                    Pertinent Findings                    stiffness 
                   2013                    None                

 

 ankle pain                    Pertinent Findings                    Denies 
swelling                    2013                    None                

 

 ankle pain                    Onset of Symptom                    2-3 weeks 
ago                    2013                    states he thinks he 
originally injured it while playing football with friends last fall            
    

 

 ankle pain                    Severity                    mild                
    2013                    None                

 

 ankle pain                    Significant Medical Conditions                  
  degenerative joint disease                    2013                    
None                

 

 skin lesion                    Quality                    chronic             
       2013                    None                

 

 skin lesion                    Quality                    flat                
    2013                    None                

 

 skin lesion                    Quality                    non-tender          
          2013                    None                

 

 skin lesion                    Quality                    worsening           
         2013                    None                

 

 skin lesion                    Location                    left arm           
         2013                    also on hands bilat.                

 

 skin lesion                    Onset and Resolution                    ongoing
                    2013                    None                

 

 skin lesion                    Pertinent Findings                    Denies 
cough                    2013                    None                

 

 skin lesion                    Pertinent Findings                    Denies 
ecchymotic                    2013                    None                

 

 skin lesion                    Alleviating Factors                    
medication                    2013                    had ointment for 
lesions that helps temporarily                

 

 skin lesion                    Severity                    moderate           
         2013                    None                

 

 skin lesion                    Frequency of Episodes                    
unchanged                    2013                    None                

 

 skin lesion                    Triggers                    no known associated 
factors                    2013                    None                

 

 headache                    Onset and Resolution                    sudden in 
onset                    2012                    None                

 

 headache                    Onset of Symptom                    3 days ago    
                2012                    None                

 

 sore throat                    Quality                    acute               
     2012                    None                

 

 sore throat                    Pertinent Findings                    cough    
                2012                    None                

 

 rash                    Location-Major                    on the hands        
            2012                    on right hand                

 

 rash                    Onset of Symptom                    1 month ago       
             2012                    recurrence of former rash, using 
itrakonazole with no results                

 

 headache                    Location                    diffusely             
       2012                    None                

 

 headache                    Quality                    acute                  
  2012                    None                

 

 headache                    Onset and Resolution                    ongoing   
                 2012                    None                

 

 headache                    Limitation on Activities                    does 
not limit activities                    2012                    None     
           

 

 headache                    Frequency of Episodes                    
increasing                    2012                    None                

 

 headache                    Significant Medical Conditions                    
allergic rhinitis                    2012                    None        
        

 

 headache                    Triggers                    no known associated 
factors                    2012                    None                

 

 sore throat                    Location                    diffusely          
          2012                    None                

 

 sore throat                    Onset and Resolution                    ongoing
                    2012                    None                

 

 sore throat                    Limitation on Activities                    
does not limit oral intake                    2012                    
None                

 

 sore throat                    Significant Medical Conditions                 
   allergic rhinitis                    2012                    None     
           

 

 sore throat                    Triggers                    no known associated 
factors                    2012                    None                

 

 flare up of rash                    Alleviating Factors                    no 
alleviating factors                    2012                    None      
          

 

 flare up of rash                    Pertinent Findings                    
history of similar rash                    2012                    None  
              

 

 flare up of rash                    Pertinent Findings                    
itching                    2012                    None                

 

 flare up of rash                    Pertinent Findings                    
tenderness                    2012                    None                

 

 rash                    Location-Major                    on the arms         
           2012                    None                

 

 rash                    Quality                    expanding                  
  2012                    None                

 

 rash                    Color                    red                    2012                    None                

 

 flare up of rash                    Quality                    dry            
        2012                    None                

 

 flare up of rash                    Quality                    flaking        
            2012                    None                

 

 flare up of rash                    Onset and Resolution                    
gradual in onset                    2012                    None         
       

 

 flare up of rash                    Limitation on Activities                  
  does not limit activities                    2012                    
None                

 

 flare up of rash                    Severity                    mild          
          2012                    None                

 

 flare up of rash                    Severity                    worsening     
               2012                    None                

 

 flare up of rash                    Triggers                    no known 
triggers                    2012                    None                

 

 flare up of rash                    Location-Extremities                    on 
the right hand                    2012                    None           
     

 

 flare up of rash                    Quality                    dry            
        2012                    None                

 

 flare up of rash                    Quality                    flaking        
            2012                    None                

 

 flare up of rash                    Onset of Symptom                    3 
months ago                    2012                    None                

 

 flare up of rash                    Color                    black            
        2012                    None                

 

 flare up of rash                    Color                    erythematous     
               2012                    None                

 

 flare up of rash                    Onset and Resolution                    
gradual in onset                    2012                    None         
       

 

 flare up of rash                    Limitation on Activities                  
  does not limit activities                    2012                    
None                

 

 flare up of rash                    Severity                    mild          
          2012                    None                

 

 flare up of rash                    Severity                    worsening     
               2012                    None                

 

 flare up of rash                    Prior Treatments                    
partially responsive to treatment                    2012                
    None                

 

 flare up of rash                    Triggers                    no known 
triggers                    2012                    None                

 

 flare up of rash                    Alleviating Factors                    no 
alleviating factors                    2012                    None      
          

 

 flare up of rash                    Pertinent Findings                    
history of similar rash                    2012                    None  
              

 

 flare up of rash                    Pertinent Findings                    
itching                    2012                    None                

 

 flare up of rash                    Pertinent Findings                    
tenderness                    2012                    None                



                                                                    



Advance Directives

          No Advance Directive data                                            
                        



Encounters

                      





 Encounter                    Performer                    Location            
        Codes                    Date                

 

                      EST. PATIENT, LEVEL IV

Diagnosis: Abnormal weight loss[ICD10: R63.4]

Diagnosis: Benign paroxysmal vertigo, bilateral[ICD10: H81.13]

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

Diagnosis: Other malaise[ICD10: R53.81]

Diagnosis: Other fatigue[ICD10: R53.83]                                      
Franca Landrum MD, [facility]                    CPT-4
: 46538                    2018                

 

                     02102 EST. PATIENT, LEVEL III

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                                   
   Franca Landrum MD, [facility]                    CPT
-4: 02290                    2017                

 

                     43961 EST. PATIENT, LEVEL IV

Diagnosis: Acute bronchitis due to other specified organisms[ICD10: J20.8]

Diagnosis: Other allergic rhinitis[ICD10: J30.89]                              
        Franca Landrum MD, [facility]                 
   CPT-4: 06307                    05/15/2017                

 

                     ([id number]) 09896 EST. PATIENT, LEVEL III

Diagnosis: Benign paroxysmal vertigo, bilateral[ICD10: H81.13]                 
                     [facility]
                    CPT-4: 04713                    2017                

 

                     51848 EST. PATIENT, LEVEL III

Diagnosis: Pain in right wrist[ICD10: M25.531]                                 
     [facility]                    
CPT-4: 94577                    2017                

 

                     ([id number]) PREV VISIT EST AGE 18-39

Diagnosis: Encounter for general adult medical examination without abnormal 
findings[ICD10: Z00.00]                                      Meghan Landrum MD, [facility]                    CPT-4: 79976         
           06/15/2016                

 

                     ([id number]) PREV VISIT EST AGE 18-39

Diagnosis: PREVENTIVE PHYSICAL EXAM[ICD9: V70.0]                               
       Meghan Landrum MD, [facility]                
    CPT-4: 21266                    2015                

 

                     ([id number]) 96759 EST. PATIENT, LEVEL III

Diagnosis: ACHILLES TENDINITIS[ICD9: 726.71]                                   
   Meghan Landrum MD, [facility]                    
CPT-4: 15393                    2013                

 

                     ([id number]) 28654 EST. PATIENT, LEVEL III

Diagnosis: Rash[ICD9: 782.1]

Diagnosis: PRURITIC DISORDER[ICD9: 698.9]                                      
Klarissa Landrum MD, [facility]                    
CPT-4: 93582                    2013                

 

                     ([id number]) 44106 EST. PATIENT, LEVEL III

Diagnosis: ACUTE URI[ICD9: 465.9]

Diagnosis: Rash[ICD9: 782.1]                                      Klarissa Landrum MD, [facility]                    CPT-4: 
46405                    2012                

 

                     ([id number]) 70104 EST. PATIENT, LEVEL III

Diagnosis: PRURITIC DISORDER[ICD9: 698.9]

Diagnosis: NONSPECIF SKIN ERUPT NEC[ICD9: 782.1]                               
       Meghan Landrum MD, [facility]                
    CPT-4: 94891                    2012                

 

                     04865 EST. PATIENT, LEVEL IV

Diagnosis: Rash[ICD9: 782.1]

Diagnosis: Localized pruritus[ICD9: 698.9]

Diagnosis: Elevated blood pressure[ICD9: 796.2]                                
      Meghan Landrum MD, [facility]                 
   CPT-4: 16277                    2012                



                                                                               
                                                                               
                                                                      



Plan of Care

                      





 Planned Activity                    Notes                    Codes            
        Status                    Date                

 

 Visit Plan:                     BPPV - Benign Paroxysmal Positional Vertigo - 
discussed diagnosis with the patient, offered the pt the appropriate additional 
information in hand-out. Pt instructed in home exercises to help alleviate and 
prevent future recurrent episodes of vertigo. Pt informed that if symptoms 
worsen, call the office for further instructions/medication interventions. RA - 
Pt is to follow with specialist at  - pt is to notify clinic of any changes 
in current treatment plan or with any acute questions or concerns. Weight loss 
- will check labs and treat as indicated.

                                                             2018        
        

 

 Appointment: Franca Casper 

WPtel:[phone]

 [address] [facility]

                                                             (15 min) 
Moderate                    2018                

 

 Patient Education: Patient Medication Summary                                 
                            Completed                    2018            
    

 

 Visit Plan:                     RA - acute flare - will send RX - pt has an 
appointment with a new rheumatologist in January. Pt is to notify clinic if 
symptoms do not improve, if they worsen, or with any acute changes, questions, 
or concerns.

                                                             2017        
        

 

 Visit Plan:                     RA - acute flare - will send RX - pt has an 
appointment with a new rheumatologist in January. Pt is to notify clinic if 
symptoms do not improve, if they worsen, or with any acute changes, questions, 
or concerns.

                                                             2017        
        

 

 Appointment: Franca Casper 

WPtel:[phone]

 [address] [facility]

                                                             (30 min) Complex
                    2017                

 

 Patient Education: Patient Medication Summary                                 
                            Completed                    2017            
    

 

 Visit Plan:                     Bronchitis - acute case of bronchitis 
identified. Pt has been given antibiotics, steroid as appropriate, and pt has 
been instructed to call if symptoms are not improved, or if symptoms acutely 
worsen. Allergies - chronic - recommended pt to use allergy medication as 
prescribed. Pt has been counseled as to the appropriate use of the medication. 
Pt to call if allergy symptoms are not controlled with the medication. If using 
nasal spray, instructions as follows: Nasal spray- use twice daily, one spray 
per nostril twice daily, after 30 minutes, rinse out nose with saline spray.. 
Use opposite hand per nostril to spray in the nasal steroid allergy spray.

                                                             05/15/2017        
        

 

 Visit Plan:                     Bronchitis - acute case of bronchitis 
identified. Pt has been given antibiotics, steroid as appropriate, and pt has 
been instructed to call if symptoms are not improved, or if symptoms acutely 
worsen. Allergies - chronic - recommended pt to use allergy medication as 
prescribed. Pt has been counseled as to the appropriate use of the medication. 
Pt to call if allergy symptoms are not controlled with the medication. If using 
nasal spray, instructions as follows: Nasal spray- use twice daily, one spray 
per nostril twice daily, after 30 minutes, rinse out nose with saline spray.. 
Use opposite hand per nostril to spray in the nasal steroid allergy spray.

                                                             05/15/2017        
        

 

 Appointment: Franca Casper 

WPtel:[phone] [address] [facility]

[place]                                                             (15 min) 
Moderate                    05/15/2017                

 

 Patient Education: Patient Medication Summary                                 
                            Completed                    05/15/2017            
    

 

 Visit Plan:                     BPPV - Benign Paroxysmal Positional Vertigo - 
discussed diagnosis with the patient, offered the pt the appropriate additional 
information in hand-out. Pt instructed in home exercises to help alleviate and 
prevent future recurrent episodes of vertigo. Pt informed that if symptoms 
worsen, call the office for further instructions/medication interventions.

                                                             2017        
        

 

 Appointment: Klarissa Roberts 

WPtel:[phone]

 [facility]

[place]                                                             (30 min) Complex
                    2017                

 

 Patient Education: Patient Medication Summary                                 
                            Completed                    2017            
    

 

 Patient Education: .Amazing charts Paroxysmal positional vertigo              
                                               Completed                    2017                

 

 Patient Education: Obesity                                                    
         Completed                    2017                

 

 Visit Plan:                     Right wrist pain - The pt is to use prn 
antiinflammatories to manage acute pain. The patient is to call the office if 
the pain is worsening or does not improve. Intermittent and varying joint pain 
- ongoing for the past 5-6 years - will check labs

                                                             2017        
        

 

 Appointment: Franca Casper 

WPtel:[phone]

 [facility]

[place]                                                             (10 min) Simple 
                   2017                

 

 Patient Education: Patient Medication Summary                                 
                            Completed                    2017            
    

 

 Visit Plan:                     Well Adult - pt was counseled about diet, 
exercise, and encouraged to follow a heart healthy diet and increase activity 
level. The patient was instructed to RTC yearly for well adult exams and PRN 
for acute illnesses. The pt was also instructed to have yearly labs for check 
of cholesterol, thyroid, chem panel, CBC, and renal functioning.

                                                             06/15/2016        
        

 

 Appointment: Meghan Landrum 

WPtel:[phone]

 [facility]

[place]                                                             (15 min) 
Moderate                    06/15/2016                

 

 Patient Education: Patient Medication Summary                                 
                            Completed                    06/15/2016            
    

 

 Patient Education: Obesity                                                    
         Completed                    06/15/2016                

 

 Visit Plan:                     Well Adult - pt was counseled about diet, 
exercise, and encouraged to follow a heart healthy diet and increase activity 
level. The patient was instructed to RTC yearly for well adult exams and PRN 
for acute illnesses. The pt was also instructed to have yearly labs for check 
of cholesterol, thyroid, chem panel, CBC, and renal functioning.

                                                             2015        
        

 

 Appointment: Meghan Landrum 

WPtel:[phone]

 [address] [place], [place] [id number]

                                                             (15 min) 
Moderate                    2015                

 

 Patient Education: Patient Medication Summary                                 
                            Completed                    2015            
    

 

 Visit Plan:                     Achilles tendonitis - uncontrolled symptoms- 
recommend pt to take antiinflammatory as directed for pain control. Use tylenol 
for break through pain symptoms.

                                                             2013        
        

 

 Appointment: Meghan Landrum 

WPtel:[phone]

 [address] [place], [place] [id number]

                                                             Follow up       
             2013                

 

 Patient Education: Patient Medication Summary                                 
                            Completed                    2013            
    

 

 Patient Education: Achilles Tendon Injury Exercises                           
                                  Completed                    2013      
          

 

 Patient Education: Achilles Tendon Injury Exercises: Illustration             
                                                Completed                                    

 

 Visit Plan:                     Rash-chronic--plan to treat with short course 
of prednisone and monitor symptoms. Patient is to call if rash does not resolve 
completely or if any worse. Finish anti-fungal medication as well. Patient 
verbalized understanding.

                                                             2013        
        

 

 Appointment: Klarissa Roberts 

WPtel:[phone]

 [address] [place] [address]                                                             Other           
         2013                

 

 Patient Education: Patient Medication Summary                                 
                            Completed                    2013            
    

 

 Visit Plan:                     Rash-culture done today in the office-plan to 
treat with short course of prednisone and monitor symptoms. Patient is to call 
if rash does not resolve completely or if any worse. Finish anti-fungal 
medication as well. Patient verbalized understanding. URI - Pt advised to 
increase fluids, vitamin C. Discussed natural and expected course of this 
diagnosis and need to alert me if symtpoms do not follow expected course, or if 
any worse. RX sent to patient's pharmacy.Rocephin and kenalog injection today 
in the office.

                                                             2012        
        

 

 Appointment: Klarissa Roberts 

WPtel:[phone]

 [facility]

                                                             Other           
         2012                

 

 Patient Education: Patient Medication Summary                                 
                            Completed                    2012            
    

 

 Visit Plan:                     Rash - Itching - uncertain eitiology - but 
with his response to the steroid and antifungal and the diffuse look of a 
folliculitis - I will treat Jose with a different antifungal and steroid and 
follow his progress. itraconazole 100 mg x 20 days prednisone taper

                                                             2012        
        

 

 Appointment: Meghan Landrum 

WPtel:[phone]

 [address]                                                             Other           
         2012                

 

 Patient Education: Patient Medication Summary                                 
                            Completed                    2012            
    

 

 Visit Plan:                     Rash -uncertain eitiology - unable to get 
sample for reliable culture- will empirically treat with prednisone taper and 
diflucan x 7 days. Pt is to call if the rash does not improve or if it worsens. 
Elevated blood pressure - check blood pressure at home, cut back on salty foods
, call in blood pressure in one week.

                                                             2012        
        

 

 Appointment: Meghan Landrum 

WPtel:[phone]

 [address]                                                             Other           
         2012                

 

 Patient Education: Patient Medication Summary                                 
                            Completed                    2012            
    



                                                                               
                                                                               
                                                                               
                                                                               
                                                                        



Instructions

                      





 Comment                

 

                     . Rash -uncertain eitiology - unable to get sample for 
reliable culture- will empirically treat with prednisone taper and diflucan x 7 
days.

Pt is to call if the rash does not improve or if it worsens.



Elevated blood pressure - check blood pressure at home, cut back on salty foods
, call in blood pressure in one week.                                  

 

                     . RA - acute flare - will send RX - pt has an appointment 
with a new rheumatologist in January.  Pt is to notify clinic if symptoms do 
not improve, if they worsen, or with any acute changes, questions, or concerns.
                                   

 

                     . RA - acute flare - will send RX - pt has an appointment 
with a new rheumatologist in January.  Pt is to notify clinic if symptoms do 
not improve, if they worsen, or with any acute changes, questions, or concerns.
                                   

 

                     TREAT WITH PENNSAID 10 TO 15 DROPS FOUR TIMES DAILY X 2 
WEEKS.

 . Achilles tendonitis -  uncontrolled symptoms- recommend pt to take 
antiinflammatory as directed for pain control.



Use tylenol for break through pain symptoms.                                  

 

                     . Rash - Itching - uncertain eitiology - but with his 
response to the steroid and antifungal and the diffuse look of a folliculitis - 
I will treat Jose with a different antifungal and steroid and follow his 
progress.



itraconazole 100 mg x 20 days

prednisone taper                                  

 

                     . Rash-culture done today in the office-plan to treat with 
short course of prednisone and monitor symptoms. Patient is to call if rash 
does not resolve completely or if any worse. Finish anti-fungal medication as 
well. Patient verbalized understanding. 



 URI - Pt advised to increase fluids, vitamin C.  Discussed natural and 
expected course of this diagnosis and need to alert me if symtpoms do not 
follow expected course, or if any worse. RX sent to patient's pharmacy.Rocephin 
and kenalog injection today in the office.                                   

 

                     . BPPV - Benign Paroxysmal Positional Vertigo - discussed 
diagnosis with the patient, offered the pt the appropriate additional 
information in hand-out.  Pt instructed in home exercises to help alleviate and 
prevent future recurrent episodes of vertigo.  Pt informed that if symptoms 
worsen, call the office for further instructions/medication interventions.



RA - Pt is to follow with specialist at  - pt is to notify clinic of any 
changes in current treatment plan or with any acute questions or concerns. 



Weight loss - will check labs and treat as indicated.                          
        

 

                     . Well Adult - pt was counseled about diet, exercise, and 
encouraged to follow a heart healthy diet and increase activity level.  The 
patient was instructed to RTC yearly for well adult exams and PRN for acute 
illnesses.  The pt was also instructed to have yearly labs for check of 
cholesterol, thyroid, chem panel, CBC, and renal functioning.

                                  

 

                     . BPPV - Benign Paroxysmal Positional Vertigo - discussed 
diagnosis with the patient, offered the pt the appropriate additional 
information in hand-out.  Pt instructed in home exercises to help alleviate and 
prevent future recurrent episodes of vertigo.  Pt informed that if symptoms 
worsen, call the office for further instructions/medication interventions.

                                  

 

                     . Right wrist pain - The pt is to use prn 
antiinflammatories to manage acute pain. The patient is to call the office if 
the pain is worsening or does not improve. 



Intermittent and varying joint pain - ongoing for the past 5-6 years - will 
check labs                                  

 

                     steroid shot today 



prednisone 40mg daily x 2 more days, then 20mg daily x 2 days, then stop - let 
me know if your symptoms are not improving



Continue doxycycline for now - will check chest x-ray - if needed will change 
antibiotics. 



[facility] allergy medicine - I will send as a prescription, if it is cheaper over 
the counter get the over the counter (they are the same medicine and dose)



Let me know if you are not getting better.. Bronchitis - acute case of 
bronchitis identified.  Pt has been given antibiotics, steroid as appropriate, 
and pt has been instructed to call if symptoms are not improved, or if symptoms 
acutely worsen.



Allergies - chronic - recommended pt to use allergy medication as prescribed.  
Pt has been counseled as  to the appropriate use of the medication.  Pt to call 
if allergy symptoms are not controlled with the medication.

If using nasal spray, instructions as follows: Nasal spray- use twice daily, 
one spray per nostril twice daily, after 30 minutes, rinse out nose with saline 
spray.. Use opposite hand per nostril to spray in the nasal steroid allergy 
spray.

                                  

 

                     steroid shot today 



prednisone 40mg daily x 2 more days, then 20mg daily x 2 days, then stop - let 
me know if your symptoms are not improving



Continue doxycycline for now - will check chest x-ray - if needed will change 
antibiotics. 



[facility] allergy medicine - I will send as a prescription, if it is cheaper over 
the counter get the over the counter (they are the same medicine and dose)



Let me know if you are not getting better.. Bronchitis - acute case of 
bronchitis identified.  Pt has been given antibiotics, steroid as appropriate, 
and pt has been instructed to call if symptoms are not improved, or if symptoms 
acutely worsen.



Allergies - chronic - recommended pt to use allergy medication as prescribed.  
Pt has been counseled as  to the appropriate use of the medication.  Pt to call 
if allergy symptoms are not controlled with the medication.

If using nasal spray, instructions as follows: Nasal spray- use twice daily, 
one spray per nostril twice daily, after 30 minutes, rinse out nose with saline 
spray.. Use opposite hand per nostril to spray in the nasal steroid allergy 
spray.

                                  

 

                     . Rash-chronic--plan to treat with short course of 
prednisone and monitor symptoms. Patient is to call if rash does not resolve 
completely or if any worse. Finish anti-fungal medication as well. Patient 
verbalized understanding. 



                                  

 

                     retinol or cortisone on the dark skin lesion on back - 

the site on the back of your left calf is called a dermatofibroma is and benign.

 . Well Adult - pt was counseled about diet, exercise, and encouraged to follow 
a heart healthy diet and increase activity level.  The patient was instructed 
to RTC yearly for well adult exams and PRN for acute illnesses.  The pt was 
also instructed to have yearly labs for check of cholesterol, thyroid, chem 
panel, CBC, and renal functioning.

## 2018-03-06 VITALS — DIASTOLIC BLOOD PRESSURE: 81 MMHG | SYSTOLIC BLOOD PRESSURE: 129 MMHG

## 2018-03-06 VITALS — SYSTOLIC BLOOD PRESSURE: 117 MMHG | DIASTOLIC BLOOD PRESSURE: 78 MMHG

## 2018-03-06 VITALS — DIASTOLIC BLOOD PRESSURE: 67 MMHG | SYSTOLIC BLOOD PRESSURE: 107 MMHG

## 2018-03-06 LAB
ERYTHROCYTE [DISTWIDTH] IN BLOOD BY AUTOMATED COUNT: 13.6 % (ref 10–14.5)
HCT VFR BLD CALC: 43 % (ref 40–54)
HGB BLD-MCNC: 15.1 G/DL (ref 13.3–17.7)
MCH RBC QN AUTO: 30 PG (ref 25–34)
MCHC RBC AUTO-ENTMCNC: 35 G/DL (ref 32–36)
MCV RBC AUTO: 86 FL (ref 80–99)
PLATELET # BLD: 184 10^3/UL (ref 130–400)
PMV BLD AUTO: 10.4 FL (ref 7.4–10.4)
RBC # BLD AUTO: 5.01 10^6/UL (ref 4.35–5.85)
WBC # BLD AUTO: 3.4 10^3/UL (ref 4.3–11)

## 2018-03-06 RX ADMIN — METOCLOPRAMIDE SCH MG: 5 INJECTION, SOLUTION INTRAMUSCULAR; INTRAVENOUS at 22:48

## 2018-03-06 RX ADMIN — LEUCINE, PHENYLALANINE, LYSINE, METHIONINE, ISOLEUCINE, VALINE, HISTIDINE, THREONINE, TRYPTOPHAN, ALANINE, GLYCINE, ARGININE, PROLINE, SERINE, TYROSINE, SODIUM ACETATE, DIBASIC POTASSIUM PHOSPHATE, MAGNESIUM CHLORIDE, SODIUM CHLORIDE, CALCIUM CHLORIDE, DEXTROSE SCH MLS/HR
311; 238; 247; 170; 255; 247; 204; 179; 77; 880; 438; 489; 289; 213; 17; 297; 261; 51; 77; 33; 5 INJECTION INTRAVENOUS at 20:17

## 2018-03-06 RX ADMIN — SUCRALFATE SCH GM: 1 TABLET ORAL at 17:31

## 2018-03-06 RX ADMIN — SUCRALFATE SCH GM: 1 TABLET ORAL at 09:00

## 2018-03-06 RX ADMIN — LEUCINE, PHENYLALANINE, LYSINE, METHIONINE, ISOLEUCINE, VALINE, HISTIDINE, THREONINE, TRYPTOPHAN, ALANINE, GLYCINE, ARGININE, PROLINE, SERINE, TYROSINE, SODIUM ACETATE, DIBASIC POTASSIUM PHOSPHATE, MAGNESIUM CHLORIDE, SODIUM CHLORIDE, CALCIUM CHLORIDE, DEXTROSE SCH MLS/HR
311; 238; 247; 170; 255; 247; 204; 179; 77; 880; 438; 489; 289; 213; 17; 297; 261; 51; 77; 33; 5 INJECTION INTRAVENOUS at 10:16

## 2018-03-06 RX ADMIN — PANTOPRAZOLE SODIUM SCH MG: 40 INJECTION, POWDER, FOR SOLUTION INTRAVENOUS at 20:22

## 2018-03-06 RX ADMIN — SUCRALFATE SCH GM: 1 TABLET ORAL at 20:22

## 2018-03-06 RX ADMIN — ONDANSETRON PRN MG: 2 INJECTION, SOLUTION INTRAMUSCULAR; INTRAVENOUS at 14:39

## 2018-03-06 RX ADMIN — METOCLOPRAMIDE SCH MG: 5 INJECTION, SOLUTION INTRAMUSCULAR; INTRAVENOUS at 01:04

## 2018-03-06 RX ADMIN — SUCRALFATE SCH GM: 1 TABLET ORAL at 13:05

## 2018-03-06 RX ADMIN — PANTOPRAZOLE SODIUM SCH MG: 40 INJECTION, POWDER, FOR SOLUTION INTRAVENOUS at 10:16

## 2018-03-06 RX ADMIN — METOCLOPRAMIDE SCH MG: 5 INJECTION, SOLUTION INTRAMUSCULAR; INTRAVENOUS at 17:31

## 2018-03-06 RX ADMIN — METOCLOPRAMIDE SCH MG: 5 INJECTION, SOLUTION INTRAMUSCULAR; INTRAVENOUS at 12:04

## 2018-03-06 RX ADMIN — METOCLOPRAMIDE SCH MG: 5 INJECTION, SOLUTION INTRAMUSCULAR; INTRAVENOUS at 06:13

## 2018-03-06 RX ADMIN — LEUCINE, PHENYLALANINE, LYSINE, METHIONINE, ISOLEUCINE, VALINE, HISTIDINE, THREONINE, TRYPTOPHAN, ALANINE, GLYCINE, ARGININE, PROLINE, SERINE, TYROSINE, SODIUM ACETATE, DIBASIC POTASSIUM PHOSPHATE, MAGNESIUM CHLORIDE, SODIUM CHLORIDE, CALCIUM CHLORIDE, DEXTROSE SCH MLS/HR
311; 238; 247; 170; 255; 247; 204; 179; 77; 880; 438; 489; 289; 213; 17; 297; 261; 51; 77; 33; 5 INJECTION INTRAVENOUS at 01:04

## 2018-03-06 NOTE — PROGRESS NOTE-POST OPERATIVE
Post-Operative Progess Note


Surgeon (s)/Assistant (s)


Surgeon


DARRELL MENDES DO


Assistant:  na





Pre-Operative Diagnosis


nausua vomiting epigastric pain





Post-Operative Diagnosis





duodenitis, gastritis, slight esophagitis





Procedure & Operative Findings


Date of Procedure


3/6/18


Procedure Performed/Findings


egd c biopsies


Anesthesia Type


per crna





Estimated Blood Loss


Estimated blood loss (mL):  none





Specimens/Packing


Specimens Removed


antrum, body, ge











DARRELL MENDES DO Mar 6, 2018 15:57

## 2018-03-06 NOTE — DIAGNOSTIC IMAGING REPORT
INDICATION: 

Nausea.



TECHNIQUE: 

Acquisitions were acquired over the abdomen after the intravenous

injection of 5.33 mCi of technetium 99m Choletec. The ejection

fraction was calculated after the patient drank Ensure.



FINDINGS: 

There is homogeneous uptake of isotope throughout the liver.

There is significant accumulation within the gallbladder by 30

minutes. There is free flow of activity in the small bowel. The

ejection fraction was 49.7%.



IMPRESSION: 

No evidence of cystic duct obstruction with a lower limits of

normal gallbladder ejection fraction of 49.7%.



Dictated by: 



  Dictated on workstation # [id number]

## 2018-03-06 NOTE — PROGRESS NOTE (SOAP)
Subjective


Date Seen by Provider:  Mar 6, 2018


Time Seen by Provider:  08:15


Subjective/Events-last exam


PT IS A 38 Y/O MALE WHO WAS ADMITTED OBSERVATION LAST NIGHT FROM MY CLINIC.  - 

SEE OFFICE NOTE FOR H AND P.





Objective


Exam





Vital Signs








  Date Time  Temp Pulse Resp B/P (MAP) Pulse Ox O2 Delivery O2 Flow Rate FiO2


 


3/6/18 03:40 97.6 63 17 107/67 (80) 99 Room Air  


 


3/5/18 23:20 98.5 60 16 110/68 (82) 99 Room Air  


 


3/5/18 19:30 98.4 62 20 120/75 (90) 99 Room Air  


 


3/5/18 16:08 98.9 69 18 138/86 (103) 100 Room Air  














I & O 


 


 3/6/18





 07:00


 


Intake Total 100 ml


 


Balance 100 ml





Capillary Refill :





Results


Lab


Laboratory Tests


3/5/18 16:40: 


Sodium Level 138, Potassium Level 3.6, Chloride Level 107, Carbon Dioxide Level 

22, Anion Gap 9, Blood Urea Nitrogen 13, Creatinine 0.80, Estimat Glomerular 

Filtration Rate > 60, BUN/Creatinine Ratio 16, Glucose Level 93, Calcium Level 

9.9, Total Bilirubin 0.4, Aspartate Amino Transf (AST/SGOT) 15, Alanine 

Aminotransferase (ALT/SGPT) 19, Alkaline Phosphatase 51, Total Protein 7.6, 

Albumin 4.6H


3/6/18 05:16: 


White Blood Count 3.4L, Red Blood Count 5.01, Hemoglobin 15.1, Hematocrit 43, 

Mean Corpuscular Volume 86, Mean Corpuscular Hemoglobin 30, Mean Corpuscular 

Hemoglobin Concent 35, Red Cell Distribution Width 13.6, Platelet Count 184, 

Mean Platelet Volume 10.4





Assessment/Plan


Assessment/Plan


Assess & Plan/Chief Complaint


ABDOMINAL PAIN


WEIGHT LOSS


NAUSEA


EMESIS


FATIGUE





PT ADMITTED TO THE HOSPITAL LAST NIGHT - STARTED ON IV FLUIDS - CLINAMIX  WITH 

LYTES, REGLAN, ZOFRAN, PROTONIX IV AND CARAFATE.  PT TO HAVE HIDA SCAN TODAY AS 

WELL AS EGD.  IF THESE ARE NEGATIVE - THE NEXT STEP WILL BE A BARIUM SWALLOW/

SMALL BOWEL FOLLOW THROUGH TO SEE IF HIS STOMACH IS NOT WORKING CORRECTLY - 

POSSIBLE GASTROPARESIS.





Clinical Quality Measures


DVT/VTE Risk/Contraindication:


RFS Level Per Nursing on Admit:  1=Low/No VTE PPX











ANNY BLACK MD Mar 6, 2018 08:13

## 2018-03-06 NOTE — PROGRESS NOTE
Subjective


Date Seen by Provider:  Mar 6, 2018


Time Seen by Provider:  12:26


Subjective/Events-last exam


Patient was feeling a little better this morning but nausea has returned.  

Abdominal no significant change from yesterday. Having multiple loose stools.  


Denies fever sweats chills shortness of breath or chest pain.





Objective


Exam





Vital Signs








  Date Time  Temp Pulse Resp B/P (MAP) Pulse Ox O2 Delivery O2 Flow Rate FiO2


 


3/6/18 03:40 97.6 63 17 107/67 (80) 99 Room Air  


 


3/5/18 23:20 98.5 60 16 110/68 (82) 99 Room Air  


 


3/5/18 19:30 98.4 62 20 120/75 (90) 99 Room Air  


 


3/5/18 16:08 98.9 69 18 138/86 (103) 100 Room Air  














I & O 


 


 3/6/18





 06:59


 


Intake Total 100 ml


 


Balance 100 ml





Capillary Refill :


General Appearance:  No Apparent Distress


HEENT:  PERRL/EOMI


Neck:  Normal Inspection, Non Tender


Respiratory:  No Accessory Muscle Use, No Respiratory Distress


Cardiovascular:  Regular Rate, Rhythm


Gastrointestinal:  soft, tenderness (Epigastric region)


Extremity:  Normal Inspection, Non Tender


Neurologic/Psychiatric:  Alert, Oriented x3, No Motor/Sensory Deficits, Normal 

Mood/Affect, CNs II-XII Norm as Tested


Skin:  Warm/Dry


Lymphatic:  No Adenopathy





Results


Lab


Laboratory Tests


3/5/18 16:40: 


Sodium Level 138, Potassium Level 3.6, Chloride Level 107, Carbon Dioxide Level 

22, Anion Gap 9, Blood Urea Nitrogen 13, Creatinine 0.80, Estimat Glomerular 

Filtration Rate > 60, BUN/Creatinine Ratio 16, Glucose Level 93, Calcium Level 

9.9, Total Bilirubin 0.4, Aspartate Amino Transf (AST/SGOT) 15, Alanine 

Aminotransferase (ALT/SGPT) 19, Alkaline Phosphatase 51, Total Protein 7.6, 

Albumin 4.6H


3/6/18 05:16: 


White Blood Count 3.4L, Red Blood Count 5.01, Hemoglobin 15.1, Hematocrit 43, 

Mean Corpuscular Volume 86, Mean Corpuscular Hemoglobin 30, Mean Corpuscular 

Hemoglobin Concent 35, Red Cell Distribution Width 13.6, Platelet Count 184, 

Mean Platelet Volume 10.4





Assessment/Plan


ABDOMINAL PAIN


WEIGHT LOSS


NAUSEA


EMESIS


FATIGUE


DIARRHEA





hida normal, plan egd today.  stool cultures


continue medical management





Clinical Quality Measures


DVT/VTE Risk/Contraindication:


RFS Level Per Nursing on Admit:  1=Low/No VTE PPX











DARRELL MENDES DO Mar 6, 2018 12:29

## 2018-03-06 NOTE — OPERATIVE REPORT
DATE OF SERVICE:  03/06/2018



PREOPERATIVE DIAGNOSES:

Nausea, vomiting, epigastric abdominal pain.



POSTOPERATIVE DIAGNOSES:

Duodenitis, gastritis _____ esophagitis.



PROCEDURE:

EGD with biopsy.



SURGEON:

Darrell Miner DO



ANESTHESIA:

Per CRNA.



ESTIMATED BLOOD LOSS:

None.



COMPLICATIONS:

None.



INDICATIONS:

The patient is a 37-year-old male who has been having epigastric abdominal pain,

intractable nausea and vomiting.  He understands risks and benefits of procedure

and wished to proceed with procedure.  Consent was signed in the chart.



PROCEDURE:

The patient was taken to the endoscopy suite, placed in left lateral recumbent

position.  Timeout was performed.  Scope was inserted in mouth, down the

esophagus, stomach and into the duodenum without difficulty.  There is some

slight erythematous changes were in the first portion of the duodenum.  There

are no polyps, mass or ulcerations.  The scope was then slowly retracted back

into the stomach where there are erythematous changes diffusely.  Biopsies of

the antrum were obtained.  Scope was retroflexed noting no other pathology. 

Scope was slowly retracted in the proximal portion.  There is also an area of

erythematous changes of the body of the stomach, which biopsy was obtained as

well.  Scope was slowly retracted back into the distal esophagus, which had just

some slight erythema, no polyps, mass or ulcerations.  Biopsy of the GE junction

was obtained.  Scope was slowly retracted back to completely remove.  The

patient tolerated procedure well without complications, taken to recovery room

in stable condition.



RECOMMENDATIONS:

Continue medical management on the Protonix and Carafate.  We will discuss with

Dr. Landrum.





Job ID: [id number]

DocumentID: [id number]

Dictated Date:  03/06/2018 15:59:45

Transcription Date: 03/06/2018 23:17:18

Dictated By: DARRELL MINER DO

## 2018-03-07 VITALS — DIASTOLIC BLOOD PRESSURE: 84 MMHG | SYSTOLIC BLOOD PRESSURE: 130 MMHG

## 2018-03-07 VITALS — DIASTOLIC BLOOD PRESSURE: 75 MMHG | SYSTOLIC BLOOD PRESSURE: 123 MMHG

## 2018-03-07 VITALS — SYSTOLIC BLOOD PRESSURE: 116 MMHG | DIASTOLIC BLOOD PRESSURE: 74 MMHG

## 2018-03-07 VITALS — SYSTOLIC BLOOD PRESSURE: 107 MMHG | DIASTOLIC BLOOD PRESSURE: 72 MMHG

## 2018-03-07 RX ADMIN — LEUCINE, PHENYLALANINE, LYSINE, METHIONINE, ISOLEUCINE, VALINE, HISTIDINE, THREONINE, TRYPTOPHAN, ALANINE, GLYCINE, ARGININE, PROLINE, SERINE, TYROSINE, SODIUM ACETATE, DIBASIC POTASSIUM PHOSPHATE, MAGNESIUM CHLORIDE, SODIUM CHLORIDE, CALCIUM CHLORIDE, DEXTROSE SCH MLS/HR
311; 238; 247; 170; 255; 247; 204; 179; 77; 880; 438; 489; 289; 213; 17; 297; 261; 51; 77; 33; 5 INJECTION INTRAVENOUS at 04:25

## 2018-03-07 RX ADMIN — LEUCINE, PHENYLALANINE, LYSINE, METHIONINE, ISOLEUCINE, VALINE, HISTIDINE, THREONINE, TRYPTOPHAN, ALANINE, GLYCINE, ARGININE, PROLINE, SERINE, TYROSINE, SODIUM ACETATE, DIBASIC POTASSIUM PHOSPHATE, MAGNESIUM CHLORIDE, SODIUM CHLORIDE, CALCIUM CHLORIDE, DEXTROSE SCH MLS/HR
311; 238; 247; 170; 255; 247; 204; 179; 77; 880; 438; 489; 289; 213; 17; 297; 261; 51; 77; 33; 5 INJECTION INTRAVENOUS at 03:46

## 2018-03-07 RX ADMIN — METOCLOPRAMIDE SCH MG: 5 INJECTION, SOLUTION INTRAMUSCULAR; INTRAVENOUS at 03:39

## 2018-03-07 RX ADMIN — SUCRALFATE SCH GM: 1 TABLET ORAL at 09:44

## 2018-03-07 NOTE — DISCHARGE INST-COMPLEX
PDI


Med Rec & Follow Up Appt.


New Medications:  


Metoclopramide HCl (Metoclopramide HCl) 10 Mg Tablet


10 MG PO ACHS, #120 TAB





 


Changed Medications:  


Ondansetron (Ondansetron Odt) 4 Mg Tab.rapdis


4 MG PO QID PRN for NAUSEA/VOMITING-1ST LINE, #75 TAB (Changed from: TID)





Pantoprazole Sodium (Pantoprazole Sodium) 40 Mg Tablet.dr


40 MG PO BID, #60 TAB (Changed from: DAILY)





 


Continued Medications:  


Meclizine HCl (Meclizine HCl) 25 Mg Tablet


25 MG PO TID PRN for DIZZINESS, TAB





Promethazine HCl (Promethazine Tablet) 25 Mg Tablet


25 MG PO TID PRN for NAUSEA/VOMITING-2ND LINE, TAB





Sucralfate (Sucralfate) 1 Gm Tablet


1 GM PO ACHS, TAB





 


Discontinued Medications:  


Hydroxychloroquine Sulfate (Hydroxychloroquine Sulfate) 200 Mg Tablet


200 MG PO BID, TAB








Prescription:  Transmitted to Pharmacy


Patient Instructions:  


APPT WITH NUCLEAR MEDICINE FOR GASTRIC EMPTYING STUDY - SHOW UP AT


HOSPITAL AT 6:45am - STUDY TO BE DONE AT 7:15AM


STUDY APPROVED BY [facility] IN NUCLEAR MEDICINE





Activity, Diet and PDI


Resume Normal Activity:  Yes


Discharge Diet:  Low Residue


Diet for 24 Hours:  No Alcohol, No Greasy Foods, No Spicy Foods


Drink 6-8 Glasses of Fluid/Day:  Yes


Driving Instructions:  No Driving for 24 Hours


Symptoms to Reoprt to :  Fever Over 101 Degrees F


For Problems or Questions:  Contact Your Physician, Go to Emergency Room











ANNY BLACK MD Mar 7, 2018 09:41

## 2018-03-07 NOTE — DISCHARGE SUMMARY
Diagnosis/Chief Complaint


Date of Admission


Mar 5, 2018 at 15:58


Date of Discharge








Discharge Summary


Discharge Physical Examination


Allergies:  


Coded Allergies:  


     No Known Drug Allergies (Unverified , 3/5/18)


Vitals & I&Os





Vital Signs








  Date Time  Temp Pulse Resp B/P (MAP) Pulse Ox O2 Delivery O2 Flow Rate FiO2


 


3/7/18 07:39 98.0 89  116/74 (88) 99 Room Air  


 


3/7/18 04:02   17     











Discharge


Instructions to patient/family


Please see electronic discharge instructions given to patient.


Discharge Medications


Reviewed and agree with Discharge Medication list on patient's Discharge 

Instruction sheet





Clinical Quality Measures


DVT/VTE Risk/Contraindication:


RFS Level Per Nursing on Admit:  1=Low/No VTE PPX











ANNY BLAKC MD Mar 7, 2018 09:28

## 2018-03-07 NOTE — PROGRESS NOTE
Subjective


Date Seen by Provider:  Mar 7, 2018


Time Seen by Provider:  08:24


Subjective/Events-last exam


Patient doing a little bit better this morning.  Still having some nausea but 

coming in waves.  No significant abdominal discomfort.  No liquid stools.


Patient with no fever sweats chills shortness of breath or chest pain.





Objective


Exam





Vital Signs








  Date Time  Temp Pulse Resp B/P (MAP) Pulse Ox O2 Delivery O2 Flow Rate FiO2


 


3/7/18 12:30  65 16 130/84 99 Room Air  


 


3/7/18 11:41 97.2 65 16 130/84 (99) 99 Room Air  


 


3/7/18 07:39 98.0 89  116/74 (88) 99 Room Air  


 


3/7/18 04:02 97.6 57 17 123/75 (91) 99 Room Air  


 


3/7/18 00:00 97.7 55 18 107/72 (84) 98 Room Air  


 


3/6/18 19:59 96.7 56 15 117/78 (91) 100 Room Air  


 


3/6/18 16:45 97.0 58 15 129/81 (97) 100 Room Air  














I & O 


 


 3/7/18





 07:00


 


Intake Total 1520 ml


 


Balance 1520 ml





Capillary Refill :


General Appearance:  No Apparent Distress


HEENT:  PERRL/EOMI


Neck:  Normal Inspection, Non Tender


Respiratory:  No Accessory Muscle Use, No Respiratory Distress


Cardiovascular:  Regular Rate, Rhythm


Gastrointestinal:  soft, tenderness (slight in the epigastric region)


Extremity:  Normal Inspection, Non Tender


Neurologic/Psychiatric:  Alert, Oriented x3, No Motor/Sensory Deficits, Normal 

Mood/Affect, CNs II-XII Norm as Tested


Skin:  Warm/Dry


Lymphatic:  No Adenopathy





Assessment/Plan


ABDOMINAL PAIN


WEIGHT LOSS


NAUSEA


EMESIS


FATIGUE


DIARRHEA


Duodenitis, gastritis, slight  esophagitis





hida normal, may have a component of gastroparesis.  A gastric emptying study 

has been ordered.  We'll continue on Protonix and Carafate.  I think patient 

can be discharged home with close outpatient follow-up.  Would also consider 

stopping Plaquenil, which could be cause of symptoms as well.  No surgical 

intervention.





Clinical Quality Measures


DVT/VTE Risk/Contraindication:


RFS Level Per Nursing on Admit:  1=Low/No VTE PPX











DARRELL MENDES DO Mar 7, 2018 14:01

## 2018-03-08 ENCOUNTER — HOSPITAL ENCOUNTER (OUTPATIENT)
Dept: HOSPITAL 75 - CARD | Age: 38
End: 2018-03-08
Attending: FAMILY MEDICINE
Payer: COMMERCIAL

## 2018-03-08 DIAGNOSIS — R63.4: Primary | ICD-10-CM

## 2018-03-08 DIAGNOSIS — R10.9: ICD-10-CM

## 2018-03-08 DIAGNOSIS — R11.2: ICD-10-CM

## 2018-03-08 PROCEDURE — 78264 GASTRIC EMPTYING IMG STUDY: CPT

## 2018-03-08 NOTE — DIAGNOSTIC IMAGING REPORT
INDICATION:   Abnormal weight loss



The patient was administered a solid test meal with 1.0 mCi Tc

99M sulfur colloid used for labeling of the meal.  Region of

interest curves were drawn over the stomach with the percent of

retained activity calculated at hourly timed intervals for a

total of 4 hours.  A time activity curve was generated.



FINDINGS:

Percent retention as follows: (percent of administered activity

retained within the stomach):



1.0 hour:  64%                           <30% = Rapid,  >90% =

Delayed

2.0 hour:  28%                           >60% = Abnormally

Delayed

3.0 hour:  7%                           >30% = Abnormally Delayed

4.0 hour:  1%                           >10% = Abnormally Delayed



IMPRESSION:

Normal gastric emptying at all timed intervals.               



Dictated by: 



  Dictated on workstation # [id number]

## 2018-12-07 ENCOUNTER — HOSPITAL ENCOUNTER (EMERGENCY)
Dept: HOSPITAL 75 - ER | Age: 38
Discharge: HOME | End: 2018-12-07
Payer: COMMERCIAL

## 2018-12-07 VITALS — HEIGHT: 73 IN | WEIGHT: 210 LBS | BODY MASS INDEX: 27.83 KG/M2

## 2018-12-07 VITALS — DIASTOLIC BLOOD PRESSURE: 97 MMHG | SYSTOLIC BLOOD PRESSURE: 137 MMHG

## 2018-12-07 DIAGNOSIS — M06.9: Primary | ICD-10-CM

## 2018-12-07 DIAGNOSIS — Z87.19: ICD-10-CM

## 2018-12-07 DIAGNOSIS — F32.9: ICD-10-CM

## 2018-12-07 DIAGNOSIS — Z87.891: ICD-10-CM

## 2018-12-07 DIAGNOSIS — M25.511: ICD-10-CM

## 2018-12-07 DIAGNOSIS — F12.10: ICD-10-CM

## 2018-12-07 DIAGNOSIS — K21.9: ICD-10-CM

## 2018-12-07 PROCEDURE — 96372 THER/PROPH/DIAG INJ SC/IM: CPT

## 2018-12-07 PROCEDURE — 99284 EMERGENCY DEPT VISIT MOD MDM: CPT

## 2018-12-07 NOTE — XMS REPORT
Clinical Summary

 Created on: 2018



Jose Jones

External Reference #: [id number]

: 1980

Sex: Male



Demographics







 Address  [address] [place]  [address]

 

 Home Phone  [phone]

 

 Preferred Language  English

 

 Marital Status  Unknown

 

 Hoah[employer]ism Affiliation  Unknown

 

 Race  Unknown

 

 Ethnic Group  Unknown





Author







 Author  [facility]

 

 Organization  [facility]

 

 Address  Unknown

 

 Phone  Unavailable







Support







 Name  Relationship  Address  Phone

 

 Contact,No  EC[employer]  Unknown  [phone]







Care Team Providers







 Care Team Member Name  Role  Phone

 

  PCP  Unavailable







Allergies

Not on File



Current Medications

Not on file



Active Problems





Not on file



Social History







    



  Tobacco Use   Types   Packs/Day   Years Used   Date

 

    



  Never Assessed    









 



  Sex Assigned at Birth   Date Recorded

 

 



  Not on file 







Last Filed Vital Signs

Not on file



Plan of Treatment







   



  Health Maintenance   Due Date   Last Done   Comments

 

   



  Td #   1980  

 

   



  Influenza Vaccine (#1)   2018  







Results

Not on filefrom Last 3 Months

## 2018-12-07 NOTE — XMS REPORT
CCD document using C-CDA

 Created on: 2018



Jose Jones

External Reference #: [id number]

: 1980

Sex: Male



Demographics







 Address  [address]  [address]

 

 Home Phone  [phone]

 

 Preferred Language  English

 

 Marital Status  

 

 Taoism Affiliation  Unknown

 

 Race  Other Race

 

 Ethnic Group  Not  or 





Author







 Author  Meghan Landrum

 

 Organization  Meghan Landrum MD, [facility]

 

 Address  [address] [place]  [address]



 

 Phone  [phone]







Care Team Providers







 Care Team Member Name  Role  Phone

 

  PP  Unavailable

 

  CCM  Unavailable



                                            



Summary Purpose

          Interface Exchange                                                   
                 



Insurance Providers

                      





 Payer name                    Policy type / Coverage type                    
Covered party ID                    Effective Begin Date                    
Effective End Date                

 

 [facility] [employer]    
                [id number]                    [id number]                    
Unknown                



                                                                              



Family history

                      



Mother            





 Diagnosis                    Age At Onset                

 

 No Family Disease Entered                    N/A                



            



Sister            





 Diagnosis                    Age At Onset                

 

 No Family Disease Entered                    N/A                



            



Brother            





 Diagnosis                    Age At Onset                

 

 No Family Disease Entered                    N/A                



            



Daughter            





 Diagnosis                    Age At Onset                

 

 No Family Disease Entered                    N/A                



            



Brother            





 Diagnosis                    Age At Onset                

 

 No Family Disease Entered                    N/A                



            



Brother            





 Diagnosis                    Age At Onset                

 

 No Family Disease Entered                    N/A                



            



Brother            





 Diagnosis                    Age At Onset                

 

 No Family Disease Entered                    N/A                



            



Father            





 Diagnosis                    Age At Onset                

 

 No Family Disease Entered                    N/A                



                                                                               
                                                                     



Social History

                      





 Social History Element                    Codes                    Description
                    Effective Dates                

 

 Employment                    Unknown                    Currently employed

[employer]                    03/15/2018                

 

 Number of children                    Unknown                    1            
        2013                

 

 Marital status                    Unknown                              
          2012                

 

 Living arrangements                    Unknown                    House       
             2012                



                                                                               
                                       



Allergies, Adverse Reactions, Alerts

                      





 Substance                    Reaction                    Codes                
    Entered Date                    Inactivated Date                    Status 
               

 

                     * NO KNOWN ENVIRONMENTAL ALLERGIES                        
                                   Unknown                    2012       
             No Inactive Date                    Active                

 

                     * NO KNOWN FOOD ALLERGIES                                 
                          Unknown                    2012                
    No Inactive Date                    Active                

 

                     * NO KNOWN DRUG ALLERGIES                                 
                          Unknown                    2012                
    No Inactive Date                    Active                



                                                                               
                   



Past Medical History

                      





 Illness                    Codes                    Condition Status          
          Onset Date                    Resolved Date                

 

                     Other allergic rhinitis                                   
   ICD-9: 477.8

ICD-10: J30.89                    Active                    05/15/2017         
           Unknown                

 

                     Gastro-esophageal reflux disease without esophagitis      
                                ICD-9: 530.81

ICD-10: K21.9                    Active                    2018          
          Unknown                

 

                     Nausea                                      ICD-9: 787.02

ICD-10: R11.0                    Active                    2018          
          Unknown                

 

                     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 
without organ or systems involvement                                      ICD-9
: 714.0

ICD-10: M05.732                    Active                    2017        
            Unknown                

 

                     Abnormal weight loss                                      
ICD-9: 783.21

ICD-10: R63.4                    Active                    2018          
          Unknown                

 

                     Other malaise                                      ICD-9: 
780.79

ICD-10: R53.81                    Active                    2018         
           Unknown                

 

                     Benign paroxysmal vertigo, bilateral                      
                ICD-9: 386.11

ICD-10: H81.13                    Active                    2017         
           Unknown                

 

                     Other fatigue                                      ICD-9: 
780.79

ICD-10: R53.83                    Active                    2018         
           Unknown                

 

                     Acute bronchitis due to other specified organisms         
                             ICD-9: 466.0

ICD-10: J20.8                    Active                    05/15/2017          
          Unknown                

 

                     Pain in right wrist                                      
ICD-9: 719.43

ICD-10: M25.531                    Active                    2017        
            Unknown                

 

                     Encounter for general adult medical examination without 
abnormal findings                                      ICD-9: V70.0

ICD-10: Z00.00                    Active                    2015         
           Unknown                

 

                     PREVENTIVE PHYSICAL EXAM                                  
    ICD-9: V70.0                    Active                    2015       
             Unknown                

 

                     ACHILLES TENDINITIS                                      
ICD-9: 726.71                    Active                    2013          
          Unknown                

 

                     ACUTE URI                                      ICD-9: 
465.9                    Active                    2012                  
  Unknown                

 

                     Elevated blood pressure                                   
   ICD-9: 796.2                    Active                    2012        
            Unknown                

 

                     Localized pruritus                                      ICD
-9: 698.9                    Active                    2012              
      Unknown                

 

                     Rash                                      ICD-9: 782.1    
                Active                    2012                    Unknown
                



                                                                               
                                                                               
                                                                               
           



Problems

                      





 Condition                    Codes                    Effective Dates         
           Condition Status                

 

                     Other allergic rhinitis                                   
   ICD-9: 477.8

ICD-10: J30.89                    05/15/2017                    Active         
       

 

                     Gastro-esophageal reflux disease without esophagitis      
                                ICD-9: 530.81

ICD-10: K21.9                    2018                    Active          
      

 

                     Nausea                                      ICD-9: 787.02

ICD-10: R11.0                    2018                    Active          
      

 

                     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 
without organ or systems involvement                                      ICD-9
: 714.0

ICD-10: M05.732                    2017                    Active        
        

 

                     Abnormal weight loss                                      
ICD-9: 783.21

ICD-10: R63.4                    2018                    Active          
      

 

                     Other malaise                                      ICD-9: 
780.79

ICD-10: R53.81                    2018                    Active         
       

 

                     Benign paroxysmal vertigo, bilateral                      
                ICD-9: 386.11

ICD-10: H81.13                    2017                    Active         
       

 

                     Other fatigue                                      ICD-9: 
780.79

ICD-10: R53.83                    2018                    Active         
       

 

                     Acute bronchitis due to other specified organisms         
                             ICD-9: 466.0

ICD-10: J20.8                    05/15/2017                    Active          
      

 

                     Pain in right wrist                                      
ICD-9: 719.43

ICD-10: M25.531                    2017                    Active        
        

 

                     Encounter for general adult medical examination without 
abnormal findings                                      ICD-9: V70.0

ICD-10: Z00.00                    2015                    Active         
       

 

                     PREVENTIVE PHYSICAL EXAM                                  
    ICD-9: V70.0                    2015                    Active       
         

 

                     ACHILLES TENDINITIS                                      
ICD-9: 726.71                    2013                    Active          
      

 

                     ACUTE URI                                      ICD-9: 
465.9                    2012                    Active                

 

                     Elevated blood pressure                                   
   ICD-9: 796.2                    2012                    Active        
        

 

                     Localized pruritus                                      ICD
-9: 698.9                    2012                    Active              
  

 

                     Rash                                      ICD-9: 782.1    
                2012                    Active                



                                                                               
                                                                               
                                                                               
           



Medications

                      





 Medication                    Codes                    Instructions           
         Start Date                    Stop Date                    Status     
               Fill Instructions                

 

                     Kenalog 40 mg/mL suspension for injection                 
                     RxNorm: 9736854                    Milliliter(s) Inj      
              2018                    
Inactive                                    

 

                     clobetasol 0.05 % topical cream                           
           RxNorm: 532635                    APPLY TO AFFECTED AREAS 1 
Application TOP BID as needed FOR ECZEMA                    10/04/2018         
           2019                    Active                    dispense 
large tube please                

 

                     clobetasol 0.05 % topical cream                           
           RxNorm: 030233                    APPLY TO AFFECTED AREAS TOP AS 
NEEDED FOR ECZEMA                    2018                    No Stop Date
                    Active                                    

 

                     clobetasol 0.05 % topical cream                           
           RxNorm: 485174                    APPLY TO AFFECTED AREAS 1 
Application TOP BID as needed FOR ECZEMA                    2018         
           10/03/2018                    Inactive                    dispense 
large tube please                

 

                     promethazine 25 mg tablet                                 
     RxNorm: 997312                    1 Tablet(s) PO TID as needed nausea     
               2018                    
Inactive                                    

 

                     promethazine 25 mg tablet                                 
     RxNorm: 045272                    1 Tablet(s) PO TID as needed nausea     
               2018                    
Inactive                                    

 

                     Zofran ODT 4 mg disintegrating tablet                     
                 RxNorm: 186154                    1 Tablet(s) PO TID as needed
                    2018                    
Inactive                                    

 

                     Carafate 1 gram tablet                                    
  RxNorm: 412399                    1 Tablet(s) PO AC & HS                    2018                    Inactive             
                       

 

                     Carafate 1 gram tablet                                    
  RxNorm: 372595                    1 Tablet(s) PO AC & HS                    2018                    Inactive             
                       

 

                     Prilosec OTC 20 mg tablet,delayed release                 
                     RxNorm: 504664                    1 Tablet(s) PO daily    
                2018                    
Inactive                                    

 

                     meclizine 25 mg tablet                                    
  RxNorm: 848179                    1 Tablet(s) PO TID as needed               
     2018                    Inactive      
                              

 

                     Zofran ODT 4 mg disintegrating tablet                     
                 RxNorm: 009330                    1 Tablet(s) PO TID as needed
                    2018                    
Inactive                                    

 

                     clobetasol 0.05 % topical cream                           
           RxNorm: 999491                    APPLY TO AFFECTED AREAS TOP as 
needed FOR ECZEMA                    2017  
                  Inactive                                    

 

                     Voltaren 1 % topical gel                                  
    RxNorm: 023813                    1 Application TOP QID as needed          
          2017                    No Stop Date                    Active 
                                   

 

                     prednisone 10 mg tablet                                   
   RxNorm: 424736                    Tablet(s) PO UD                    2018                    Inactive                 
   6,5,4,3,2,1                

 

                     Kenalog 40 mg/mL suspension for injection                 
                     RxNorm: 9973774                    Milliliter(s) Inj      
              2017                    
Inactive                                    

 

                     Zyrtec 10 mg tablet                                      
RxNorm: 7610212                    1 Tablet(s) PO daily                    05/15
/2017                    2017                    Inactive                
                    

 

                     Kenalog 40 mg/mL suspension for injection                 
                     RxNorm: 1708205                    1 Milliliter(s) Inj    
                05/15/2017                    05/15/2017                    
Inactive                                    

 

                     prednisone 20 mg tablet                                   
   RxNorm: 860387                    1 Tablet(s) PO daily                    05/
15/2017                    2018                    Inactive              
                      

 

                     clobetasol 0.05 % topical cream                           
           RxNorm: 868710                    APPLY TO AFFECTED AREAS TOP as 
needed FOR ECZEMA                    2016  
                  Inactive                                    

 

                     prednisone 10 mg tablets in a dose pack                   
                   RxNorm: 895472                    Tablet(s) PO              
      2013                    Inactive     
                               

 

                     Diflucan 150 mg tablet                                    
  RxNorm: 672648                    1 Tablet(s) PO daily                    2013                    Inactive              
                      

 

                     nystatin-triamcinolone 100,000 unit/gram-0.1 % Ointment   
                                   RxNorm: 6551091                    1 
Application TOP BID                    2012
                    Inactive                                    

 

                     nystatin-triamcinolone 100,000 unit/gram-0.1 % Ointment   
                                   RxNorm: 5642934                    1 
Application TOP BID                    2012
                    Inactive                                    

 

                     Bactrim  mg-160 mg tablet                           
           RxNorm: 801675                    1 Tablet(s) PO BID                
    2012                    Inactive       
                             

 

                     prednisone 10 mg tablets in a dose pack                   
                   RxNorm: 329567                    Tablet(s) PO UD 6-5-4-3-2-
1                    2012                  
  Inactive                                    

 

                     itraconazole 100 mg Cap                                   
   RxNorm: 308873                    1 Capsule(s) PO                    2013                    Inactive                 
                   

 

                     Kenalog 40 mg/mL Susp for Injection                       
               RxNorm: 2187540                    Milliliter(s) Inj            
        2012                    Inactive   
                                 

 

                     Kenalog 40 mg/mL Susp for Injection                       
               RxNorm: 0750499                    2 Milliliter(s) Inj          
          2012                    Inactive 
                                   

 

                     itraconazole 100 mg Cap                                   
   RxNorm: 965418                    1 Capsule(s) PO                    No 
Start Date                    2012                    Inactive           
                         

 

                     clobetasol 0.05 % topical cream                           
           RxNorm: 244568                    APPLY TO AFFECTED AREAS TOP as 
needed FOR ECZEMA                    No Start Date                    02/10/
2016                    Inactive                                    

 

                     meloxicam 15 mg tablet                                    
  RxNorm: 448045                    1 Tablet(s) PO daily                    No 
Start Date                    2018                    Inactive           
                         



                                                                               
                                                                               
                                                                               
                                                                               
                                                                        



Medication Administered

                      





 Medication                    Codes                    Instructions           
         Start Date                    Status                

 

 Kenalog 40 mg/mL suspension for injection                    RxNorm: 2073964  
                  Milliliter                    2018                    
Active                

 

 Kenalog 40 mg/mL suspension for injection                    RxNorm: 3920795  
                  Milliliter                    2017                    
No longer Active                

 

 Kenalog 40 mg/mL suspension for injection                    RxNorm: 7893357  
                  1Milliliter                    05/15/2017                    
No longer Active                

 

 Kenalog 40 mg/mL Susp for Injection                    RxNorm: 4268401        
            2Milliliter                    2012                    No 
longer Active                



                                                                               
                             



Immunizations

          No Immunization data                                                 
         



Assessments

                      





 Condition                    Codes                    Effective Dates         
       

 

 Other allergic rhinitis                    ICD-10: J30.89

ICD-9: 477.8                    2018                

 

 Nausea                    ICD-10: R11.0

ICD-9: 787.02                    03/15/2018                

 

 Gastro-esophageal reflux disease without esophagitis                    ICD-10
: K21.9

ICD-9: 530.81                    03/15/2018                

 

 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 
systems involvement                    ICD-10: M05.732

ICD-9: 714.0                    03/15/2018                

 

 Abnormal weight loss                    ICD-10: R63.4

ICD-9: 783.21                    2018                

 

 Other malaise                    ICD-10: R53.81

ICD-9: 780.79                    2018                

 

 Other fatigue                    ICD-10: R53.83

ICD-9: 780.79                    2018                

 

 Benign paroxysmal vertigo, bilateral                    ICD-10: H81.13

ICD-9: 386.11                    2018                

 

 Acute bronchitis due to other specified organisms                    ICD-10: 
J20.8

ICD-9: 466.0                    05/15/2017                

 

 Pain in right wrist                    ICD-10: M25.531

ICD-9: 719.43                    2017                

 

 Encounter for general adult medical examination without abnormal findings     
               ICD-10: Z00.00

ICD-9: V70.0                    06/15/2016                

 

 PREVENTIVE PHYSICAL EXAM                    ICD-9: V70.0                                    

 

 ACHILLES TENDINITIS                    ICD-9: 726.71                    2013                

 

 PRURITIC DISORDER                    ICD-9: 698.9                    2013                

 

 Rash                    ICD-9: 782.1                    2013            
    

 

 ACUTE URI                    ICD-9: 465.9                    2012       
         

 

 Elevated blood pressure                    ICD-9: 796.2                                    



                                                                    



Reason For Visit

                      





 Reason For Visit                    Effective Dates                    Notes  
              

 

 Hospital Follow Up                    03/15/2018                              
      

 

 nausea                    2018                                    

 

 nausea                    2018                                    

 

 dizziness                    2018                                    

 

 wrist pain                    2017                                    

 

 cough                    05/15/2017                                    

 

 dizziness                    2017                                    

 

 wrist pain                    2017                                    

 

 ankle pain                    06/15/2016                                    

 

 skin lesion                    2015                                    

 

 ankle pain                    2013                                    

 

 skin lesion                    2013                                    

 

 headache                    2012                                    

 

 rash                    2012                    started on patient's 
right hand and spread to the left.  he was taking diflucan and nystatin cream.  
Once that cleared, it started on both of his arms.                  

 

 flare up of rash                    2012                                
    



                                                                              



Results

                      





 Observation                    Observation Code                    Item       
             Item Code                    Result                    Date       
         

 

 Jordyn                    [id number]                    JORDYN (FLAVIO) SCREEN           
                             DETECTED                     2017           
     

 

 Jordyn                    [id number]                    JORDYN (IFA) TITER              
                          1:160                     2017                

 

 Jordyn                    [id number]                    JORDYN PATTERN                  
                      SPECKLED                     2017                

 

 Jordyn                    [id number]                                                 
                               2017                

 

 Cbc With Differential                    Ord2                    WBC          
                              3.84 K/ul                    2017          
      

 

 Cbc With Differential                    Ord2                    RBC          
                              5.27 M/ul                    2017          
      

 

 Cbc With Differential                    Ord2                    HGB          
                              15.6 g/dl                    2017          
      

 

 Cbc With Differential                    Ord2                    Neut%        
                                56.7 %                    2017           
     

 

 Cbc With Differential                    Ord2                    HCT          
                              45.1 %                    2017             
   

 

 Cbc With Differential                    Ord2                    MCV          
                              85.6 fl                    2017            
    

 

 Cbc With Differential                    Ord2                    Lymph%       
                                 31.0 %                    2017          
      

 

 Cbc With Differential                    Ord2                    MCH          
                              29.6 pg                    2017            
    

 

 Cbc With Differential                    Ord2                    Mono%        
                                10.4 %                    2017           
     

 

 Cbc With Differential                    Ord2                    MCHC         
                               34.6 pg                    2017           
     

 

 Cbc With Differential                    Ord2                    Eos%         
                               1.6 %                    2017             
   

 

 Cbc With Differential                    Ord2                    Baso%        
                                0.3 %                    2017            
    

 

 Cbc With Differential                    Ord2                    PLT          
                              218 K/ul                    2017           
     

 

 Cbc With Differential                    Ord2                    RDW          
                              14.9 %                    2017             
   

 

 Cbc With Differential                    Ord2                    Neut ABS#    
                                    2.18 K/ul                    2017    
            

 

 Cbc With Differential                    Ord2                    Lymph ABS#   
                                     1.19 K/ul                    2017   
             

 

 Cbc With Differential                    Ord2                    Mono ABS#    
                                    0.4 K/ul                    2017     
           

 

 Cbc With Differential                    Ord2                    Eos ABS#     
                                   0.1 K/ul                    2017      
          

 

 Cbc With Differential                    Ord2                    Baso ABS#    
                                    0.0 K/ul                    2017     
           

 

 C-Reactive Protein  Qnt                    Crqnt                    CRP       
                                 0.7 mg/dl                    2017       
         

 

 Sed Rate                    Ord21                    ESR                      
                  4 mm/hr                    2017                

 

 Ra Factor                    Pna243                    RA FACTOR              
                          21.4 IU/ml                    2017             
   

 

 Uric Acid                    Ord77                    Uric A                  
                      6.2 mg/dL                    2017                

 

 Lipid                    Ord30                    CHOL                        
                193 mg/dL                    2016                

 

 Lipid                    Ord30                    HDL                         
               38.0 mg/dl                    2016                

 

 Lipid                    Ord30                    TRIG                        
                187 mg/dL                    2016                

 

 Lipid                    Ord30                    LDL                         
               118 mg/dL                    2016                

 

 Lipid                    Ord30                    C/HDL                       
                 5.1 Ratio                    2016                

 

 Tsh                    Ord6                    hTSH II                        
                1.78 uIU/mL                    2016                

 

 Comp Metabolic                    Iqi918                    NA                
                        135 mEq/L                    2016                

 

 Comp Metabolic                    Ral095                    K                 
                       4.3 mEq/L                    2016                

 

 Comp Metabolic                    Ooq899                    CL                
                        102 mEq/L                    2016                

 

 Comp Metabolic                    Hji646                    CO2               
                         30.0 mEq/L                    2016              
  

 

 Comp Metabolic                    Ycz190                    ANION GAP         
                               7                     2016                

 

 Comp Metabolic                    Jaw836                    GLUCOSE           
                             105 mg/dL                    2016           
     

 

 Comp Metabolic                    Zjf590                    Creat             
                           0.9 mg/dL                    2016             
   

 

 Comp Metabolic                    Tzk226                    eGFR              
                          102 ml/min/1.73m2                    2016      
          

 

 Comp Metabolic                    Knq702                    BUN               
                         13 mg/dL                    2016                

 

 Comp Metabolic                    Xxj325                    B/C Ratio         
                               14.4 Ratio                    2016        
        

 

 Comp Metabolic                    Cnx437                    CALCIUM           
                             9.1 mg/dL                    2016           
     

 

 Comp Metabolic                    Ezu599                    ALK PHOS          
                              53 U/L                    2016             
   

 

 Comp Metabolic                    Sld301                    AST(SGOT)         
                               18 U/L                    2016            
    

 

 Comp Metabolic                    Oik015                    ALT(SGPT)         
                               27 U/L                    2016            
    

 

 Comp Metabolic                    Fkg138                    BILI T            
                            0.5 mg/dL                    2016            
    

 

 Comp Metabolic                    Lyx651                    ALBUMIN           
                             4.4 g/dL                    2016            
    

 

 Comp Metabolic                    Pvr052                    TPRO              
                          6.9 g/dL                    2016                

 

 Comp Metabolic                    Qpl342                    GLOB              
                          2.5 g/dL                    2016                

 

 Comp Metabolic                    Kwg077                    A/G Ratio         
                               1.7 Ratio                    2016         
       

 

 Comp Metabolic                    Zuc979                    Osmo              
                          271 mOsmo                    2016              
  

 

 Cbc With Differential                    Ord2                    WBC          
                              4.48 K/ul                    2016          
      

 

 Cbc With Differential                    Ord2                    RBC          
                              5.41 M/ul                    2016          
      

 

 Cbc With Differential                    Ord2                    HGB          
                              15.7 g/dl                    2016          
      

 

 Cbc With Differential                    Ord2                    HCT          
                              46.7 %                    2016             
   

 

 Cbc With Differential                    Ord2                    Neut%        
                                48.9 %                    2016           
     

 

 Cbc With Differential                    Ord2                    Lymph%       
                                 36.6 %                    2016          
      

 

 Cbc With Differential                    Ord2                    MCV          
                              86.3 fl                    2016            
    

 

 Cbc With Differential                    Ord2                    Mono%        
                                11.4 %                    2016           
     

 

 Cbc With Differential                    Ord2                    MCH          
                              29.0 pg                    2016            
    

 

 Cbc With Differential                    Ord2                    MCHC         
                               33.6 pg                    2016           
     

 

 Cbc With Differential                    Ord2                    Eos%         
                               2.9 %                    2016             
   

 

 Cbc With Differential                    Ord2                    PLT          
                              215 K/ul                    2016           
     

 

 Cbc With Differential                    Ord2                    Baso%        
                                0.2 %                    2016            
    

 

 Cbc With Differential                    Ord2                    RDW          
                              15.0 %                    2016             
   

 

 Cbc With Differential                    Ord2                    Neut ABS#    
                                    2.19 K/ul                    2016    
            

 

 Cbc With Differential                    Ord2                    Lymph ABS#   
                                     1.64 K/ul                    2016   
             

 

 Cbc With Differential                    Ord2                    Mono ABS#    
                                    0.5 K/ul                    2016     
           

 

 Cbc With Differential                    Ord2                    Eos ABS#     
                                   0.1 K/ul                    2016      
          

 

 Cbc With Differential                    Ord2                    Baso ABS#    
                                    0.0 K/ul                    2016     
           

 

 Cbc With Differential                    Ord2                    New Analyzer 
Notice                                        Please note new ref ranges 
starting 2016 due to implemntation of new five part differential hematolgy 
analyzer.                     2016                



                                                                               
                                                                               
          



Review of Systems

                      





 System                    Result                    Effective Dates           
     

 

 Constitutional                    No recent illness                    03/15/
2018                

 

 Constitutional                    No night sweats                    03/15/
2018                

 

 Constitutional                    No chills                    03/15/2018     
           

 

 Constitutional                    No fatigue                    03/15/2018    
            

 

 Constitutional                    No fever                    03/15/2018      
          

 

 Constitutional                    No insomnia                    03/15/2018   
             

 

 Constitutional                    No malaise                    03/15/2018    
            

 

 Eyes                    No blindness                    03/15/2018            
    

 

 Eyes                    No vision change                    03/15/2018        
        

 

 Ears/Nose/Throat/Neck                    No dental pain                    03/
15/2018                

 

 Ears/Nose/Throat/Neck                    No dizziness                    03/15/
2018                

 

 Ears/Nose/Throat/Neck                    No dysphagia                    03/15/
2018                

 

 Ears/Nose/Throat/Neck                    No headache                    03/15/
2018                

 

 Ears/Nose/Throat/Neck                    No hearing loss                    03/
15/2018                

 

 Ears/Nose/Throat/Neck                    No nasal allergies                    
03/15/2018                

 

 Ears/Nose/Throat/Neck                    No sore throat                    03/
15/2018                

 

 Ears/Nose/Throat/Neck                    No postnasal drip                    
03/15/2018                

 

 Ears/Nose/Throat/Neck                    No sinus congestion                  
  03/15/2018                

 

 Cardiovascular                    No chest pain/pressure                    03/
15/2018                

 

 Cardiovascular                    No dyspnea                    03/15/2018    
            

 

 Cardiovascular                    No edema                    03/15/2018      
          

 

 Cardiovascular                    No exercise intolerance                    03
/15/2018                

 

 Cardiovascular                    No fatigue                    03/15/2018    
            

 

 Cardiovascular                    No hypertension                    03/15/
2018                

 

 Cardiovascular                    No near-syncope/dizziness                    
03/15/2018                

 

 Cardiovascular                    No palpitations                    03/15/
2018                

 

 Respiratory                    No chest tightness                    03/15/
2018                

 

 Respiratory                    No cigarette smoking                    03/15/
2018                

 

 Respiratory                    No cough                    03/15/2018         
       

 

 Respiratory                    No dyspnea on exertion                    03/15/
2018                

 

 Respiratory                    No dyspnea                    03/15/2018       
         

 

 Respiratory                    No pedal edema                    03/15/2018   
             

 

 Gastrointestinal                    No abdominal pain                    03/15/
2018                

 

 Gastrointestinal                    No constipation                    03/15/
2018                

 

 Gastrointestinal                    No diarrhea                    03/15/2018 
               

 

 Gastrointestinal                    No gastroesophageal reflux                
    03/15/2018                

 

 Gastrointestinal                    No hematochezia                    03/15/
2018                

 

 Gastrointestinal                    No nausea                    03/15/2018   
             

 

 Gastrointestinal                    No vomiting                    03/15/2018 
               

 

 Genitourinary/Nephrology                    No dysuria                    03/15
/2018                

 

 Genitourinary/Nephrology                    No impotence                    03/
15/2018                

 

 Genitourinary/Nephrology                    No nocturia                    03/
15/2018                

 

 Genitourinary/Nephrology                    No urinary urgency                
    03/15/2018                

 

 Genitourinary/Nephrology                    No urinary frequency              
      03/15/2018                

 

 Genitourinary/Nephrology                    No urinary incontinence           
         03/15/2018                

 

 Musculoskeletal                    No stiffness                    03/15/2018 
               

 

 Musculoskeletal                    No swelling                    03/15/2018  
              

 

 Musculoskeletal                    No arthralgia(s)                    03/15/
2018                

 

 Musculoskeletal                    No joint complaint                    03/15/
2018                

 

 Musculoskeletal                    No muscle weakness                    03/15/
2018                

 

 Musculoskeletal                    No myalgias                    03/15/2018  
              

 

 Dermatologic                    No rash                    03/15/2018         
       

 

 Dermatologic                    No sores                    03/15/2018        
        

 

 Dermatologic                    No scar                    03/15/2018         
       

 

 Neurologic                    No ataxia                    03/15/2018         
       

 

 Neurologic                    No dizziness                    03/15/2018      
          

 

 Neurologic                    No dyskinesia or tremor                    03/15/
2018                

 

 Neurologic                    No gait abnormality                    03/15/
2018                

 

 Neurologic                    No headache                    03/15/2018       
         

 

 Neurologic                    No neck pain                    03/15/2018      
          

 

 Neurologic                    No syncope                    03/15/2018        
        

 

 Psychiatric                    No anxiety                    03/15/2018       
         

 

 Psychiatric                    No depression                    03/15/2018    
            

 

 Constitutional                    recent illness                    2018
                

 

 Constitutional                    anorexia                    2018      
          

 

 Constitutional                    No chills                    2018     
           

 

 Constitutional                    No diaphoresis                    2018
                

 

 Constitutional                    fatigue                    2018       
         

 

 Constitutional                    malaise                    2018       
         

 

 Constitutional                    weight loss                    2018   
             

 

 Eyes                    No blindness                    2018            
    

 

 Ears/Nose/Throat/Neck                    No dizziness                    2018                

 

 Ears/Nose/Throat/Neck                    No nasal allergies                    
2018                

 

 Ears/Nose/Throat/Neck                    No nasal discharge                    
2018                

 

 Cardiovascular                    No chest pain/pressure                    2018                

 

 Cardiovascular                    No dyspnea                    2018    
            

 

 Respiratory                    No chest congestion                    2018                

 

 Respiratory                    No cough                    2018         
       

 

 Gastrointestinal                    No abdominal pain                    2018                

 

 Gastrointestinal                    anorexia                    2018    
            

 

 Gastrointestinal                    No constipation                    2018                

 

 Gastrointestinal                    No diarrhea                    2018 
               

 

 Gastrointestinal                    No gastroesophageal reflux                
    2018                

 

 Gastrointestinal                    No hematochezia                    2018                

 

 Gastrointestinal                    No melena                    2018   
             

 

 Gastrointestinal                    nausea                    2018      
          

 

 Gastrointestinal                    vomiting                    2018    
            

 

 Musculoskeletal                    arthralgia(s)                    2018
                

 

 Dermatologic                    No rash                    2018         
       

 

 Neurologic                    No alteration of consciousness                  
  2018                

 

 Neurologic                    No mental status change                    2018                

 

 Constitutional                    No recent illness                    2018                

 

 Constitutional                    anorexia                    2018      
          

 

 Constitutional                    No chills                    2018     
           

 

 Constitutional                    No diaphoresis                    2018
                

 

 Constitutional                    fatigue                    2018       
         

 

 Constitutional                    malaise                    2018       
         

 

 Constitutional                    weight loss                    2018   
             

 

 Eyes                    No eye erythema                    2018         
       

 

 Ears/Nose/Throat/Neck                    No nasal discharge                    
2018                

 

 Ears/Nose/Throat/Neck                    No nasal allergies                    
2018                

 

 Ears/Nose/Throat/Neck                    No dizziness                    2018                

 

 Cardiovascular                    No chest pain/pressure                    2018                

 

 Cardiovascular                    No dyspnea                    2018    
            

 

 Respiratory                    No cough                    2018         
       

 

 Respiratory                    No chest congestion                    2018                

 

 Gastrointestinal                    anorexia                    2018    
            

 

 Gastrointestinal                    No abdominal pain                    2018                

 

 Gastrointestinal                    No diarrhea                    2018 
               

 

 Gastrointestinal                    No constipation                    2018                

 

 Gastrointestinal                    No gastroesophageal reflux                
    2018                

 

 Gastrointestinal                    No hematochezia                    2018                

 

 Gastrointestinal                    No melena                    2018   
             

 

 Gastrointestinal                    nausea                    2018      
          

 

 Gastrointestinal                    vomiting                    2018    
            

 

 Musculoskeletal                    arthralgia(s)                    2018
                

 

 Dermatologic                    No rash                    2018         
       

 

 Neurologic                    No alteration of consciousness                  
  2018                

 

 Neurologic                    No mental status change                    2018                

 

 Constitutional                    No recent illness                    2018                

 

 Constitutional                    No diaphoresis                    2018
                

 

 Constitutional                    No chills                    2018     
           

 

 Constitutional                    fatigue                    2018       
         

 

 Constitutional                    No fever                    2018      
          

 

 Constitutional                    malaise                    2018       
         

 

 Constitutional                    weight loss                    2018   
             

 

 Eyes                    No eye erythema                    2018         
       

 

 Ears/Nose/Throat/Neck                    No nasal discharge                    
2018                

 

 Ears/Nose/Throat/Neck                    No nasal allergies                    
2018                

 

 Cardiovascular                    No chest pain/pressure                                    

 

 Cardiovascular                    No dyspnea                    2018    
            

 

 Respiratory                    No cough                    2018         
       

 

 Respiratory                    No chest congestion                    2018                

 

 Gastrointestinal                    No abdominal pain                    2018                

 

 Gastrointestinal                    No constipation                    2018                

 

 Gastrointestinal                    No diarrhea                    2018 
               

 

 Gastrointestinal                    No gastroesophageal reflux                
    2018                

 

 Gastrointestinal                    No hematochezia                    2018                

 

 Gastrointestinal                    No melena                    2018   
             

 

 Gastrointestinal                    nausea                    2018      
          

 

 Gastrointestinal                    No vomiting                    2018 
               

 

 Gastrointestinal                    anorexia                    2018    
            

 

 Musculoskeletal                    No joint complaint                    2018                

 

 Musculoskeletal                    arthralgia(s)                    2018
                

 

 Dermatologic                    No rash                    2018         
       

 

 Neurologic                    No alteration of consciousness                  
  2018                

 

 Neurologic                    No mental status change                    2018                

 

 Psychiatric                    anxiety                    2018          
      

 

 Psychiatric                    depression                    2018       
         

 

 Constitutional                    No night sweats                    2018                

 

 Constitutional                    No insomnia                    2018   
             

 

 Constitutional                    No weight gain                    2018
                

 

 Eyes                    No eye discharge                    2018        
        

 

 Eyes                    No eye erythema                    2018         
       

 

 Ears/Nose/Throat/Neck                    dizziness                    2018                

 

 Respiratory                    No productive sputum                    2018                

 

 Genitourinary/Nephrology                    No dysuria                                    

 

 Constitutional                    No recent illness                    2017                

 

 Constitutional                    No chills                    2017     
           

 

 Constitutional                    No diaphoresis                    2017
                

 

 Constitutional                    No fever                    2017      
          

 

 Eyes                    No eye erythema                    2017         
       

 

 Ears/Nose/Throat/Neck                    No nasal discharge                    
2017                

 

 Ears/Nose/Throat/Neck                    No nasal allergies                    
2017                

 

 Cardiovascular                    No chest pain/pressure                                    

 

 Cardiovascular                    No dyspnea                    2017    
            

 

 Respiratory                    No cough                    2017         
       

 

 Respiratory                    No chest congestion                    2017                

 

 Gastrointestinal                    No abdominal pain                    2017                

 

 Musculoskeletal                    joint complaint                    2017                

 

 Neurologic                    No alteration of consciousness                  
  2017                

 

 Neurologic                    No mental status change                    2017                

 

 Constitutional                    recent illness                    05/15/2017
                

 

 Constitutional                    No chills                    05/15/2017     
           

 

 Constitutional                    fatigue                    05/15/2017       
         

 

 Constitutional                    No fever                    05/15/2017      
          

 

 Constitutional                    malaise                    05/15/2017       
         

 

 Eyes                    No eye discharge                    05/15/2017        
        

 

 Eyes                    No eye erythema                    05/15/2017         
       

 

 Ears/Nose/Throat/Neck                    nasal allergies                    05/
15/2017                

 

 Ears/Nose/Throat/Neck                    No nasal discharge                    
05/15/2017                

 

 Cardiovascular                    No chest pain/pressure                    05/
15/2017                

 

 Respiratory                    cough                    05/15/2017            
    

 

 Gastrointestinal                    No abdominal pain                    05/15/
2017                

 

 Gastrointestinal                    No constipation                    05/15/
2017                

 

 Gastrointestinal                    No diarrhea                    05/15/2017 
               

 

 Dermatologic                    No rash                    05/15/2017         
       

 

 Neurologic                    No alteration of consciousness                  
  05/15/2017                

 

 Ears/Nose/Throat/Neck                    dizziness                    05/15/
2017                

 

 Respiratory                    chest congestion                    05/15/2017 
               

 

 Respiratory                    dyspnea on exertion                    05/15/
2017                

 

 Respiratory                    No dyspnea                    05/15/2017       
         

 

 Respiratory                    wheezing                    05/15/2017         
       

 

 Neurologic                    No mental status change                    05/15/
2017                

 

 Constitutional                    No recent illness                    2017                

 

 Constitutional                    No anorexia                    2017   
             

 

 Constitutional                    No night sweats                    2017                

 

 Constitutional                    No chills                    2017     
           

 

 Constitutional                    No diaphoresis                    2017
                

 

 Constitutional                    No fatigue                    2017    
            

 

 Constitutional                    No fever                    2017      
          

 

 Constitutional                    No insomnia                    2017   
             

 

 Constitutional                    No malaise                    2017    
            

 

 Constitutional                    No weight loss                    2017
                

 

 Constitutional                    No weight gain                    2017
                

 

 Eyes                    No eye discharge                    2017        
        

 

 Eyes                    No eye erythema                    2017         
       

 

 Ears/Nose/Throat/Neck                    dizziness                    2017                

 

 Ears/Nose/Throat/Neck                    No nasal discharge                    
2017                

 

 Ears/Nose/Throat/Neck                    nasal allergies                    2017                

 

 Cardiovascular                    No chest pain/pressure                    2017                

 

 Cardiovascular                    No dyspnea                    2017    
            

 

 Respiratory                    No productive sputum                    2017                

 

 Respiratory                    No cough                    2017         
       

 

 Gastrointestinal                    No abdominal pain                    2017                

 

 Gastrointestinal                    No constipation                    2017                

 

 Gastrointestinal                    No diarrhea                    2017 
               

 

 Genitourinary/Nephrology                    No dysuria                                    

 

 Musculoskeletal                    No joint complaint                    2017                

 

 Dermatologic                    No rash                    2017         
       

 

 Neurologic                    No alteration of consciousness                  
  2017                

 

 Constitutional                    No recent illness                    2017                

 

 Constitutional                    No chills                    2017     
           

 

 Constitutional                    No fever                    2017      
          

 

 Eyes                    No eye erythema                    2017         
       

 

 Ears/Nose/Throat/Neck                    No nasal discharge                    
2017                

 

 Cardiovascular                    No chest pain/pressure                                    

 

 Cardiovascular                    No dyspnea                    2017    
            

 

 Respiratory                    No cough                    2017         
       

 

 Respiratory                    No dyspnea                    2017       
         

 

 Musculoskeletal                    joint complaint                    2017                

 

 Neurologic                    No alteration of consciousness                  
  2017                

 

 Neurologic                    No mental status change                    2017                

 

 Constitutional                    No recent illness                    06/15/
2016                

 

 Constitutional                    No night sweats                    06/15/
2016                

 

 Constitutional                    No chills                    06/15/2016     
           

 

 Constitutional                    No fatigue                    06/15/2016    
            

 

 Constitutional                    No fever                    06/15/2016      
          

 

 Constitutional                    No insomnia                    06/15/2016   
             

 

 Constitutional                    No malaise                    06/15/2016    
            

 

 Eyes                    No blindness                    06/15/2016            
    

 

 Eyes                    No vision change                    06/15/2016        
        

 

 Ears/Nose/Throat/Neck                    No dental pain                    06/
15/2016                

 

 Ears/Nose/Throat/Neck                    No dizziness                    06/15/
2016                

 

 Ears/Nose/Throat/Neck                    No dysphagia                    06/15/
2016                

 

 Ears/Nose/Throat/Neck                    No headache                    06/15/
2016                

 

 Ears/Nose/Throat/Neck                    No hearing loss                    06/
15/2016                

 

 Ears/Nose/Throat/Neck                    No nasal allergies                    
06/15/2016                

 

 Ears/Nose/Throat/Neck                    No sore throat                    06/
15/2016                

 

 Ears/Nose/Throat/Neck                    No postnasal drip                    
06/15/2016                

 

 Ears/Nose/Throat/Neck                    No sinus congestion                  
  06/15/2016                

 

 Cardiovascular                    No chest pain/pressure                    06/
15/2016                

 

 Cardiovascular                    No dyspnea                    06/15/2016    
            

 

 Cardiovascular                    No edema                    06/15/2016      
          

 

 Cardiovascular                    No exercise intolerance                    06
/15/2016                

 

 Cardiovascular                    No fatigue                    06/15/2016    
            

 

 Cardiovascular                    No hypertension                    06/15/
2016                

 

 Cardiovascular                    No near-syncope/dizziness                    
06/15/2016                

 

 Cardiovascular                    No palpitations                    06/15/
2016                

 

 Respiratory                    No chest tightness                    06/15/
2016                

 

 Respiratory                    No cigarette smoking                    06/15/
2016                

 

 Respiratory                    No cough                    06/15/2016         
       

 

 Respiratory                    No dyspnea on exertion                    06/15/
2016                

 

 Respiratory                    No dyspnea                    06/15/2016       
         

 

 Respiratory                    No pedal edema                    06/15/2016   
             

 

 Gastrointestinal                    No abdominal pain                    06/15/
2016                

 

 Gastrointestinal                    No constipation                    06/15/
2016                

 

 Gastrointestinal                    No diarrhea                    06/15/2016 
               

 

 Gastrointestinal                    No gastroesophageal reflux                
    06/15/2016                

 

 Gastrointestinal                    No hematochezia                    06/15/
2016                

 

 Gastrointestinal                    No nausea                    06/15/2016   
             

 

 Gastrointestinal                    No vomiting                    06/15/2016 
               

 

 Genitourinary/Nephrology                    No dysuria                    06/15
/2016                

 

 Genitourinary/Nephrology                    No impotence                    06/
15/2016                

 

 Genitourinary/Nephrology                    No nocturia                    06/
15/2016                

 

 Genitourinary/Nephrology                    No urinary urgency                
    06/15/2016                

 

 Genitourinary/Nephrology                    No urinary frequency              
      06/15/2016                

 

 Genitourinary/Nephrology                    No urinary incontinence           
         06/15/2016                

 

 Musculoskeletal                    No stiffness                    06/15/2016 
               

 

 Musculoskeletal                    No swelling                    06/15/2016  
              

 

 Musculoskeletal                    No arthralgia(s)                    06/15/
2016                

 

 Musculoskeletal                    No joint complaint                    06/15/
2016                

 

 Musculoskeletal                    No muscle weakness                    06/15/
2016                

 

 Musculoskeletal                    No myalgias                    06/15/2016  
              

 

 Dermatologic                    mole change                    06/15/2016     
           

 

 Dermatologic                    No rash                    06/15/2016         
       

 

 Dermatologic                    No sores                    06/15/2016        
        

 

 Dermatologic                    No scar                    06/15/2016         
       

 

 Neurologic                    No ataxia                    06/15/2016         
       

 

 Neurologic                    No dizziness                    06/15/2016      
          

 

 Neurologic                    No dyskinesia or tremor                    06/15/
2016                

 

 Neurologic                    No gait abnormality                    06/15/
2016                

 

 Neurologic                    No headache                    06/15/2016       
         

 

 Neurologic                    No neck pain                    06/15/2016      
          

 

 Neurologic                    No syncope                    06/15/2016        
        

 

 Psychiatric                    No anxiety                    06/15/2016       
         

 

 Psychiatric                    No depression                    06/15/2016    
            

 

 Constitutional                    No recent illness                    2015                

 

 Constitutional                    No chills                    2015     
           

 

 Constitutional                    No fatigue                    2015    
            

 

 Constitutional                    No fever                    2015      
          

 

 Constitutional                    No insomnia                    2015   
             

 

 Constitutional                    No malaise                    2015    
            

 

 Eyes                    No blindness                    2015            
    

 

 Eyes                    No vision change                    2015        
        

 

 Ears/Nose/Throat/Neck                    No dental pain                                    

 

 Ears/Nose/Throat/Neck                    No dizziness                    2015                

 

 Ears/Nose/Throat/Neck                    No dysphagia                    2015                

 

 Ears/Nose/Throat/Neck                    No headache                    2015                

 

 Ears/Nose/Throat/Neck                    No hearing loss                                    

 

 Ears/Nose/Throat/Neck                    No nasal allergies                    
2015                

 

 Ears/Nose/Throat/Neck                    No sore throat                                    

 

 Ears/Nose/Throat/Neck                    No postnasal drip                    
2015                

 

 Ears/Nose/Throat/Neck                    No sinus congestion                  
  2015                

 

 Cardiovascular                    No chest pain/pressure                                    

 

 Cardiovascular                    No dyspnea                    2015    
            

 

 Cardiovascular                    No edema                    2015      
          

 

 Cardiovascular                    No exercise intolerance                                    

 

 Cardiovascular                    No fatigue                    2015    
            

 

 Cardiovascular                    No near-syncope/dizziness                    
2015                

 

 Respiratory                    No chest tightness                    2015                

 

 Respiratory                    No cough                    2015         
       

 

 Respiratory                    No dyspnea                    2015       
         

 

 Respiratory                    No pedal edema                    2015   
             

 

 Gastrointestinal                    No abdominal pain                    2015                

 

 Gastrointestinal                    No constipation                    2015                

 

 Gastrointestinal                    No diarrhea                    2015 
               

 

 Gastrointestinal                    No gastroesophageal reflux                
    2015                

 

 Gastrointestinal                    No nausea                    2015   
             

 

 Gastrointestinal                    No vomiting                    2015 
               

 

 Genitourinary/Nephrology                    No dysuria                                    

 

 Genitourinary/Nephrology                    No nocturia                                    

 

 Genitourinary/Nephrology                    No urinary incontinence           
         2015                

 

 Musculoskeletal                    No stiffness                    2015 
               

 

 Musculoskeletal                    No swelling                    2015  
              

 

 Musculoskeletal                    No muscle weakness                    2015                

 

 Musculoskeletal                    No myalgias                    2015  
              

 

 Dermatologic                    No rash                    2015         
       

 

 Dermatologic                    No sores                    2015        
        

 

 Dermatologic                    No scar                    2015         
       

 

 Neurologic                    No dizziness                    2015      
          

 

 Neurologic                    No headache                    2015       
         

 

 Neurologic                    No neck pain                    2015      
          

 

 Neurologic                    No syncope                    2015        
        

 

 Psychiatric                    No anxiety                    2015       
         

 

 Psychiatric                    No depression                    2015    
            

 

 Constitutional                    No night sweats                    2015                

 

 Cardiovascular                    No hypertension                    2015                

 

 Cardiovascular                    No palpitations                    2015                

 

 Respiratory                    No cigarette smoking                    2015                

 

 Respiratory                    No dyspnea on exertion                    2015                

 

 Gastrointestinal                    No hematochezia                    2015                

 

 Genitourinary/Nephrology                    No impotence                                    

 

 Genitourinary/Nephrology                    No urinary frequency              
      2015                

 

 Genitourinary/Nephrology                    No urinary urgency                
    2015                

 

 Musculoskeletal                    No arthralgia(s)                    2015                

 

 Musculoskeletal                    No joint complaint                    2015                

 

 Neurologic                    No ataxia                    2015         
       

 

 Neurologic                    No dyskinesia or tremor                    2015                

 

 Neurologic                    No gait abnormality                    2015                

 

 Dermatologic                    mole change                    2015     
           

 

 Constitutional                    No recent illness                    2013                

 

 Constitutional                    No chills                    2013     
           

 

 Constitutional                    No fatigue                    2013    
            

 

 Constitutional                    No fever                    2013      
          

 

 Constitutional                    No insomnia                    2013   
             

 

 Constitutional                    No malaise                    2013    
            

 

 Cardiovascular                    No chest pain/pressure                                    

 

 Cardiovascular                    No dyspnea                    2013    
            

 

 Cardiovascular                    No edema                    2013      
          

 

 Cardiovascular                    No exercise intolerance                                    

 

 Cardiovascular                    No fatigue                    2013    
            

 

 Cardiovascular                    No near-syncope/dizziness                    
2013                

 

 Respiratory                    No chest tightness                    2013                

 

 Respiratory                    No cigarette smoking                    2013                

 

 Respiratory                    No cough                    2013         
       

 

 Respiratory                    No dyspnea                    2013       
         

 

 Respiratory                    No pedal edema                    2013   
             

 

 Respiratory                    No snoring                    2013       
         

 

 Respiratory                    No wheezing                    2013      
          

 

 Psychiatric                    No anxiety                    2013       
         

 

 Psychiatric                    No depression                    2013    
            

 

 Dermatologic                    No rash                    2013         
       

 

 Dermatologic                    No scar                    2013         
       

 

 Gastrointestinal                    No hemorrhoids                    2013                

 

 Gastrointestinal                    No abdominal pain                    2013                

 

 Gastrointestinal                    No constipation                    2013                

 

 Gastrointestinal                    No diarrhea                    2013 
               

 

 Gastrointestinal                    No gastroesophageal reflux                
    2013                

 

 Gastrointestinal                    No melena                    2013   
             

 

 Gastrointestinal                    No nausea                    2013   
             

 

 Gastrointestinal                    No vomiting                    2013 
               

 

 Constitutional                    No chills                    2013     
           

 

 Constitutional                    No fatigue                    2013    
            

 

 Constitutional                    No fever                    2013      
          

 

 Eyes                    No vision change                    2013        
        

 

 Ears/Nose/Throat/Neck                    No dizziness                    2013                

 

 Ears/Nose/Throat/Neck                    No headache                    2013                

 

 Cardiovascular                    No chest pain/pressure                                    

 

 Respiratory                    No dyspnea                    2013       
         

 

 Gastrointestinal                    No constipation                    2013                

 

 Gastrointestinal                    No diarrhea                    2013 
               

 

 Gastrointestinal                    No nausea                    2013   
             

 

 Gastrointestinal                    No vomiting                    2013 
               

 

 Neurologic                    No dizziness                    2013      
          

 

 Psychiatric                    No anxiety                    2013       
         

 

 Psychiatric                    No depression                    2013    
            

 

 Constitutional                    recent illness                    2012
                

 

 Constitutional                    No anorexia                    2012   
             

 

 Constitutional                    No night sweats                    2012                

 

 Constitutional                    No chills                    2012     
           

 

 Constitutional                    No diaphoresis                    2012
                

 

 Constitutional                    No fatigue                    2012    
            

 

 Constitutional                    No fever                    2012      
          

 

 Constitutional                    No insomnia                    2012   
             

 

 Constitutional                    No malaise                    2012    
            

 

 Eyes                    No eye discharge                    2012        
        

 

 Eyes                    No eye erythema                    2012         
       

 

 Cardiovascular                    No chest pain/pressure                                    

 

 Respiratory                    cough                    2012            
    

 

 Respiratory                    chest congestion                    2012 
               

 

 Respiratory                    No dyspnea on exertion                    2012                

 

 Respiratory                    No dyspnea                    2012       
         

 

 Gastrointestinal                    No vomiting                    2012 
               

 

 Gastrointestinal                    No nausea                    2012   
             

 

 Gastrointestinal                    No constipation                    2012                

 

 Gastrointestinal                    No diarrhea                    2012 
               

 

 Gastrointestinal                    No abdominal pain                    2012                

 

 Genitourinary/Nephrology                    No dysuria                                    

 

 Musculoskeletal                    No joint complaint                    2012                

 

 Constitutional                    No chills                    2012     
           

 

 Constitutional                    No fatigue                    2012    
            

 

 Eyes                    No vision change                    2012        
        

 

 Respiratory                    No dyspnea                    2012       
         

 

 Constitutional                    No fever                    2012      
          

 

 Ears/Nose/Throat/Neck                    No headache                    2012                

 

 Ears/Nose/Throat/Neck                    No dizziness                    2012                

 

 Cardiovascular                    No chest pain/pressure                                    

 

 Gastrointestinal                    No constipation                    2012                

 

 Gastrointestinal                    No diarrhea                    2012 
               

 

 Gastrointestinal                    No vomiting                    2012 
               

 

 Gastrointestinal                    No nausea                    2012   
             

 

 Neurologic                    No dizziness                    2012      
          

 

 Psychiatric                    No anxiety                    2012       
         

 

 Psychiatric                    No depression                    2012    
            

 

 Constitutional                    No fever                    2012      
          

 

 Cardiovascular                    No chest pain/pressure                                    

 

 Ears/Nose/Throat/Neck                    No dizziness                    2012                

 

 Ears/Nose/Throat/Neck                    No headache                    2012                

 

 Gastrointestinal                    No constipation                    2012                

 

 Gastrointestinal                    No diarrhea                    2012 
               

 

 Gastrointestinal                    No nausea                    2012   
             

 

 Gastrointestinal                    No vomiting                    2012 
               

 

 Neurologic                    No dizziness                    2012      
          

 

 Psychiatric                    No anxiety                    2012       
         

 

 Psychiatric                    No depression                    2012    
            



                                                                    



Physical Exam

                      





 Exam Name                    System Name                    Item Name         
           Status                    Result                    Effective Dates 
                   Notes                

 

 Full Exam - General                     Constitutional                     
general appearance                    Overall:                    well 
developed                    03/15/2018                    None                

 

 Full Exam - General                     Constitutional                     
general appearance                    Overall:                    in no acute 
distress                    03/15/2018                    None                

 

 Full Exam - General                     Constitutional                     
general appearance                    Overall:                    well 
nourished                    03/15/2018                    None                

 

 Full Exam - General                     Eyes                    conjunctiva
/eyelids                    Overall:                    conjunctiva clear      
              03/15/2018                    None                

 

 Full Exam - General                     Eyes                    conjunctiva
/eyelids                    Overall:                    cornea clear           
         03/15/2018                    None                

 

 Full Exam - General                     Eyes                    conjunctiva
/eyelids                    Overall:                    eyelids normal         
           03/15/2018                    None                

 

 Full Exam - General                     Ears/Nose/Throat                  
  lips/teeth/gingiva                    Overall:                    benign lips
                    03/15/2018                    None                

 

 Full Exam - General                     Ears/Nose/Throat                  
  oral cavity/pharynx/larynx                     Overall:                    
oral mucosa clear                    03/15/2018                    None        
        

 

 Full Exam - General                     Respiratory                    
auscultation                    Overall:                    breath sounds clear 
bilaterally                    03/15/2018                    None              
  

 

 Full Exam - General                     Respiratory                    
respiratory effort/rhythm                    Overall:                    no 
retractions                    03/15/2018                    None              
  

 

 Full Exam - General                     Respiratory                    
respiratory effort/rhythm                    Overall:                    normal 
rate                    03/15/2018                    None                

 

 Full Exam - General                     Cardiovascular                    
auscultation of heart                    Overall:                    regular 
rate                    03/15/2018                    None                

 

 Full Exam - General                     Cardiovascular                    
auscultation of heart                    Overall:                    normal 
heart sounds                    03/15/2018                    None             
   

 

 Full Exam - General                     Abdomen                    
abdominal exam                    Overall:                    no tenderness    
                03/15/2018                    None                

 

 Full Exam - General                     Abdomen                    
abdominal exam                    Overall:                    normal bowel 
sounds                    03/15/2018                    None                

 

 Full Exam - General                     Musculoskeletal                    
gait and station                    Overall:                    normal gait    
                03/15/2018                    None                

 

 Full Exam - General                     Musculoskeletal                    
gait and station                    Overall:                    normal station 
                   03/15/2018                    None                

 

 Full Exam - General                     Musculoskeletal                    
head and neck                    Overall:                    head atraumatic   
                 03/15/2018                    None                

 

 Full Exam - General                     Neurologic                    
cranial nerves                    Overall:                    crainial nerves 2 
- 12 grossly intact                    03/15/2018                    None      
          

 

 Full Exam - General                     Psychiatric                    
orientation/consciousness                    Overall:                    
oriented to person, place and time                    03/15/2018               
     None                

 

 Full Exam - General                     Psychiatric                    
mood and affect                    Overall:                    normal mood and 
affect                    03/15/2018                    None                

 

 Full Exam - General                     Psychiatric                    
appearance                    Overall:                    well-groomed, good 
eye contact                    03/15/2018                    None              
  

 

 Full Exam - General                     Constitutional                     
general appearance                    Overall:                    well 
developed                    2018                    None                

 

 Full Exam - General                     Constitutional                     
general appearance                    Overall:                    in no acute 
distress                    2018                    None                

 

 Full Exam - General                     Constitutional                     
general appearance                    Overall:                    well 
nourished                    2018                    None                

 

 Full Exam - General                     Eyes                    conjunctiva
/eyelids                    Overall:                    conjunctiva clear      
              2018                    None                

 

 Full Exam - General                     Eyes                    conjunctiva
/eyelids                    Overall:                    cornea clear           
         2018                    None                

 

 Full Exam - General                     Eyes                    conjunctiva
/eyelids                    Overall:                    eyelids normal         
           2018                    None                

 

 Full Exam - General                     Ears/Nose/Throat                  
  lips/teeth/gingiva                    Overall:                    benign lips
                    2018                    None                

 

 Full Exam - General                     Ears/Nose/Throat                  
  oral cavity/pharynx/larynx                     Overall:                    
oral mucosa clear                    2018                    None        
        

 

 Full Exam - General                     Respiratory                    
auscultation                    Overall:                    breath sounds clear 
bilaterally                    2018                    None              
  

 

 Full Exam - General                     Respiratory                    
respiratory effort/rhythm                    Overall:                    no 
retractions                    2018                    None              
  

 

 Full Exam - General                     Respiratory                    
respiratory effort/rhythm                    Overall:                    normal 
rate                    2018                    None                

 

 Full Exam - General                     Cardiovascular                    
auscultation of heart                    Overall:                    regular 
rate                    2018                    None                

 

 Full Exam - General                     Cardiovascular                    
auscultation of heart                    Overall:                    normal 
heart sounds                    2018                    None             
   

 

 Full Exam - General                     Abdomen                    
abdominal exam                    Overall:                    no tenderness    
                2018                    None                

 

 Full Exam - General                     Abdomen                    
abdominal exam                    Overall:                    normal bowel 
sounds                    2018                    None                

 

 Full Exam - General                     Musculoskeletal                    
gait and station                    Overall:                    normal gait    
                2018                    None                

 

 Full Exam - General                     Musculoskeletal                    
gait and station                    Overall:                    normal station 
                   2018                    None                

 

 Full Exam - General                     Musculoskeletal                    
head and neck                    Overall:                    head atraumatic   
                 2018                    None                

 

 Full Exam - General                     Neurologic                    
cranial nerves                    Overall:                    crainial nerves 2 
- 12 grossly intact                    2018                    None      
          

 

 Full Exam - General                     Psychiatric                    
orientation/consciousness                    Overall:                    
oriented to person, place and time                    2018               
     None                

 

 Full Exam - General                     Psychiatric                    
mood and affect                    Overall:                    normal mood and 
affect                    2018                    None                

 

 Full Exam - General                     Psychiatric                    
appearance                    Overall:                    well-groomed, good 
eye contact                    2018                    None              
  

 

 Full Exam - General                     Constitutional                     
general appearance                    Overall:                    well 
developed                    2018                    None                

 

 Full Exam - General                     Constitutional                     
general appearance                    Overall:                    in no acute 
distress                    2018                    None                

 

 Full Exam - General                     Constitutional                     
general appearance                    Overall:                    well 
nourished                    2018                    None                

 

 Full Exam - General                     Eyes                    conjunctiva
/eyelids                    Overall:                    conjunctiva clear      
              2018                    None                

 

 Full Exam - General                     Eyes                    conjunctiva
/eyelids                    Overall:                    cornea clear           
         2018                    None                

 

 Full Exam - General                     Eyes                    conjunctiva
/eyelids                    Overall:                    eyelids normal         
           2018                    None                

 

 Full Exam - General                     Ears/Nose/Throat                  
  lips/teeth/gingiva                    Overall:                    benign lips
                    2018                    None                

 

 Full Exam - General                     Ears/Nose/Throat                  
  oral cavity/pharynx/larynx                     Overall:                    
oral mucosa clear                    2018                    None        
        

 

 Full Exam - General                     Respiratory                    
respiratory effort/rhythm                    Overall:                    normal 
rate                    2018                    None                

 

 Full Exam - General                     Respiratory                    
respiratory effort/rhythm                    Overall:                    no 
retractions                    2018                    None              
  

 

 Full Exam - General                     Respiratory                    
auscultation                    Overall:                    breath sounds clear 
bilaterally                    2018                    None              
  

 

 Full Exam - General                     Cardiovascular                    
auscultation of heart                    Overall:                    regular 
rate                    2018                    None                

 

 Full Exam - General                     Cardiovascular                    
auscultation of heart                    Overall:                    normal 
heart sounds                    2018                    None             
   

 

 Full Exam - General                     Abdomen                    
abdominal exam                    Overall:                    normal bowel 
sounds                    2018                    None                

 

 Full Exam - General                     Abdomen                    
abdominal exam                    Overall:                    no tenderness    
                2018                    None                

 

 Full Exam - General                     Musculoskeletal                    
gait and station                    Overall:                    normal gait    
                2018                    None                

 

 Full Exam - General                     Musculoskeletal                    
gait and station                    Overall:                    normal station 
                   2018                    None                

 

 Full Exam - General                     Musculoskeletal                    
head and neck                    Overall:                    head atraumatic   
                 2018                    None                

 

 Full Exam - General                     Neurologic                    
cranial nerves                    Overall:                    crainial nerves 2 
- 12 grossly intact                    2018                    None      
          

 

 Full Exam - General                     Psychiatric                    
orientation/consciousness                    Overall:                    
oriented to person, place and time                    2018               
     None                

 

 Full Exam - General                     Psychiatric                    
mood and affect                    Overall:                    normal mood and 
affect                    2018                    None                

 

 Full Exam - General                     Psychiatric                    
appearance                    Overall:                    well-groomed, good 
eye contact                    2018                    None              
  

 

 Full Exam - General                     Constitutional                     
general appearance                    Hygiene/Attention to Grooming:           
         good hygiene                    2018                    None    
            

 

 Full Exam - General                     Eyes                    conjunctiva
/eyelids                    Overall:                    conjunctiva clear      
              2018                    None                

 

 Full Exam - General                     Eyes                    conjunctiva
/eyelids                    Overall:                    cornea clear           
         2018                    None                

 

 Full Exam - General                     Eyes                    conjunctiva
/eyelids                    Overall:                    eyelids normal         
           2018                    None                

 

 Full Exam - General                     Eyes                    pupils and 
irises                    Overall:                    pupils equal, round, 
reactive to light and accomodation                    2018               
     None                

 

 Full Exam - General                     Ears/Nose/Throat                  
  otoscopic exam                    Overall:                    external 
auditory canals clear                    2018                    None    
            

 

 Full Exam - General                     Ears/Nose/Throat                  
  otoscopic exam                    Overall:                    tympanic 
membranes clear                    2018                    None          
      

 

 Full Exam - General                     Ears/Nose/Throat                  
  hearing assessment                    Overall:                    hearing 
intact bilaterally                    2018                    None       
         

 

 Full Exam - General                     Ears/Nose/Throat                  
  lips/teeth/gingiva                    Overall:                    benign lips
                    2018                    None                

 

 Full Exam - General                     Ears/Nose/Throat                  
  lips/teeth/gingiva                    Overall:                    normal 
dentition                    2018                    None                

 

 Full Exam - General                     Ears/Nose/Throat                  
  lips/teeth/gingiva                    Overall:                    benign 
gingiva                    2018                    None                

 

 Full Exam - General                     Ears/Nose/Throat                  
  oral cavity/pharynx/larynx                     Overall:                    
oral mucosa clear                    2018                    None        
        

 

 Full Exam - General                     Ears/Nose/Throat                  
  oral cavity/pharynx/larynx                     Overall:                    
oropharyngeal mucosa clear                    2018                    
None                

 

 Full Exam - General                     Respiratory                    
auscultation                    Overall:                    breath sounds clear 
bilaterally                    2018                    None              
  

 

 Full Exam - General                     Respiratory                    
respiratory effort/rhythm                    Overall:                    no 
retractions                    2018                    None              
  

 

 Full Exam - General                     Respiratory                    
respiratory effort/rhythm                    Overall:                    normal 
rate                    2018                    None                

 

 Full Exam - General                     Cardiovascular                    
extremities                    Overall:                    no clubbing         
           2018                    None                

 

 Full Exam - General                     Cardiovascular                    
auscultation of heart                    Overall:                    regular 
rate                    2018                    None                

 

 Full Exam - General                     Cardiovascular                    
auscultation of heart                    Overall:                    normal 
heart sounds                    2018                    None             
   

 

 Full Exam - General                     Abdomen                    
abdominal exam                    Overall:                    no tenderness    
                2018                    None                

 

 Full Exam - General                     Abdomen                    
abdominal exam                    Overall:                    normal bowel 
sounds                    2018                    None                

 

 Full Exam - General                     Lymphatic                    neck 
nodes                    Overall:                    anterior cervical chain 
benign                    2018                    None                

 

 Full Exam - General                     Lymphatic                    neck 
nodes                    Overall:                    posterior cervical chain 
benign                    2018                    None                

 

 Full Exam - General                     Musculoskeletal                    
spine, ribs and pelvis                    Overall:                    good 
posture                    2018                    None                

 

 Full Exam - General                     Musculoskeletal                    
gait and station                    Overall:                    normal gait    
                2018                    None                

 

 Full Exam - General                     Musculoskeletal                    
gait and station                    Overall:                    normal station 
                   2018                    None                

 

 Full Exam - General                     Musculoskeletal                    
head and neck                    Overall:                    head atraumatic   
                 2018                    None                

 

 Full Exam - General                     Neurologic                    
cranial nerves                    Overall:                    crainial nerves 2 
- 12 grossly intact                    2018                    None      
          

 

 Full Exam - General                     Psychiatric                    
orientation/consciousness                    Overall:                    
oriented to person, place and time                    2018               
     None                

 

 Full Exam - General                     Constitutional                     
general appearance                    Overall:                    well 
developed                    2018                    None                

 

 Full Exam - General                     Constitutional                     
general appearance                    Overall:                    well 
nourished                    2018                    None                

 

 Full Exam - General                     Constitutional                     
general appearance                    Evidence of Distress:                    
mild distress                    2018                    None            
    

 

 Full Exam - General                     Constitutional                     
general appearance                    Evidence of Distress:                    
tearful                    2018                    None                

 

 Full Exam - General                     Ears/Nose/Throat                  
  otoscopic exam                    Tympanic membrane:                    air-
fluid level                    2018                    None              
  

 

 Full Exam - General                     Psychiatric                    
mood and affect                    Mood:                    flat               
     2018                    None                

 

 Full Exam - General                     Psychiatric                    
mood and affect                    Mood:                    depressed          
          2018                    None                

 

 Full Exam - General                     Psychiatric                    
mood and affect                    Mood:                    anxious            
        2018                    None                

 

 Full Exam - General                     Psychiatric                    
mood and affect                    Affect:                    flat             
       2018                    None                

 

 Full Exam - Orthopedics                    Constitutional                     
general appearance                    Overall:                    well 
nourished                    2017                    None                

 

 Full Exam - Orthopedics                    Constitutional                     
general appearance                    Overall:                    well 
developed                    2017                    None                

 

 Full Exam - Orthopedics                    Constitutional                     
general appearance                    Overall:                    in no acute 
distress                    2017                    None                

 

 Full Exam - Orthopedics                    Eyes                    conjunctiva/
eyelids                    Overall:                    conjunctiva clear       
             2017                    None                

 

 Full Exam - Orthopedics                    Eyes                    conjunctiva/
eyelids                    Overall:                    eyelids normal          
          2017                    None                

 

 Full Exam - Orthopedics                    Eyes                    conjunctiva/
eyelids                    Overall:                    cornea clear            
        2017                    None                

 

 Full Exam - Orthopedics                    Ears/Nose/Throat                    
oral cavity/pharynx/larynx                     Overall:                    oral 
mucosa clear                    2017                    None             
   

 

 Full Exam - Orthopedics                    Ears/Nose/Throat                    
lips/teeth/gingiva                    Overall:                    benign lips  
                  2017                    None                

 

 Full Exam - Orthopedics                    Respiratory                    
respiratory effort/rhythm                    Overall:                    no 
retractions                    2017                    None              
  

 

 Full Exam - Orthopedics                    Respiratory                    
respiratory effort/rhythm                    Overall:                    normal 
rate                    2017                    None                

 

 Full Exam - Orthopedics                    Respiratory                    
auscultation                    Overall:                    breath sounds clear 
bilaterally                    2017                    None              
  

 

 Full Exam - Orthopedics                    MS: left upper extremity           
         insp & palp - LUE                    Wrist:                    
swelling                    2017                    None                

 

 Full Exam - Orthopedics                    MS: left upper extremity           
         insp & palp - LUE                    Wrist:                    tender 
                   2017                    None                

 

 Full Exam - Orthopedics                    MS: left upper extremity           
         range of motion - LUE                    Wrist:                    
pain with extension                    2017                    None      
          

 

 Full Exam - Orthopedics                    MS: left upper extremity           
         range of motion - LUE                    Wrist:                    
pain with flexion                    2017                    None        
        

 

 Full Exam - Orthopedics                    Psychiatric                    
orientation/consciousness                    Overall:                    
oriented to person, place and time                    2017               
     None                

 

 Full Exam - Orthopedics                    Psychiatric                    mood 
and affect                    Overall:                    normal mood and 
affect                    2017                    None                

 

 Full Exam - Orthopedics                    Psychiatric                    
appearance                    Overall:                    well-groomed, good 
eye contact                    2017                    None              
  

 

 Full Exam - General                     Constitutional                     
general appearance                    Hygiene/Attention to Grooming:           
         good hygiene                    05/15/2017                    None    
            

 

 Full Exam - General                     Eyes                    conjunctiva
/eyelids                    Overall:                    conjunctiva clear      
              05/15/2017                    None                

 

 Full Exam - General                     Eyes                    conjunctiva
/eyelids                    Overall:                    eyelids normal         
           05/15/2017                    None                

 

 Full Exam - General                     Ears/Nose/Throat                  
  otoscopic exam                    Overall:                    external 
auditory canals clear                    05/15/2017                    None    
            

 

 Full Exam - General                     Ears/Nose/Throat                  
  otoscopic exam                    Overall:                    tympanic 
membranes clear                    05/15/2017                    None          
      

 

 Full Exam - General                     Ears/Nose/Throat                  
  lips/teeth/gingiva                    Overall:                    benign lips
                    05/15/2017                    None                

 

 Full Exam - General                     Ears/Nose/Throat                  
  oral cavity/pharynx/larynx                     Overall:                    
oral mucosa clear                    05/15/2017                    None        
        

 

 Full Exam - General                     Ears/Nose/Throat                  
  oral cavity/pharynx/larynx                     Overall:                    
oropharyngeal mucosa clear                    05/15/2017                    
None                

 

 Full Exam - General                     Respiratory                    
respiratory effort/rhythm                    Overall:                    no 
retractions                    05/15/2017                    None              
  

 

 Full Exam - General                     Respiratory                    
respiratory effort/rhythm                    Overall:                    normal 
rate                    05/15/2017                    None                

 

 Full Exam - General                     Cardiovascular                    
auscultation of heart                    Overall:                    regular 
rate                    05/15/2017                    None                

 

 Full Exam - General                     Cardiovascular                    
auscultation of heart                    Overall:                    normal 
heart sounds                    05/15/2017                    None             
   

 

 Full Exam - General                     Musculoskeletal                    
spine, ribs and pelvis                    Overall:                    good 
posture                    05/15/2017                    None                

 

 Full Exam - General                     Musculoskeletal                    
gait and station                    Overall:                    normal gait    
                05/15/2017                    None                

 

 Full Exam - General                     Musculoskeletal                    
gait and station                    Overall:                    normal station 
                   05/15/2017                    None                

 

 Full Exam - General                     Musculoskeletal                    
head and neck                    Overall:                    head atraumatic   
                 05/15/2017                    None                

 

 Full Exam - General                     Neurologic                    
cranial nerves                    Overall:                    crainial nerves 2 
- 12 grossly intact                    05/15/2017                    None      
          

 

 Full Exam - General                     Psychiatric                    
orientation/consciousness                    Overall:                    
oriented to person, place and time                    05/15/2017               
     None                

 

 Full Exam - General                     Psychiatric                    
mood and affect                    Overall:                    normal mood and 
affect                    05/15/2017                    None                

 

 Full Exam - General                     Constitutional                     
general appearance                    Overall:                    well 
developed                    05/15/2017                    None                

 

 Full Exam - General                     Constitutional                     
general appearance                    Overall:                    in no acute 
distress                    05/15/2017                    None                

 

 Full Exam - General                     Constitutional                     
general appearance                    Overall:                    well 
nourished                    05/15/2017                    None                

 

 Full Exam - General                     Respiratory                    
auscultation                    Lower lung field:                    expiratory 
wheezes                    05/15/2017                    None                

 

 Full Exam - General                     Lymphatic                    neck 
nodes                    Overall:                    posterior cervical chain 
benign                    05/15/2017                    None                

 

 Full Exam - General                     Lymphatic                    neck 
nodes                    Overall:                    anterior cervical chain 
benign                    05/15/2017                    None                

 

 Full Exam - General                     Constitutional                     
general appearance                    Development:                    well 
developed                    2017                    None                

 

 Full Exam - General                     Constitutional                     
general appearance                    Development:                    appears 
stated age                    2017                    None                

 

 Full Exam - General                     Constitutional                     
general appearance                    Hygiene/Attention to Grooming:           
         good hygiene                    2017                    None    
            

 

 Full Exam - General                     Eyes                    conjunctiva
/eyelids                    Overall:                    conjunctiva clear      
              2017                    None                

 

 Full Exam - General                     Eyes                    conjunctiva
/eyelids                    Overall:                    cornea clear           
         2017                    None                

 

 Full Exam - General                     Eyes                    conjunctiva
/eyelids                    Overall:                    eyelids normal         
           2017                    None                

 

 Full Exam - General                     Eyes                    pupils and 
irises                    Overall:                    pupils equal, round, 
reactive to light and accomodation                    2017               
     None                

 

 Full Exam - General                     Ears/Nose/Throat                  
  external ear                    Overall:                    normal appearance
                    2017                    None                

 

 Full Exam - General                     Ears/Nose/Throat                  
  external nose                    Overall:                    benign 
appearance                    2017                    None                

 

 Full Exam - General                     Ears/Nose/Throat                  
  otoscopic exam                    Overall:                    external 
auditory canals clear                    2017                    None    
            

 

 Full Exam - General                     Ears/Nose/Throat                  
  otoscopic exam                    Overall:                    tympanic 
membranes clear                    2017                    None          
      

 

 Full Exam - General                     Ears/Nose/Throat                  
  hearing assessment                    Overall:                    hearing 
intact bilaterally                    2017                    None       
         

 

 Full Exam - General                     Ears/Nose/Throat                  
  lips/teeth/gingiva                    Overall:                    benign lips
                    2017                    None                

 

 Full Exam - General                     Ears/Nose/Throat                  
  lips/teeth/gingiva                    Overall:                    normal 
dentition                    2017                    None                

 

 Full Exam - General                     Ears/Nose/Throat                  
  lips/teeth/gingiva                    Overall:                    benign 
gingiva                    2017                    None                

 

 Full Exam - General                     Ears/Nose/Throat                  
  oral cavity/pharynx/larynx                     Overall:                    
oral mucosa clear                    2017                    None        
        

 

 Full Exam - General                     Ears/Nose/Throat                  
  oral cavity/pharynx/larynx                     Overall:                    
oropharyngeal mucosa clear                    2017                    
None                

 

 Full Exam - General                     Ears/Nose/Throat                  
  oral cavity/pharynx/larynx                     Overall:                    
hypopharynx benign                    2017                    None       
         

 

 Full Exam - General                     Ears/Nose/Throat                  
  oral cavity/pharynx/larynx                     Overall:                    no 
masses                    2017                    None                

 

 Full Exam - General                     Neck                    thyroid   
                 Overall:                    nontender                    2017                    None                

 

 Full Exam - General                     Neck                    thyroid   
                 Overall:                    no mass lesions                    
2017                    None                

 

 Full Exam - General                     Respiratory                    
auscultation                    Overall:                    breath sounds clear 
bilaterally                    2017                    None              
  

 

 Full Exam - General                     Respiratory                    
respiratory effort/rhythm                    Overall:                    no 
retractions                    2017                    None              
  

 

 Full Exam - General                     Respiratory                    
respiratory effort/rhythm                    Overall:                    normal 
rate                    2017                    None                

 

 Full Exam - General                     Cardiovascular                    
inspection of carotid pulses                    Overall:                    
strong, bilaterally equal, no bruits                    2017             
       None                

 

 Full Exam - General                     Cardiovascular                    
extremities                    Overall:                    no clubbing         
           2017                    None                

 

 Full Exam - General                     Cardiovascular                    
auscultation of heart                    Overall:                    regular 
rate                    2017                    None                

 

 Full Exam - General                     Cardiovascular                    
auscultation of heart                    Overall:                    normal 
heart sounds                    2017                    None             
   

 

 Full Exam - General                     Cardiovascular                    
auscultation of heart                    Overall:                    no murmurs
                    2017                    None                

 

 Full Exam - General                     Abdomen                    
abdominal exam                    Overall:                    no tenderness    
                2017                    None                

 

 Full Exam - General                     Abdomen                    
abdominal exam                    Overall:                    normal bowel 
sounds                    2017                    None                

 

 Full Exam - General                     Abdomen                    liver 
and spleen exam                    Overall:                    no 
hepatosplenomegaly                    2017                    None       
         

 

 Full Exam - General                     Lymphatic                    neck 
nodes                    Overall:                    anterior cervical chain 
benign                    2017                    None                

 

 Full Exam - General                     Lymphatic                    neck 
nodes                    Overall:                    posterior cervical chain 
benign                    2017                    None                

 

 Full Exam - General                     Musculoskeletal                    
digits and nails                    Digits:                    a normal exam   
                 2017                    None                

 

 Full Exam - General                     Musculoskeletal                    
spine, ribs and pelvis                    Overall:                    spine 
benign                    2017                    None                

 

 Full Exam - General                     Musculoskeletal                    
spine, ribs and pelvis                    Overall:                    
sacroiliac joint benign                    2017                    None  
              

 

 Full Exam - General                     Musculoskeletal                    
spine, ribs and pelvis                    Overall:                    good 
posture                    2017                    None                

 

 Full Exam - General                     Musculoskeletal                    
gait and station                    Overall:                    normal gait    
                2017                    None                

 

 Full Exam - General                     Musculoskeletal                    
gait and station                    Overall:                    normal station 
                   2017                    None                

 

 Full Exam - General                     Musculoskeletal                    
head and neck                    Overall:                    head atraumatic   
                 2017                    None                

 

 Full Exam - General                     Musculoskeletal                    
head and neck                    Overall:                    cervical spine 
benign                    2017                    None                

 

 Full Exam - General                     Neurologic                    deep 
tendon reflexes                    Overall:                    deep tendon 
reflexes intact                    2017                    None          
      

 

 Full Exam - General                     Neurologic                    
cranial nerves                    Overall:                    crainial nerves 2 
- 12 grossly intact                    2017                    None      
          

 

 Full Exam - General                     Psychiatric                    
orientation/consciousness                    Overall:                    
oriented to person, place and time                    2017               
     None                

 

 Full Exam - General                     Psychiatric                    
mood and affect                    Overall:                    normal mood and 
affect                    2017                    None                

 

 Full Exam - Orthopedics                    Constitutional                     
general appearance                    Overall:                    well 
nourished                    2017                    None                

 

 Full Exam - Orthopedics                    Constitutional                     
general appearance                    Overall:                    well 
developed                    2017                    None                

 

 Full Exam - Orthopedics                    Constitutional                     
general appearance                    Overall:                    in no acute 
distress                    2017                    None                

 

 Full Exam - Orthopedics                    Eyes                    conjunctiva/
eyelids                    Overall:                    conjunctiva clear       
             2017                    None                

 

 Full Exam - Orthopedics                    Eyes                    conjunctiva/
eyelids                    Overall:                    eyelids normal          
          2017                    None                

 

 Full Exam - Orthopedics                    Ears/Nose/Throat                    
lips/teeth/gingiva                    Overall:                    benign lips  
                  2017                    None                

 

 Full Exam - Orthopedics                    Ears/Nose/Throat                    
oral cavity/pharynx/larynx                     Overall:                    oral 
mucosa clear                    2017                    None             
   

 

 Full Exam - Orthopedics                    Respiratory                    
respiratory effort/rhythm                    Overall:                    no 
retractions                    2017                    None              
  

 

 Full Exam - Orthopedics                    Respiratory                    
respiratory effort/rhythm                    Overall:                    normal 
rate                    2017                    None                

 

 Full Exam - Orthopedics                    Psychiatric                    
orientation/consciousness                    Overall:                    
oriented to person, place and time                    2017               
     None                

 

 Full Exam - Orthopedics                    Psychiatric                    mood 
and affect                    Overall:                    normal mood and 
affect                    2017                    None                

 

 Full Exam - Orthopedics                    Psychiatric                    
appearance                    Overall:                    well-groomed, good 
eye contact                    2017                    None              
  

 

 Full Exam - Orthopedics                    MS: right upper extremity          
          insp & palp - RUE                    Wrist:                    
redness                    2017                    None                

 

 Full Exam - Orthopedics                    MS: right upper extremity          
          insp & palp - RUE                    Wrist:                    joint 
swelling                    2017                    None                

 

 Full Exam - Orthopedics                    MS: right upper extremity          
          range of motion - RUE                    Wrist:                    
pain with extension                    2017                    None      
          

 

 Full Exam - General                     Constitutional                     
general appearance                    Development:                    well 
developed                    06/15/2016                    None                

 

 Full Exam - General                     Constitutional                     
general appearance                    Development:                    appears 
stated age                    06/15/2016                    None                

 

 Full Exam - General                     Constitutional                     
general appearance                    Hygiene/Attention to Grooming:           
         good hygiene                    06/15/2016                    None    
            

 

 Full Exam - General                     Eyes                    conjunctiva
/eyelids                    Overall:                    conjunctiva clear      
              06/15/2016                    None                

 

 Full Exam - General                     Eyes                    conjunctiva
/eyelids                    Overall:                    cornea clear           
         06/15/2016                    None                

 

 Full Exam - General                     Eyes                    conjunctiva
/eyelids                    Overall:                    eyelids normal         
           06/15/2016                    None                

 

 Full Exam - General                     Eyes                    pupils and 
irises                    Overall:                    pupils equal, round, 
reactive to light and accomodation                    06/15/2016               
     None                

 

 Full Exam - General                     Ears/Nose/Throat                  
  external ear                    Overall:                    normal appearance
                    06/15/2016                    None                

 

 Full Exam - General                     Ears/Nose/Throat                  
  external nose                    Overall:                    benign 
appearance                    06/15/2016                    None                

 

 Full Exam - General                     Ears/Nose/Throat                  
  otoscopic exam                    Overall:                    external 
auditory canals clear                    06/15/2016                    None    
            

 

 Full Exam - General                     Ears/Nose/Throat                  
  otoscopic exam                    Overall:                    tympanic 
membranes clear                    06/15/2016                    None          
      

 

 Full Exam - General                     Ears/Nose/Throat                  
  hearing assessment                    Overall:                    hearing 
intact bilaterally                    06/15/2016                    None       
         

 

 Full Exam - General                     Ears/Nose/Throat                  
  lips/teeth/gingiva                    Overall:                    benign lips
                    06/15/2016                    None                

 

 Full Exam - General                     Ears/Nose/Throat                  
  lips/teeth/gingiva                    Overall:                    normal 
dentition                    06/15/2016                    None                

 

 Full Exam - General                     Ears/Nose/Throat                  
  lips/teeth/gingiva                    Overall:                    benign 
gingiva                    06/15/2016                    None                

 

 Full Exam - General                     Ears/Nose/Throat                  
  oral cavity/pharynx/larynx                     Overall:                    
oral mucosa clear                    06/15/2016                    None        
        

 

 Full Exam - General                     Ears/Nose/Throat                  
  oral cavity/pharynx/larynx                     Overall:                    
oropharyngeal mucosa clear                    06/15/2016                    
None                

 

 Full Exam - General                     Ears/Nose/Throat                  
  oral cavity/pharynx/larynx                     Overall:                    
hypopharynx benign                    06/15/2016                    None       
         

 

 Full Exam - General                     Ears/Nose/Throat                  
  oral cavity/pharynx/larynx                     Overall:                    no 
masses                    06/15/2016                    None                

 

 Full Exam - General                     Neck                    thyroid   
                 Overall:                    nontender                    06/15/
2016                    None                

 

 Full Exam - General                     Neck                    thyroid   
                 Overall:                    no mass lesions                    
06/15/2016                    None                

 

 Full Exam - General                     Respiratory                    
auscultation                    Overall:                    breath sounds clear 
bilaterally                    06/15/2016                    None              
  

 

 Full Exam - General                     Respiratory                    
respiratory effort/rhythm                    Overall:                    no 
retractions                    06/15/2016                    None              
  

 

 Full Exam - General                     Respiratory                    
respiratory effort/rhythm                    Overall:                    normal 
rate                    06/15/2016                    None                

 

 Full Exam - General                     Cardiovascular                    
inspection of carotid pulses                    Overall:                    
strong, bilaterally equal, no bruits                    06/15/2016             
       None                

 

 Full Exam - General                     Cardiovascular                    
extremities                    Overall:                    no clubbing         
           06/15/2016                    None                

 

 Full Exam - General                     Cardiovascular                    
auscultation of heart                    Overall:                    regular 
rate                    06/15/2016                    None                

 

 Full Exam - General                     Cardiovascular                    
auscultation of heart                    Overall:                    normal 
heart sounds                    06/15/2016                    None             
   

 

 Full Exam - General                     Cardiovascular                    
auscultation of heart                    Overall:                    no murmurs
                    06/15/2016                    None                

 

 Full Exam - General                     Abdomen                    
abdominal exam                    Overall:                    no tenderness    
                06/15/2016                    None                

 

 Full Exam - General                     Abdomen                    
abdominal exam                    Overall:                    normal bowel 
sounds                    06/15/2016                    None                

 

 Full Exam - General                     Abdomen                    liver 
and spleen exam                    Overall:                    no 
hepatosplenomegaly                    06/15/2016                    None       
         

 

 Full Exam - General                     Lymphatic                    neck 
nodes                    Overall:                    anterior cervical chain 
benign                    06/15/2016                    None                

 

 Full Exam - General                     Lymphatic                    neck 
nodes                    Overall:                    posterior cervical chain 
benign                    06/15/2016                    None                

 

 Full Exam - General                     Musculoskeletal                    
digits and nails                    Digits:                    a normal exam   
                 06/15/2016                    None                

 

 Full Exam - General                     Musculoskeletal                    
spine, ribs and pelvis                    Overall:                    spine 
benign                    06/15/2016                    None                

 

 Full Exam - General                     Musculoskeletal                    
spine, ribs and pelvis                    Overall:                    
sacroiliac joint benign                    06/15/2016                    None  
              

 

 Full Exam - General                     Musculoskeletal                    
spine, ribs and pelvis                    Overall:                    good 
posture                    06/15/2016                    None                

 

 Full Exam - General                     Musculoskeletal                    
gait and station                    Overall:                    normal gait    
                06/15/2016                    None                

 

 Full Exam - General                     Musculoskeletal                    
gait and station                    Overall:                    normal station 
                   06/15/2016                    None                

 

 Full Exam - General                     Musculoskeletal                    
head and neck                    Overall:                    head atraumatic   
                 06/15/2016                    None                

 

 Full Exam - General                     Musculoskeletal                    
head and neck                    Overall:                    cervical spine 
benign                    06/15/2016                    None                

 

 Full Exam - General                     Integument                    
inspection of skin                    Overall:                    few scattered 
moles, no gross abnormalities                    06/15/2016                    
dermatofibroma of leg, darkening of skin on lower back.                

 

 Full Exam - General                     Neurologic                    deep 
tendon reflexes                    Overall:                    deep tendon 
reflexes intact                    06/15/2016                    None          
      

 

 Full Exam - General                     Neurologic                    
cranial nerves                    Overall:                    crainial nerves 2 
- 12 grossly intact                    06/15/2016                    None      
          

 

 Full Exam - General                     Psychiatric                    
orientation/consciousness                    Overall:                    
oriented to person, place and time                    06/15/2016               
     None                

 

 Full Exam - General                     Psychiatric                    
mood and affect                    Overall:                    normal mood and 
affect                    06/15/2016                    None                

 

 Full Exam - General                     Constitutional                     
general appearance                    Development:                    well 
developed                    2015                    None                

 

 Full Exam - General                     Constitutional                     
general appearance                    Development:                    appears 
stated age                    2015                    None                

 

 Full Exam - General                     Constitutional                     
general appearance                    Hygiene/Attention to Grooming:           
         good hygiene                    2015                    None    
            

 

 Full Exam - General                     Eyes                    conjunctiva
/eyelids                    Overall:                    conjunctiva clear      
              2015                    None                

 

 Full Exam - General                     Eyes                    conjunctiva
/eyelids                    Overall:                    cornea clear           
         2015                    None                

 

 Full Exam - General                     Eyes                    conjunctiva
/eyelids                    Overall:                    eyelids normal         
           2015                    None                

 

 Full Exam - General                     Eyes                    pupils and 
irises                    Overall:                    pupils equal, round, 
reactive to light and accomodation                    2015               
     None                

 

 Full Exam - General                     Ears/Nose/Throat                  
  otoscopic exam                    Overall:                    external 
auditory canals clear                    2015                    None    
            

 

 Full Exam - General                     Ears/Nose/Throat                  
  otoscopic exam                    Overall:                    tympanic 
membranes clear                    2015                    None          
      

 

 Full Exam - General                     Ears/Nose/Throat                  
  lips/teeth/gingiva                    Overall:                    benign lips
                    2015                    None                

 

 Full Exam - General                     Ears/Nose/Throat                  
  lips/teeth/gingiva                    Overall:                    normal 
dentition                    2015                    None                

 

 Full Exam - General                     Ears/Nose/Throat                  
  oral cavity/pharynx/larynx                     Overall:                    
oral mucosa clear                    2015                    None        
        

 

 Full Exam - General                     Ears/Nose/Throat                  
  oral cavity/pharynx/larynx                     Overall:                    
oropharyngeal mucosa clear                    2015                    
None                

 

 Full Exam - General                     Ears/Nose/Throat                  
  oral cavity/pharynx/larynx                     Overall:                    
hypopharynx benign                    2015                    None       
         

 

 Full Exam - General                     Ears/Nose/Throat                  
  oral cavity/pharynx/larynx                     Overall:                    no 
masses                    2015                    None                

 

 Full Exam - General                     Respiratory                    
auscultation                    Overall:                    breath sounds clear 
bilaterally                    2015                    None              
  

 

 Full Exam - General                     Respiratory                    
respiratory effort/rhythm                    Overall:                    no 
retractions                    2015                    None              
  

 

 Full Exam - General                     Respiratory                    
respiratory effort/rhythm                    Overall:                    normal 
rate                    2015                    None                

 

 Full Exam - General                     Cardiovascular                    
extremities                    Overall:                    no clubbing         
           2015                    None                

 

 Full Exam - General                     Cardiovascular                    
auscultation of heart                    Overall:                    regular 
rate                    2015                    None                

 

 Full Exam - General                     Cardiovascular                    
auscultation of heart                    Overall:                    normal 
heart sounds                    2015                    None             
   

 

 Full Exam - General                     Abdomen                    
abdominal exam                    Overall:                    no tenderness    
                2015                    None                

 

 Full Exam - General                     Abdomen                    
abdominal exam                    Overall:                    normal bowel 
sounds                    2015                    None                

 

 Full Exam - General                     Lymphatic                    neck 
nodes                    Overall:                    anterior cervical chain 
benign                    2015                    None                

 

 Full Exam - General                     Lymphatic                    neck 
nodes                    Overall:                    posterior cervical chain 
benign                    2015                    None                

 

 Full Exam - General                     Musculoskeletal                    
spine, ribs and pelvis                    Overall:                    spine 
benign                    2015                    None                

 

 Full Exam - General                     Musculoskeletal                    
spine, ribs and pelvis                    Overall:                    
sacroiliac joint benign                    2015                    None  
              

 

 Full Exam - General                     Musculoskeletal                    
spine, ribs and pelvis                    Overall:                    good 
posture                    2015                    None                

 

 Full Exam - General                     Musculoskeletal                    
head and neck                    Overall:                    head atraumatic   
                 2015                    None                

 

 Full Exam - General                     Musculoskeletal                    
head and neck                    Overall:                    cervical spine 
benign                    2015                    None                

 

 Full Exam - General                     Neurologic                    deep 
tendon reflexes                    Overall:                    deep tendon 
reflexes intact                    2015                    None          
      

 

 Full Exam - General                     Neurologic                    
cranial nerves                    Overall:                    crainial nerves 2 
- 12 grossly intact                    2015                    None      
          

 

 Full Exam - General                     Psychiatric                    
orientation/consciousness                    Overall:                    
oriented to person, place and time                    2015               
     None                

 

 Full Exam - General                     Psychiatric                    
mood and affect                    Overall:                    normal mood and 
affect                    2015                    None                

 

 Full Exam - General                     Ears/Nose/Throat                  
  external ear                    Overall:                    normal appearance
                    2015                    None                

 

 Full Exam - General                     Ears/Nose/Throat                  
  external nose                    Overall:                    benign 
appearance                    2015                    None                

 

 Full Exam - General                     Ears/Nose/Throat                  
  hearing assessment                    Overall:                    hearing 
intact bilaterally                    2015                    None       
         

 

 Full Exam - General                     Ears/Nose/Throat                  
  lips/teeth/gingiva                    Overall:                    benign 
gingiva                    2015                    None                

 

 Full Exam - General                     Neck                    thyroid   
                 Overall:                    no mass lesions                    
2015                    None                

 

 Full Exam - General                     Neck                    thyroid   
                 Overall:                    nontender                    2015                    None                

 

 Full Exam - General                     Cardiovascular                    
inspection of carotid pulses                    Overall:                    
strong, bilaterally equal, no bruits                    2015             
       None                

 

 Full Exam - General                     Cardiovascular                    
auscultation of heart                    Overall:                    no murmurs
                    2015                    None                

 

 Full Exam - General                     Abdomen                    liver 
and spleen exam                    Overall:                    no 
hepatosplenomegaly                    2015                    None       
         

 

 Full Exam - General                     Musculoskeletal                    
digits and nails                    Digits:                    a normal exam   
                 2015                    None                

 

 Full Exam - General                     Musculoskeletal                    
gait and station                    Overall:                    normal gait    
                2015                    None                

 

 Full Exam - General                     Musculoskeletal                    
gait and station                    Overall:                    normal station 
                   2015                    None                

 

 Full Exam - General                     Integument                    
inspection of skin                    Overall:                    few scattered 
moles, no gross abnormalities                    2015                    
dermatofibroma of leg, darkening of skin on lower back.                

 

 Full Exam - General                     Constitutional                     
general appearance                    Overall:                    well 
nourished                    2013                    None                

 

 Full Exam - General                     Constitutional                     
general appearance                    Overall:                    well 
developed                    2013                    None                

 

 Full Exam - General                     Constitutional                     
general appearance                    Overall:                    in no acute 
distress                    2013                    None                

 

 Full Exam - General                     Psychiatric                    
orientation/consciousness                    Overall:                    
oriented to person, place and time                    2013               
     None                

 

 Full Exam - General                     Psychiatric                    
mood and affect                    Mood:                    happy              
      2013                    None                

 

 Full Exam - General                     Psychiatric                    
mood and affect                    Overall:                    normal mood and 
affect                    2013                    None                

 

 Full Exam - General                     Musculoskeletal                    
gait and station                    Overall:                    normal gait    
                2013                    None                

 

 Full Exam - General                     Musculoskeletal                    
gait and station                    Overall:                    normal station 
                   2013                    None                

 

 Full Exam - General                     Cardiovascular                    
auscultation of heart                    Overall:                    regular 
rate                    2013                    None                

 

 Full Exam - General                     Cardiovascular                    
auscultation of heart                    Overall:                    normal 
heart sounds                    2013                    None             
   

 

 Full Exam - General                     Cardiovascular                    
auscultation of heart                    Overall:                    no murmurs
                    2013                    None                

 

 Full Exam - General                     Respiratory                    
respiratory effort/rhythm                    Overall:                    normal 
rate                    2013                    None                

 

 Full Exam - General                     Respiratory                    
respiratory effort/rhythm                    Overall:                    no 
retractions                    2013                    None              
  

 

 Full Exam - General                     Respiratory                    
auscultation                    Overall:                    breath sounds clear 
bilaterally                    2013                    None              
  

 

 Full Exam - General                     Musculoskeletal                    
lower extremity                    Inspection -  ankle:                    a 
normal exam                    2013                    None              
  

 

 Full Exam - General                     Musculoskeletal                    
lower extremity                    Palpation -  ankle:                    a 
normal exam                    2013                    None              
  

 

 Full Exam - General                     Musculoskeletal                    
lower extremity                    Palpation -  ankle:                    
tender @ achilles tendon                    2013                    None 
               

 

 Full Exam - General                     Musculoskeletal                    
lower extremity                    Palpation -  ankle:                    
tender @ plantar fascia insertion                    2013                
    None                

 

 Full Exam - General 1995                    Ears/Nose/Throat                  
  oral cavity/pharynx/larynx                     Overall:                    
oropharyngeal mucosa clear                    2013                    
None                

 

 Full Exam - General 1995                    Ears/Nose/Throat                  
  oral cavity/pharynx/larynx                     Overall:                    no 
masses                    2013                    None                

 

 Full Exam - General 1995                    Ears/Nose/Throat                  
  oral cavity/pharynx/larynx                     Overall:                    
oral mucosa clear                    2013                    None        
        

 

 Full Exam - General 1995                    Ears/Nose/Throat                  
  otoscopic exam                    Overall:                    tympanic 
membranes clear                    2013                    None          
      

 

 Full Exam - General 1995                    Ears/Nose/Throat                  
  otoscopic exam                    Overall:                    external 
auditory canals clear                    2013                    None    
            

 

 Full Exam - General                     Eyes                    pupils and 
irises                    Overall:                    pupils equal, round, 
reactive to light and accomodation                    2013               
     None                

 

 Full Exam - General                     Constitutional                     
general appearance                    Overall:                    well 
developed                    2013                    None                

 

 Full Exam - General                     Constitutional                     
general appearance                    Overall:                    in no acute 
distress                    2013                    None                

 

 Full Exam - General                     Constitutional                     
general appearance                    Overall:                    well 
nourished                    2013                    None                

 

 Full Exam - General                     Respiratory                    
auscultation                    Overall:                    breath sounds clear 
bilaterally                    2013                    None              
  

 

 Full Exam - General                     Respiratory                    
respiratory effort/rhythm                    Overall:                    no 
retractions                    2013                    None              
  

 

 Full Exam - General                     Respiratory                    
respiratory effort/rhythm                    Overall:                    normal 
rate                    2013                    None                

 

 Full Exam - General                     Cardiovascular                    
auscultation of heart                    Overall:                    regular 
rate                    2013                    None                

 

 Full Exam - General                     Cardiovascular                    
auscultation of heart                    Overall:                    normal 
heart sounds                    2013                    None             
   

 

 Full Exam - General                     Cardiovascular                    
auscultation of heart                    Overall:                    no murmurs
                    2013                    None                

 

 Full Exam - General                     Integument                    
inspection of skin                    Dermatitis:                    erythema  
                  2013                    None                

 

 Full Exam - General                     Integument                    
inspection of skin                    Dermatitis:                    dryness/
flaking                    2013                    None                

 

 Full Exam - General                     Integument                    
inspection of skin                    Dermatitis:                    scaling   
                 2013                    None                

 

 Full Exam - General                     Integument                    
inspection of skin                    Dermatitis:                    thickened 
                   2013                    None                

 

 Full Exam - General                     Integument                    
inspection of skin                    Dermatitis:                    
lichenification                    2013                    None          
      

 

 Full Exam - General                     Integument                    
inspection of skin                    Location:                    right arm   
                 2013                    None                

 

 Full Exam - General                     Neurologic                    gait
                    Overall:                    no ataxia, no unsteadiness     
               2013                    None                

 

 Full Exam - General                     Psychiatric                    
orientation/consciousness                    Overall:                    
oriented to person, place and time                    2013               
     None                

 

 Full Exam - General                     Psychiatric                    
mood and affect                    Overall:                    normal mood and 
affect                    2013                    None                

 

 Full Exam - General                     Psychiatric                    
mood and affect                    Mood:                    happy              
      2013                    None                

 

 Full Exam - General                     Integument                    
inspection of skin                    Location:                    left hand   
                 2013                    None                

 

 Full Exam - General                     Integument                    
inspection of skin                    Location:                    right hand  
                  2013                    None                

 

 Full Exam - General                     Constitutional                     
general appearance                    Overall:                    well 
developed                    2012                    None                

 

 Full Exam - General                     Constitutional                     
general appearance                    Overall:                    in no acute 
distress                    2012                    None                

 

 Full Exam - General                     Constitutional                     
general appearance                    Overall:                    well 
nourished                    2012                    None                

 

 Full Exam - General                     Respiratory                    
auscultation                    Overall:                    breath sounds clear 
bilaterally                    2012                    None              
  

 

 Full Exam - General                     Respiratory                    
respiratory effort/rhythm                    Overall:                    no 
retractions                    2012                    None              
  

 

 Full Exam - General                     Respiratory                    
respiratory effort/rhythm                    Overall:                    normal 
rate                    2012                    None                

 

 Full Exam - General                     Cardiovascular                    
auscultation of heart                    Overall:                    regular 
rate                    2012                    None                

 

 Full Exam - General                     Cardiovascular                    
auscultation of heart                    Overall:                    normal 
heart sounds                    2012                    None             
   

 

 Full Exam - General                     Cardiovascular                    
auscultation of heart                    Overall:                    no murmurs
                    2012                    None                

 

 Full Exam - General                     Integument                    
inspection of skin                    Dermatitis:                    erythema  
                  2012                    None                

 

 Full Exam - General                     Integument                    
inspection of skin                    Dermatitis:                    dryness/
flaking                    2012                    None                

 

 Full Exam - General                     Integument                    
inspection of skin                    Dermatitis:                    scaling   
                 2012                    None                

 

 Full Exam - General                     Integument                    
inspection of skin                    Dermatitis:                    thickened 
                   2012                    None                

 

 Full Exam - General                     Integument                    
inspection of skin                    Dermatitis:                    
lichenification                    2012                    None          
      

 

 Full Exam - General                     Integument                    
inspection of skin                    Dermatitis:                    
excoriation                    2012                    over dorsum of the 
right hand                

 

 Full Exam - General                     Neurologic                    gait
                    Overall:                    no ataxia, no unsteadiness     
               2012                    None                

 

 Full Exam - General                     Psychiatric                    
orientation/consciousness                    Overall:                    
oriented to person, place and time                    2012               
     None                

 

 Full Exam - General                     Psychiatric                    
mood and affect                    Overall:                    normal mood and 
affect                    2012                    None                

 

 Full Exam - General                     Psychiatric                    
mood and affect                    Mood:                    happy              
      2012                    None                

 

 Full Exam - General 1995                    Ears/Nose/Throat                  
  otoscopic exam                    Overall:                    external 
auditory canals clear                    2012                    None    
            

 

 Full Exam - General 1995                    Ears/Nose/Throat                  
  otoscopic exam                    Overall:                    tympanic 
membranes clear                    2012                    None          
      

 

 Full Exam - General                     Ears/Nose/Throat                  
  oral cavity/pharynx/larynx                     Overall:                    
oral mucosa clear                    2012                    None        
        

 

 Full Exam - General                     Integument                    
inspection of skin                    Dermatitis:                    scaling   
                 2012                    None                

 

 Full Exam - General                     Integument                    
inspection of skin                    Dermatitis:                    thickened 
                   2012                    None                

 

 Full Exam - General                     Integument                    
inspection of skin                    Dermatitis:                    
lichenification                    2012                    None          
      

 

 Full Exam - General                     Neurologic                    gait
                    Overall:                    no ataxia, no unsteadiness     
               2012                    None                

 

 Full Exam - General                     Psychiatric                    
orientation/consciousness                    Overall:                    
oriented to person, place and time                    2012               
     None                

 

 Full Exam - General                     Psychiatric                    
mood and affect                    Overall:                    normal mood and 
affect                    2012                    None                

 

 Full Exam - General                     Psychiatric                    
mood and affect                    Mood:                    happy              
      2012                    None                

 

 Full Exam - General                     Integument                    
inspection of skin                    Location:                    right arm   
                 2012                    None                

 

 Full Exam - General                     Integument                    
inspection of skin                    Location:                    left arm    
                2012                    None                

 

 Full Exam - General                     Constitutional                     
general appearance                    Overall:                    well 
developed                    2012                    None                

 

 Full Exam - General                     Constitutional                     
general appearance                    Overall:                    in no acute 
distress                    2012                    None                

 

 Full Exam - General                     Constitutional                     
general appearance                    Overall:                    well 
nourished                    2012                    None                

 

 Full Exam - General                     Respiratory                    
auscultation                    Overall:                    breath sounds clear 
bilaterally                    2012                    None              
  

 

 Full Exam - General                     Respiratory                    
respiratory effort/rhythm                    Overall:                    no 
retractions                    2012                    None              
  

 

 Full Exam - General                     Respiratory                    
respiratory effort/rhythm                    Overall:                    normal 
rate                    2012                    None                

 

 Full Exam - General                     Cardiovascular                    
auscultation of heart                    Overall:                    regular 
rate                    2012                    None                

 

 Full Exam - General                     Cardiovascular                    
auscultation of heart                    Overall:                    normal 
heart sounds                    2012                    None             
   

 

 Full Exam - General                     Cardiovascular                    
auscultation of heart                    Overall:                    no murmurs
                    2012                    None                

 

 Full Exam - General                     Integument                    
inspection of skin                    Dermatitis:                    erythema  
                  2012                    None                

 

 Full Exam - General 1995                    Integument                    
inspection of skin                    Dermatitis:                    dryness/
flaking                    2012                    None                

 

 Full Exam - General                     Psychiatric                    
orientation/consciousness                    Overall:                    
oriented to person, place and time                    2012               
     None                

 

 Full Exam - General                     Psychiatric                    
mood and affect                    Mood:                    happy              
      2012                    None                

 

 Full Exam - General                     Psychiatric                    
mood and affect                    Overall:                    normal mood and 
affect                    2012                    None                

 

 Full Exam - General                     Neurologic                    gait
                    Overall:                    no ataxia, no unsteadiness     
               2012                    None                

 

 Full Exam - General 1995                    Integument                    
inspection of skin                    Dermatitis:                    erythema  
                  2012                    None                

 

 Full Exam - General                     Integument                    
inspection of skin                    Dermatitis:                    dryness/
flaking                    2012                    None                

 

 Full Exam - General                     Integument                    
inspection of skin                    Dermatitis:                    scaling   
                 2012                    None                

 

 Full Exam - General                     Integument                    
inspection of skin                    Dermatitis:                    thickened 
                   2012                    None                

 

 Full Exam - General                     Integument                    
inspection of skin                    Dermatitis:                    
lichenification                    2012                    None          
      

 

 Full Exam - General                     Integument                    
inspection of skin                    Dermatitis:                    
excoriation                    2012                    over dorsum of the 
right hand and over the right mid abdomen                

 

 Full Exam - General                     Integument                    
palpation                    Hand:                    tender                    
2012                    None                

 

 Full Exam - General                     Cardiovascular                    
auscultation of heart                    Overall:                    regular 
rate                    2012                    None                

 

 Full Exam - General                     Cardiovascular                    
auscultation of heart                    Overall:                    normal 
heart sounds                    2012                    None             
   

 

 Full Exam - General                     Cardiovascular                    
auscultation of heart                    Overall:                    no murmurs
                    2012                    None                

 

 Full Exam - General                     Respiratory                    
auscultation                    Overall:                    breath sounds clear 
bilaterally                    2012                    None              
  

 

 Full Exam - General                     Respiratory                    
respiratory effort/rhythm                    Overall:                    normal 
rate                    2012                    None                

 

 Full Exam - General                     Respiratory                    
respiratory effort/rhythm                    Overall:                    no 
retractions                    2012                    None              
  

 

 Full Exam - General                     Constitutional                     
general appearance                    Overall:                    well 
nourished                    2012                    None                

 

 Full Exam - General                     Constitutional                     
general appearance                    Overall:                    well 
developed                    2012                    None                

 

 Full Exam - General                     Constitutional                     
general appearance                    Overall:                    in no acute 
distress                    2012                    None                



                                                                              



Procedures

                      





 Procedure                    Codes                    Date                

 

                     THER/PROPH/DIAG INJ SC/IM                                 
     CPT-4: 52908                    2018                

 

                     TRIAMCINOLONE ACET INJ NOS                                
      CPT-4:                     2018                

 

                     TRIAMCINOLONE ACET INJ NOS                                
      CPT-4:                     2017                

 

                     TRIAMCINOLONE ACET INJ NOS                                
      CPT-4:                     05/15/2017                

 

                     ROCEPHIN,  MG                                      
CPT-4:                     2012                

 

                     KETOROLAC TROMETHAMINE INJ                                
      CPT-4:                     2012                

 

                     THER/PROPH/DIAG INJ SC/IM                                 
     CPT-4: 34425                    2012                

 

                     TRIAMCINOLONE ACET INJ NOS                                
      CPT-4:                     2012                

 

                     THER/PROPH/DIAG INJ SC/IM                                 
     CPT-4: 11577                    2012                



                                                                               
                                                                               



Vital Signs

                      





 Date                    Vital                









 03/15/2018                                        Blood Pressure 1: 124/84 Code
: 8480-6                                                          BMI: 26.3 Code
: 86915-8                                                          Heart Rate 1
: 85 bpm                                                          Height: 6'2" 
                                                         SpO2: 98%             
                                             Weight: 208 lbs                   
                

 

 2018                                        Blood Pressure 1: 138/86 Code
: 8480-6                                                          BMI: 26.1 Code
: 01533-2                                                          Heart Rate 1
: 74 bpm                                                          Height: 6'2" 
                                                         SpO2: 99%             
                                             Weight: 206 lbs                   
                

 

 2018                                        Blood Pressure 1: 122/80 Code
: 8480-6                                                          BMI: 26.6 Code
: 75283-7                                                          Heart Rate 1
: 76 bpm                                                          Height: 6'2" 
                                                         SpO2: 96%             
                                             Weight: 210 lbs                   
                

 

 2018                                        Blood Pressure 1: 130/70 Code
: 8480-6                                                          BMI: 26.9 Code
: 92101-1                                                          Heart Rate 1
: 83 bpm                                                          Height: 6'2" 
                                                         SpO2: 98%             
                                             Weight: 212 lbs                   
                

 

 2017                                        Blood Pressure 1: 122/74 Code
: 8480-6                                                          BMI: 28.1 Code
: 99563-6                                                          Heart Rate 1
: 88 bpm                                                          Height: 6'2" 
                                                         SpO2: 99%             
                                             Weight: 222 lbs                   
                









 05/15/2017                                        Blood Pressure 1: 128/80 Code
: 8480-6                                                          BMI: 29.4 Code
: 16914-3                                                          Heart Rate 1
: 92 bpm                                                          Height: 6'2" 
                                                         SpO2: 98%             
                                             Temperature: 36.6 (C) / 97.8 (F)  
                                                        Weight: 232 lbs        
                           









 2017                                        Blood Pressure 1: 124/78 Code
: 8480-6                                                          BMI: 29.4 Code
: 27161-8                                                          Heart Rate 1
: 78 bpm                                                          Height: 6'2" 
                                                         SpO2: 96%             
                                             Weight: 232 lbs                   
                

 

 2017                                        Blood Pressure 1: 128/82 Code
: 8480-6                                                          BMI: 31.0 Code
: 06190-4                                                          Heart Rate 1
: 80 bpm                                                          Height: 6'2" 
                                                         SpO2: 98%             
                                             Weight: 245 lbs                   
                

 

 06/15/2016                                        Blood Pressure 1: 126/74 Code
: 8480-6                                                          BMI: 29.9 Code
: 90782-1                                                          Heart Rate 1
: 71 bpm                                                          Height: 6'2" 
                                                         SpO2: 98%             
                                             Weight: 236 lbs                   
                









 2015                                        Blood Pressure 1: 102/74 Code
: 8480-6                                                          BMI: 30.4 Code
: 32173-9                                                          Heart Rate 1
: 76 bpm                                                          Height: 6'2" 
                                                         Weight: 240 lbs       
                            









 2013                                        Blood Pressure 1: 122/82 Code
: 8480-6                                                          BMI: 32.8 Code
: 01333-4                                                          Heart Rate 1
: 72 bpm                                                          Height: 6'   
                                                       Respiratory Rate: 16 bpm
                                                          Weight: 245 lbs      
                             









 2013                                        Blood Pressure 1: 126/74 Code
: 8480-6                                                          BMI: 31.6 Code
: 52250-2                                                          Heart Rate 1
: 80 bpm                                                          Height: 6'2" 
                                                         Weight: 246 lbs       
                            

 

 2012                                        Blood Pressure 1: 126/74 Code
: 8480-6                                                          Heart Rate 1: 
72 bpm                                                          Respiratory Rate
: 16 bpm                                                          Temperature: 
36.8 (C) / 98.3 (F)                                                          
Weight: 240 lbs                                   

 

 2012                                        Blood Pressure 1: 130/80 Code
: 8480-6                                                          BMI: 30.1 Code
: 02504-7                                                          Heart Rate 1
: 62 bpm                                                          Height: 6'2" 
                                                         Weight: 237 lbs 8 oz  
                                









 2012                                        Blood Pressure 1: 140/72 Code
: 8480-6                                                          BMI: 32.1 Code
: 13998-5                                                          Heart Rate 1
: 68 bpm                                                          Height: 6'   
                                                       Respiratory Rate: 16 bpm
                                                          Weight: 237 lbs      
                             



                                                                               
                                                                               
                                                                      



Functional Status

          No Functional Status data                                            
              



History of Present Illness

                      





 Symptom Name                    Status                    Result              
      Effective Date                    Notes                

 

 Hospital Follow Up                    _                    Other: persistent 
nausea                    03/15/2018                    None                

 

 Hospital Follow Up                    Quality                    acute        
            03/15/2018                    None                

 

 Hospital Follow Up                    Quality                    improving    
                03/15/2018                    None                

 

 Hospital Follow Up                    _                    gastrointestinal 
complaints                    03/15/2018                    None                

 

 Hospital Follow Up                    Onset of Symptom                    8 
weeks ago                    03/15/2018                    None                

 

 Hospital Follow Up                    Onset and Resolution                    
resolved                    03/15/2018                    None                

 

 Hospital Follow Up                    Severity                    severe      
              03/15/2018                    None                

 

 Hospital Follow Up                    Alleviating Factors                    
activity                    03/15/2018                    stopping all 
medications                 

 

 Hospital Follow Up                    Exacerbating Factors                    
medication                    03/15/2018                    RA meds-[place]aqu[place]il  
               

 

 Hospital Follow Up                    Unchanged by these Factors              
      eating                    03/15/2018                    None             
   

 

 weight loss                    Quality                    worsening           
         2018                    None                

 

 weight loss                    Onset and Resolution                    gradual 
in onset                    2018                    None                

 

 weight loss                    Onset and Resolution                    ongoing
                    2018                    None                

 

 weight loss                    Onset of Symptom                     months ago
                    2018                    None                

 

 weight loss                    Weight Status                    has lost 
greater than ten pounds total                    2018                    
None                

 

 weight loss                    Diet                    decreased intake       
             2018                    None                

 

 weight loss                    Triggers                    change in diet     
               2018                    None                

 

 weight loss                    Triggers                    unintentional 
weight loss                    2018                    None              
  

 

 weight loss                    Pertinent Findings                    nausea   
                 2018                    None                

 

 weight loss                    Pertinent Findings                    vomiting 
                   2018                    None                

 

 weight loss                    Pertinent Findings                    depressed 
mood                    2018                    None                

 

 depression                    Quality                    acute                
    2018                    None                

 

 depression                    Onset and Resolution                    ongoing 
                   2018                    None                

 

 depression                    Triggers                    stress              
      2018                    None                

 

 depression                    Pertinent Findings                    depressed 
mood                    2018                    None                

 

 depression                    Pertinent Findings                    lethargy  
                  2018                    None                

 

 depression                    Pertinent Findings                    feeding 
difficulties                    2018                    None             
   

 

 nausea                    Onset and Resolution                    ongoing     
               2018                    None                

 

 nausea                    Onset of Symptom                     months ago     
               2018                    None                

 

 nausea                    Quality                    worsening                
    2018                    None                

 

 nausea                    Quality                    constant                 
   2018                    None                

 

 nausea                    Pertinent Findings                    weight loss   
                 2018                    None                

 

 nausea                    Pertinent Findings                    lethargy      
              2018                    None                

 

 nausea                    Triggers                    no known associated 
factors                    2018                    None                

 

 nausea                    Frequency of Episodes                    daily      
              2018                    None                

 

 nausea                    Frequency of Episodes                    unchanged  
                  2018                    None                

 

 weight loss                    Onset and Resolution                    ongoing
                    2018                    None                

 

 dizziness                    Quality                    constant              
      2018                    None                

 

 dizziness                    Quality                    acute                 
   2018                    None                

 

 dizziness                    Onset and Resolution                    sudden in 
onset                    2018                    None                

 

 dizziness                    Onset of Symptom                    5 days ago   
                 2018                    None                

 

 dizziness                    Frequency of Episodes                    daily   
                 2018                    None                

 

 dizziness                    Triggers                    no known associated 
factors                    2018                    None                

 

 dizziness                    Pertinent Findings                    nausea     
               2018                    None                

 

 wrist pain                    Location                    on the left         
           2017                    None                

 

 wrist pain                    Quality                    constant             
       2017                    None                

 

 wrist pain                    Onset and Resolution                    sudden 
in onset                    2017                    None                

 

 wrist pain                    Onset of Symptom                    24 hours ago
                    2017                    None                

 

 cough                    Location                    in the lung              
      05/15/2017                    None                

 

 cough                    Quality                    constant                  
  05/15/2017                    None                

 

 cough                    Quality                    hacking                    
05/15/2017                    None                

 

 cough                    Quality                    productive                
    05/15/2017                    None                

 

 cough                    Onset and Resolution                    sudden in 
onset                    05/15/2017                    None                

 

 cough                    Onset of Symptom                    2 weeks ago      
              05/15/2017                    None                

 

 cough                    Frequency of Episodes                    daily       
             05/15/2017                    None                

 

 cough                    Pertinent Findings                    Denies chest 
discomfort                    05/15/2017                    None                

 

 cough                    Pertinent Findings                    hoarseness     
               05/15/2017                    None                

 

 cough                    Pertinent Findings                    nasal 
congestion                    05/15/2017                    None                

 

 cough                    Pertinent Findings                    sputum 
production                    05/15/2017                    None                

 

 sinus congestion                    Location                    frontal 
sinuses                    05/15/2017                    None                

 

 sinus congestion                    Quality                    pressure       
             05/15/2017                    None                

 

 sinus congestion                    Onset and Resolution                    
sudden in onset                    05/15/2017                    None          
      

 

 sinus congestion                    Onset of Symptom                    2 
weeks ago                    05/15/2017                    None                

 

 sinus congestion                    Frequency of Episodes                    
daily                    05/15/2017                    None                

 

 sinus congestion                    Pertinent Findings                    
cough                    05/15/2017                    None                

 

 sinus congestion                    Pertinent Findings                    
hoarseness                    05/15/2017                    None                

 

 dizziness                    Quality                    acute                 
   2017                    None                

 

 dizziness                    Onset and Resolution                    sudden in 
onset                    2017                    None                

 

 dizziness                    Limitation on Activities                    
moderately limits activities                    2017                    
None                

 

 dizziness                    Triggers                    head turning         
           2017                    None                

 

 dizziness                    Pertinent Findings                    blurred 
vision                    2017                    None                

 

 dizziness                    Pertinent Findings                    
lightheadedness                    2017                    None          
      

 

 dizziness                    Exacerbating Factors                    turning 
the head                    2017                    None                

 

 dizziness                    Exacerbating Factors                    
medication                    2017                    meloxicam          
      

 

 dizziness                    Onset of Symptom                    5 days ago   
                 2017                    None                

 

 dizziness                    Frequency of Episodes                    
decreasing                    2017                    None                

 

 wrist pain                    Location                    on the right        
            2017                    None                

 

 wrist pain                    Quality                    acute                
    2017                    None                

 

 wrist pain                    Limitation on Activities                    
moderately limits activities                    2017                    
None                

 

 wrist pain                    Pertinent Findings                    Denies 
numbness                    2017                    None                

 

 wrist pain                    Pertinent Findings                    redness   
                 2017                    None                

 

 wrist pain                    Pertinent Findings                    stiffness 
                   2017                    None                

 

 wrist pain                    Pertinent Findings                    swelling  
                  2017                    None                

 

 ankle pain                    Location                    on the right        
            06/15/2016                    None                

 

 ankle pain                    Quality                    dull pain            
        06/15/2016                    None                

 

 ankle pain                    Quality                    aching               
     06/15/2016                    None                

 

 ankle pain                    Location                    achilles tendon     
               06/15/2016                    None                

 

 ankle pain                    Onset and Resolution                    sudden 
in onset                    06/15/2016                    None                

 

 ankle pain                    Onset of Symptom                    2 weeks ago 
                   06/15/2016                    None                

 

 ankle pain                    Frequency of Episodes                    daily  
                  06/15/2016                    None                

 

 ankle pain                    Sports Participation                    
basketball                    06/15/2016                    None                

 

 ankle pain                    Sports Participation                    baseball
                    06/15/2016                    None                

 

 ankle pain                    Sports Participation                    running 
                   06/15/2016                    None                

 

 ankle pain                    Pertinent Findings                    stiffness 
                   06/15/2016                    None                

 

 ankle pain                    Pertinent Findings                    pain with 
movement                    06/15/2016                    None                

 

 well man exam (18-39 years)                    Birth Control                  
  male condom                    06/15/2016                    None            
    

 

 well man exam (18-39 years)                    Nutrition and Exercise         
           normal weight                    06/15/2016                    None 
               

 

 well man exam (18-39 years)                    Nutrition and Exercise         
           balanced nutrition                    06/15/2016                    
None                

 

 well man exam (18-39 years)                    Nutrition and Exercise         
           regular diet                    06/15/2016                    None  
              

 

 well man exam (18-39 years)                    Nutrition and Exercise         
           moderate exercise                    06/15/2016                    
None                

 

 skin lesion                    Onset of Symptom                    _ years ago
                    2015                    None                

 

 skin lesion                    Quality                    flat                
    2015                    Denies pain.                  

 

 well man exam (18-39 years)                    Sexual Activity                
    experiences sexual satisfaction                    2015              
      None                

 

 well man exam (18-39 years)                    Sexual Activity                
    is monogamous                    2015                    None        
        

 

 well man exam (18-39 years)                    Birth Control                  
  regular use                    2015                    None            
    

 

 well man exam (18-39 years)                    Lifestyle                    
family supportive of relationship                    2015                
    None                

 

 well man exam (18-39 years)                    Lifestyle                    
normal amount of stress                    2015                    None  
              

 

 well man exam (18-39 years)                    Lifestyle                    
normal sleep patterns                    2015                    None    
            

 

 well man exam (18-39 years)                    Lifestyle                    
regular seatbelt use                    2015                    None     
           

 

 well man exam (18-39 years)                    Lifestyle                    
satisfactory marriage/partner relationship                    2015       
             None                

 

 well man exam (18-39 years)                    Lifestyle                    
satisfactory work experience                    2015                    
None                

 

 well man exam (18-39 years)                    Nutrition and Exercise         
           balanced nutrition                    2015                    
None                

 

 well man exam (18-39 years)                    Nutrition and Exercise         
           moderate exercise                    2015                    
None                

 

 well man exam (18-39 years)                    Reproductive System Development
                    normal puberty                    2015               
     None                

 

 well man exam (18-39 years)                    Health Guidance                
    cholesterol level and lipid panel                    2015            
        None                

 

 well man exam (18-39 years)                    Health Guidance                
    regular exercise                    2015                    None     
           

 

 well man exam (18-39 years)                    Health Guidance                
    safety belt use                    2015                    None      
          

 

 well man exam (18-39 years)                    Health Guidance                
    tobacco, drugs and alcohol avoidance                    2015         
           None                

 

 ankle pain                    Location                    on the right        
            2013                    None                

 

 ankle pain                    Quality                    sharp pain           
         2013                    alternating dull pain.  states it is 
worse after sitting and getting up and walking agin                

 

 ankle pain                    Pertinent Findings                    Denies 
catching                    2013                    None                

 

 ankle pain                    Pertinent Findings                    Denies 
clicking                    2013                    None                

 

 ankle pain                    Pertinent Findings                    Denies 
numbness                    2013                    None                

 

 ankle pain                    Pertinent Findings                    Denies 
redness                    2013                    None                

 

 ankle pain                    Pertinent Findings                    stiffness 
                   2013                    None                

 

 ankle pain                    Pertinent Findings                    Denies 
swelling                    2013                    None                

 

 ankle pain                    Onset of Symptom                    2-3 weeks 
ago                    2013                    states he thinks he 
originally injured it while playing football with friends last fall            
    

 

 ankle pain                    Severity                    mild                
    2013                    None                

 

 ankle pain                    Significant Medical Conditions                  
  degenerative joint disease                    2013                    
None                

 

 skin lesion                    Quality                    chronic             
       2013                    None                

 

 skin lesion                    Quality                    flat                
    2013                    None                

 

 skin lesion                    Quality                    non-tender          
          2013                    None                

 

 skin lesion                    Quality                    worsening           
         2013                    None                

 

 skin lesion                    Location                    left arm           
         2013                    also on hands bilat.                

 

 skin lesion                    Onset and Resolution                    ongoing
                    2013                    None                

 

 skin lesion                    Pertinent Findings                    Denies 
cough                    2013                    None                

 

 skin lesion                    Pertinent Findings                    Denies 
ecchymotic                    2013                    None                

 

 skin lesion                    Alleviating Factors                    
medication                    2013                    had ointment for 
lesions that helps temporarily                

 

 skin lesion                    Severity                    moderate           
         2013                    None                

 

 skin lesion                    Frequency of Episodes                    
unchanged                    2013                    None                

 

 skin lesion                    Triggers                    no known associated 
factors                    2013                    None                

 

 headache                    Onset and Resolution                    sudden in 
onset                    2012                    None                

 

 headache                    Onset of Symptom                    3 days ago    
                2012                    None                

 

 sore throat                    Quality                    acute               
     2012                    None                

 

 sore throat                    Pertinent Findings                    cough    
                2012                    None                

 

 rash                    Location-Major                    on the hands        
            2012                    on right hand                

 

 rash                    Onset of Symptom                    1 month ago       
             2012                    recurrence of former rash, using 
itrakonazole with no results                

 

 headache                    Location                    diffusely             
       2012                    None                

 

 headache                    Quality                    acute                  
  2012                    None                

 

 headache                    Onset and Resolution                    ongoing   
                 2012                    None                

 

 headache                    Limitation on Activities                    does 
not limit activities                    2012                    None     
           

 

 headache                    Frequency of Episodes                    
increasing                    2012                    None                

 

 headache                    Significant Medical Conditions                    
allergic rhinitis                    2012                    None        
        

 

 headache                    Triggers                    no known associated 
factors                    2012                    None                

 

 sore throat                    Location                    diffusely          
          2012                    None                

 

 sore throat                    Onset and Resolution                    ongoing
                    2012                    None                

 

 sore throat                    Limitation on Activities                    
does not limit oral intake                    2012                    
None                

 

 sore throat                    Significant Medical Conditions                 
   allergic rhinitis                    2012                    None     
           

 

 sore throat                    Triggers                    no known associated 
factors                    2012                    None                

 

 flare up of rash                    Alleviating Factors                    no 
alleviating factors                    2012                    None      
          

 

 flare up of rash                    Pertinent Findings                    
history of similar rash                    2012                    None  
              

 

 flare up of rash                    Pertinent Findings                    
itching                    2012                    None                

 

 flare up of rash                    Pertinent Findings                    
tenderness                    2012                    None                

 

 rash                    Location-Major                    on the arms         
           2012                    None                

 

 rash                    Quality                    expanding                  
  2012                    None                

 

 rash                    Color                    red                    2012                    None                

 

 flare up of rash                    Quality                    dry            
        2012                    None                

 

 flare up of rash                    Quality                    flaking        
            2012                    None                

 

 flare up of rash                    Onset and Resolution                    
gradual in onset                    2012                    None         
       

 

 flare up of rash                    Limitation on Activities                  
  does not limit activities                    2012                    
None                

 

 flare up of rash                    Severity                    mild          
          2012                    None                

 

 flare up of rash                    Severity                    worsening     
               2012                    None                

 

 flare up of rash                    Triggers                    no known 
triggers                    2012                    None                

 

 flare up of rash                    Location-Extremities                    on 
the right hand                    2012                    None           
     

 

 flare up of rash                    Quality                    dry            
        2012                    None                

 

 flare up of rash                    Quality                    flaking        
            2012                    None                

 

 flare up of rash                    Onset of Symptom                    3 
months ago                    2012                    None                

 

 flare up of rash                    Color                    black            
        2012                    None                

 

 flare up of rash                    Color                    erythematous     
               2012                    None                

 

 flare up of rash                    Onset and Resolution                    
gradual in onset                    2012                    None         
       

 

 flare up of rash                    Limitation on Activities                  
  does not limit activities                    2012                    
None                

 

 flare up of rash                    Severity                    mild          
          2012                    None                

 

 flare up of rash                    Severity                    worsening     
               2012                    None                

 

 flare up of rash                    Prior Treatments                    
partially responsive to treatment                    2012                
    None                

 

 flare up of rash                    Triggers                    no known 
triggers                    2012                    None                

 

 flare up of rash                    Alleviating Factors                    no 
alleviating factors                    2012                    None      
          

 

 flare up of rash                    Pertinent Findings                    
history of similar rash                    2012                    None  
              

 

 flare up of rash                    Pertinent Findings                    
itching                    2012                    None                

 

 flare up of rash                    Pertinent Findings                    
tenderness                    2012                    None                



                                                                    



Advance Directives

          No Advance Directive data                                            
                        



Encounters

                      





 Encounter                    Performer                    Location            
        Codes                    Date                

 

                     ( 42839 EST. PATIENT, LEVEL III

Diagnosis: Gastro-esophageal reflux disease without esophagitis[ICD10: K21.9]

Diagnosis: Nausea[ICD10: R11.0]

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                                   
   Klarissa Landrum MD, [facility]                  
  CPT-4: 91405                    03/15/2018                

 

                     ([id number]) Patient admitted to the hospital from clinic (NO 
CHARGE)

Diagnosis: Gastro-esophageal reflux disease without esophagitis[ICD10: K21.9]

Diagnosis: Abnormal weight loss[ICD10: R63.4]

Diagnosis: Nausea[ICD10: R11.0]                                      Meghan Landrum MD, [facility]                    CPT-4: 
15523X                    2018                

 

                     01497 EST. PATIENT, LEVEL III

Diagnosis: Gastro-esophageal reflux disease without esophagitis[ICD10: K21.9]

Diagnosis: Abnormal weight loss[ICD10: R63.4]

Diagnosis: Other malaise[ICD10: R53.81]                                      
Franca Landrum MD, [facility]                    CPT-4
: 55864                    2018                

 

                     64432 EST. PATIENT, LEVEL IV

Diagnosis: Abnormal weight loss[ICD10: R63.4]

Diagnosis: Benign paroxysmal vertigo, bilateral[ICD10: H81.13]

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

Diagnosis: Other malaise[ICD10: R53.81]

Diagnosis: Other fatigue[ICD10: R53.83]                                      
Franca Landrum MD, [facility]                    CPT-4
: 98951                    2018                

 

                     84299 EST. PATIENT, LEVEL III

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                                   
   Franca Landrum MD, [facility]                    CPT
-4: 22353                    2017                

 

                     29134 EST. PATIENT, LEVEL IV

Diagnosis: Acute bronchitis due to other specified organisms[ICD10: J20.8]

Diagnosis: Other allergic rhinitis[ICD10: J30.89]                              
        Franca Landrum MD, [facility]                 
   CPT-4: 29726                    05/15/2017                

 

                     ([id number]) 20674 EST. PATIENT, LEVEL III

Diagnosis: Benign paroxysmal vertigo, bilateral[ICD10: H81.13]                 
                     Klarissa Landrum MD, [facility]
                    CPT-4: 83575                    2017                

 

                     83754 EST. PATIENT, LEVEL III

Diagnosis: Pain in right wrist[ICD10: M25.531]                                 
     Franca Landrum MD, [facility]                    
CPT-4: 35864                    2017                

 

                     (72325) PREV VISIT EST AGE 18-39

Diagnosis: Encounter for general adult medical examination without abnormal 
findings[ICD10: Z00.00]                                      Meghan Landrum MD, [facility]                    CPT-4: 97682         
           06/15/2016                

 

                     (29699) PREV VISIT EST AGE 18-39

Diagnosis: PREVENTIVE PHYSICAL EXAM[ICD9: V70.0]                               
       Meghan Landrum MD, [facility]                
    CPT-4: 37115                    2015                

 

                     ([id number]) 51704 EST. PATIENT, LEVEL III

Diagnosis: ACHILLES TENDINITIS[ICD9: 726.71]                                   
   Meghan Landrum MD, [facility]                    
CPT-4: 92729                    2013                

 

                     ([id number]) 29226 EST. PATIENT, LEVEL III

Diagnosis: Rash[ICD9: 782.1]

Diagnosis: PRURITIC DISORDER[ICD9: 698.9]                                      
Klarissa Landrum MD, [facility]                    
CPT-4: 55149                    2013                

 

                     ([id number]) 29816 EST. PATIENT, LEVEL III

Diagnosis: ACUTE URI[ICD9: 465.9]

Diagnosis: Rash[ICD9: 782.1]                                      Klarissa Landrum MD, [facility]                    CPT-4: 
52064                    2012                

 

                     ([id number]) 11683 EST. PATIENT, LEVEL III

Diagnosis: PRURITIC DISORDER[ICD9: 698.9]

Diagnosis: NONSPECIF SKIN ERUPT NEC[ICD9: 782.1]                               
       Meghan Landrum MD, [facility]                
    CPT-4: 26687                    2012                

 

                     70628 EST. PATIENT, LEVEL IV

Diagnosis: Rash[ICD9: 782.1]

Diagnosis: Localized pruritus[ICD9: 698.9]

Diagnosis: Elevated blood pressure[ICD9: 796.2]                                
      [facility]                 
   CPT-4: 26092                    2012                



                                                                               
                                                                               
                                                                               
                               



Plan of Care

                      





 Planned Activity                    Notes                    Codes            
        Status                    Date                

 

 Patient Education: Patient Medication Summary                                 
                            Completed                    2018            
    

 

 Visit Plan:                     GERD-Nausea-symptoms have completely resolved 
since stopping RA medications -patient to stay off medications and call if 
symptoms return RA-patient has appt with rheumatologist in  -patient to call 
to get on waiting list to see if they can see him sooner -currently 
asymptomatic -monitor symptoms and call with any concerns

                                                             03/15/2018        
        

 

 Appointment: Klarissa Roberts 

WPtel:[phone] [address] [facility]

[place]                                                             (15 min) 
Moderate                    03/15/2018                

 

 Patient Education: Patient Medication Summary                                 
                            Completed                    03/15/2018            
    

 

 Visit Plan:                     Admit to hospital under observation for iv 
fluids, HIDA scan and planned EGD tomorrow.

                                                             2018        
        

 

 Appointment: Meghan Landrum 

WPtel:[phone] [address] [facility]

[place]                                                             (15 min) 
Moderate                    2018                

 

 Patient Education: Patient Medication Summary                                 
                            Completed                    2018            
    

 

 Visit Plan:                     Malaise, weight loss - labs OK - pt is to 
follow with his RA specialist - will start on PPI, if symptoms do not improve 
will consider CT or referral to GI specialist Esophageal Reflux - the patient 
has been counseled against excessive intake of caffeine, spicy foods, peppermint
, and cinnamon - all of which can exacerbate esophageal reflux. The patient is 
to take medications as prescribed and call the office if the symptoms are not 
improving.

                                                             2018        
        

 

 Appointment: Franca Casper 

WPtel:[phone]

 [facility] [facility]

[place]                                                             (30 min) Complex
                    2018                

 

 Patient Education: Patient Medication Summary                                 
                            Completed                    2018            
    

 

 Visit Plan:                     BPPV - Benign Paroxysmal Positional Vertigo - 
discussed diagnosis with the patient, offered the pt the appropriate additional 
information in hand-out. Pt instructed in home exercises to help alleviate and 
prevent future recurrent episodes of vertigo. Pt informed that if symptoms 
worsen, call the office for further instructions/medication interventions. RA - 
Pt is to follow with specialist at  - pt is to notify clinic of any changes 
in current treatment plan or with any acute questions or concerns. Weight loss 
- will check labs and treat as indicated.

                                                             2018        
        

 

 Appointment: Franca Casper 

WPtel:[phone]

 [address] [facility]

                                                             (15 min) 
Moderate                    2018                

 

 Patient Education: Patient Medication Summary                                 
                            Completed                    2018            
    

 

 Visit Plan:                     RA - acute flare - will send RX - pt has an 
appointment with a new rheumatologist in January. Pt is to notify clinic if 
symptoms do not improve, if they worsen, or with any acute changes, questions, 
or concerns.

                                                             2017        
        

 

 Visit Plan:                     RA - acute flare - will send RX - pt has an 
appointment with a new rheumatologist in January. Pt is to notify clinic if 
symptoms do not improve, if they worsen, or with any acute changes, questions, 
or concerns.

                                                             2017        
        

 

 Appointment: Franca Casper 

WPtel:[phone]

 [address] [facility]

                                                             (30 min) Complex
                    2017                

 

 Patient Education: Patient Medication Summary                                 
                            Completed                    2017            
    

 

 Visit Plan:                     Bronchitis - acute case of bronchitis 
identified. Pt has been given antibiotics, steroid as appropriate, and pt has 
been instructed to call if symptoms are not improved, or if symptoms acutely 
worsen. Allergies - chronic - recommended pt to use allergy medication as 
prescribed. Pt has been counseled as to the appropriate use of the medication. 
Pt to call if allergy symptoms are not controlled with the medication. If using 
nasal spray, instructions as follows: Nasal spray- use twice daily, one spray 
per nostril twice daily, after 30 minutes, rinse out nose with saline spray.. 
Use opposite hand per nostril to spray in the nasal steroid allergy spray.

                                                             05/15/2017        
        

 

 Visit Plan:                     Bronchitis - acute case of bronchitis 
identified. Pt has been given antibiotics, steroid as appropriate, and pt has 
been instructed to call if symptoms are not improved, or if symptoms acutely 
worsen. Allergies - chronic - recommended pt to use allergy medication as 
prescribed. Pt has been counseled as to the appropriate use of the medication. 
Pt to call if allergy symptoms are not controlled with the medication. If using 
nasal spray, instructions as follows: Nasal spray- use twice daily, one spray 
per nostril twice daily, after 30 minutes, rinse out nose with saline spray.. 
Use opposite hand per nostril to spray in the nasal steroid allergy spray.

                                                             05/15/2017        
        

 

 Appointment: Franca Casper 

WPtel:[phone]

 [facility] [facility]                                                             (15 min) 
Moderate                    05/15/2017                

 

 Patient Education: Patient Medication Summary                                 
                            Completed                    05/15/2017            
    

 

 Visit Plan:                     BPPV - Benign Paroxysmal Positional Vertigo - 
discussed diagnosis with the patient, offered the pt the appropriate additional 
information in hand-out. Pt instructed in home exercises to help alleviate and 
prevent future recurrent episodes of vertigo. Pt informed that if symptoms 
worsen, call the office for further instructions/medication interventions.

                                                             2017        
        

 

 Appointment: Klarissa Roberts 

WPtel:[phone]

 [facility]

                                                             (30 min) Complex
                    2017                

 

 Patient Education: Patient Medication Summary                                 
                            Completed                    2017            
    

 

 Patient Education: .Amazing charts Paroxysmal positional vertigo              
                                               Completed                    2017                

 

 Patient Education: Obesity                                                    
         Completed                    2017                

 

 Visit Plan:                     Right wrist pain - The pt is to use prn 
antiinflammatories to manage acute pain. The patient is to call the office if 
the pain is worsening or does not improve. Intermittent and varying joint pain 
- ongoing for the past 5-6 years - will check labs

                                                             2017        
        

 

 Appointment: Franca Casper 

WPtel:[phone]

 [facility] [facility]                                                             (10 min) Simple 
                   2017                

 

 Patient Education: Patient Medication Summary                                 
                            Completed                    2017            
    

 

 Visit Plan:                     Well Adult - pt was counseled about diet, 
exercise, and encouraged to follow a heart healthy diet and increase activity 
level. The patient was instructed to RTC yearly for well adult exams and PRN 
for acute illnesses. The pt was also instructed to have yearly labs for check 
of cholesterol, thyroid, chem panel, CBC, and renal functioning.

                                                             06/15/2016        
        

 

 Appointment: Meghan Landrum 

WPtel:[phone]

 [facility] [facility]

                                                             (15 min) 
Moderate                    06/15/2016                

 

 Patient Education: Patient Medication Summary                                 
                            Completed                    06/15/2016            
    

 

 Patient Education: Obesity                                                    
         Completed                    06/15/2016                

 

 Visit Plan:                     Well Adult - pt was counseled about diet, 
exercise, and encouraged to follow a heart healthy diet and increase activity 
level. The patient was instructed to RTC yearly for well adult exams and PRN 
for acute illnesses. The pt was also instructed to have yearly labs for check 
of cholesterol, thyroid, chem panel, CBC, and renal functioning.

                                                             2015        
        

 

 Appointment: Meghan Landrum 

WPtel:[phone]

 [address] [place], [place] [address]                                                             (15 min) 
Moderate                    2015                

 

 Patient Education: Patient Medication Summary                                 
                            Completed                    2015            
    

 

 Visit Plan:                     Achilles tendonitis - uncontrolled symptoms- 
recommend pt to take antiinflammatory as directed for pain control. Use tylenol 
for break through pain symptoms.

                                                             2013        
        

 

 Appointment: Meghan Landrum 

WPtel:[phone]

 [address] [place] [address]                                                             Follow up       
             2013                

 

 Patient Education: Patient Medication Summary                                 
                            Completed                    2013            
    

 

 Patient Education: Achilles Tendon Injury Exercises                           
                                  Completed                    2013      
          

 

 Patient Education: Achilles Tendon Injury Exercises: Illustration             
                                                Completed                                    

 

 Visit Plan:                     Rash-chronic--plan to treat with short course 
of prednisone and monitor symptoms. Patient is to call if rash does not resolve 
completely or if any worse. Finish anti-fungal medication as well. Patient 
verbalized understanding.

                                                             2013        
        

 

 Appointment: Klarissa Roberts 

WPtel:[phone]

 [address] [place] [address]                                                             Other           
         2013                

 

 Patient Education: Patient Medication Summary                                 
                            Completed                    2013            
    

 

 Visit Plan:                     Rash-culture done today in the office-plan to 
treat with short course of prednisone and monitor symptoms. Patient is to call 
if rash does not resolve completely or if any worse. Finish anti-fungal 
medication as well. Patient verbalized understanding. URI - Pt advised to 
increase fluids, vitamin C. Discussed natural and expected course of this 
diagnosis and need to alert me if symtpoms do not follow expected course, or if 
any worse. RX sent to patient's pharmacy.Rocephin and kenalog injection today 
in the office.

                                                             2012        
        

 

 Appointment: Klarissa Roberts 

WPtel:[phone]

 [facility] [place] [address]

                                                             Other           
         2012                

 

 Patient Education: Patient Medication Summary                                 
                            Completed                    2012            
    

 

 Visit Plan:                     Rash - Itching - uncertain eitiology - but 
with his response to the steroid and antifungal and the diffuse look of a 
folliculitis - I will treat Jose with a different antifungal and steroid and 
follow his progress. itraconazole 100 mg x 20 days prednisone taper

                                                             2012        
        

 

 Appointment: Meghan Landrum 

WPtel:[phone]

 [address] [facility]

                                                             Other           
         2012                

 

 Patient Education: Patient Medication Summary                                 
                            Completed                    2012            
    

 

 Visit Plan:                     Rash -uncertain eitiology - unable to get 
sample for reliable culture- will empirically treat with prednisone taper and 
diflucan x 7 days. Pt is to call if the rash does not improve or if it worsens. 
Elevated blood pressure - check blood pressure at home, cut back on salty foods
, call in blood pressure in one week.

                                                             2012        
        

 

 Appointment: Meghan Landrum 

WPtel:[phone]

 [address] [facility]

                                                             Other           
         2012                

 

 Patient Education: Patient Medication Summary                                 
                            Completed                    2012            
    



                                                                               
                                                                               
                                                                               
                                                                               
                                                                               
                                                               



Instructions

                      





 Comment                

 

                     . Rash -uncertain eitiology - unable to get sample for 
reliable culture- will empirically treat with prednisone taper and diflucan x 7 
days.

Pt is to call if the rash does not improve or if it worsens.



Elevated blood pressure - check blood pressure at home, cut back on salty foods
, call in blood pressure in one week.                                  

 

                     . RA - acute flare - will send RX - pt has an appointment 
with a new rheumatologist in January.  Pt is to notify clinic if symptoms do 
not improve, if they worsen, or with any acute changes, questions, or concerns.
                                   

 

                     . RA - acute flare - will send RX - pt has an appointment 
with a new rheumatologist in January.  Pt is to notify clinic if symptoms do 
not improve, if they worsen, or with any acute changes, questions, or concerns.
                                   

 

                     TREAT WITH PENNSAID 10 TO 15 DROPS FOUR TIMES DAILY X 2 
WEEKS.

 . Achilles tendonitis -  uncontrolled symptoms- recommend pt to take 
antiinflammatory as directed for pain control.



Use tylenol for break through pain symptoms.                                  

 

                     . Rash - Itching - uncertain eitiology - but with his 
response to the steroid and antifungal and the diffuse look of a folliculitis - 
I will treat Jose with a different antifungal and steroid and follow his 
progress.



itraconazole 100 mg x 20 days

prednisone taper                                  

 

                     . Malaise, weight loss - labs OK - pt is to follow with 
his RA specialist - will start on PPI, if symptoms do not improve will consider 
CT or referral to GI specialist



Esophageal Reflux - the patient has been counseled against excessive intake of 
caffeine, spicy foods, peppermint, and cinnamon - all of which can exacerbate 
esophageal reflux.

The patient is to take medications as prescribed and call the office if the 
symptoms are not improving.

                                  

 

                     . Rash-culture done today in the office-plan to treat with 
short course of prednisone and monitor symptoms. Patient is to call if rash 
does not resolve completely or if any worse. Finish anti-fungal medication as 
well. Patient verbalized understanding. 



 URI - Pt advised to increase fluids, vitamin C.  Discussed natural and 
expected course of this diagnosis and need to alert me if symtpoms do not 
follow expected course, or if any worse. RX sent to patient's pharmacy.Rocephin 
and kenalog injection today in the office.                                   

 

                     . BPPV - Benign Paroxysmal Positional Vertigo - discussed 
diagnosis with the patient, offered the pt the appropriate additional 
information in hand-out.  Pt instructed in home exercises to help alleviate and 
prevent future recurrent episodes of vertigo.  Pt informed that if symptoms 
worsen, call the office for further instructions/medication interventions.



RA - Pt is to follow with specialist at  - pt is to notify clinic of any 
changes in current treatment plan or with any acute questions or concerns. 



Weight loss - will check labs and treat as indicated.                          
        

 

                     . Admit to hospital under observation for iv fluids, HIDA 
scan and planned EGD tomorrow.                                  

 

                     . Well Adult - pt was counseled about diet, exercise, and 
encouraged to follow a heart healthy diet and increase activity level.  The 
patient was instructed to RTC yearly for well adult exams and PRN for acute 
illnesses.  The pt was also instructed to have yearly labs for check of 
cholesterol, thyroid, chem panel, CBC, and renal functioning.

                                  

 

                     . GERD-Nausea-symptoms have completely resolved since 
stopping RA medications -patient to stay off medications and call if symptoms 
return 



RA-patient has appt with rheumatologist in  -patient to call to get on 
waiting list to see if they can see him sooner -currently asymptomatic -monitor 
symptoms and call with any concerns                                   

 

                     . BPPV - Benign Paroxysmal Positional Vertigo - discussed 
diagnosis with the patient, offered the pt the appropriate additional 
information in hand-out.  Pt instructed in home exercises to help alleviate and 
prevent future recurrent episodes of vertigo.  Pt informed that if symptoms 
worsen, call the office for further instructions/medication interventions.

                                  

 

                     . Right wrist pain - The pt is to use prn 
antiinflammatories to manage acute pain. The patient is to call the office if 
the pain is worsening or does not improve. 



Intermittent and varying joint pain - ongoing for the past 5-6 years - will 
check labs                                  

 

                     steroid shot today 



prednisone 40mg daily x 2 more days, then 20mg daily x 2 days, then stop - let 
me know if your symptoms are not improving



Continue doxycycline for now - will check chest x-ray - if needed will change 
antibiotics. 



[facility] allergy medicine - I will send as a prescription, if it is cheaper over 
the counter get the over the counter (they are the same medicine and dose)



Let me know if you are not getting better.. Bronchitis - acute case of 
bronchitis identified.  Pt has been given antibiotics, steroid as appropriate, 
and pt has been instructed to call if symptoms are not improved, or if symptoms 
acutely worsen.



Allergies - chronic - recommended pt to use allergy medication as prescribed.  
Pt has been counseled as  to the appropriate use of the medication.  Pt to call 
if allergy symptoms are not controlled with the medication.

If using nasal spray, instructions as follows: Nasal spray- use twice daily, 
one spray per nostril twice daily, after 30 minutes, rinse out nose with saline 
spray.. Use opposite hand per nostril to spray in the nasal steroid allergy 
spray.

                                  

 

                     steroid shot today 



prednisone 40mg daily x 2 more days, then 20mg daily x 2 days, then stop - let 
me know if your symptoms are not improving



Continue doxycycline for now - will check chest x-ray - if needed will change 
antibiotics. 



[facility] allergy medicine - I will send as a prescription, if it is cheaper over 
the counter get the over the counter (they are the same medicine and dose)



Let me know if you are not getting better.. Bronchitis - acute case of 
bronchitis identified.  Pt has been given antibiotics, steroid as appropriate, 
and pt has been instructed to call if symptoms are not improved, or if symptoms 
acutely worsen.



Allergies - chronic - recommended pt to use allergy medication as prescribed.  
Pt has been counseled as  to the appropriate use of the medication.  Pt to call 
if allergy symptoms are not controlled with the medication.

If using nasal spray, instructions as follows: Nasal spray- use twice daily, 
one spray per nostril twice daily, after 30 minutes, rinse out nose with saline 
spray.. Use opposite hand per nostril to spray in the nasal steroid allergy 
spray.

                                  

 

                     . Rash-chronic--plan to treat with short course of 
prednisone and monitor symptoms. Patient is to call if rash does not resolve 
completely or if any worse. Finish anti-fungal medication as well. Patient 
verbalized understanding. 



                                  

 

                     retinol or cortisone on the dark skin lesion on back - 

the site on the back of your left calf is called a dermatofibroma is and benign.

 . Well Adult - pt was counseled about diet, exercise, and encouraged to follow 
a heart healthy diet and increase activity level.  The patient was instructed 
to RTC yearly for well adult exams and PRN for acute illnesses.  The pt was 
also instructed to have yearly labs for check of cholesterol, thyroid, chem 
panel, CBC, and renal functioning.

## 2018-12-07 NOTE — ED UPPER EXTREMITY
General


Chief Complaint:  Upper Extremity


Stated Complaint:  R SHOULDER PAIN


Source:  patient


Exam Limitations:  no limitations





History of Present Illness


Date Seen by Provider:  Dec 7, 2018


Time Seen by Provider:  07:17


Initial Comments


Patient presents to ER by private conveyance with chief complaint that he is 

having some right shoulder pain on movement. He is currently in the middle of 

rash at his job working many hours and this pain started yesterday. He has a 

history of rheumatoid arthritis. Last week he had some pain in his left 

shoulder and was received a IM shot of steroids from his primary care provider. 

He is in between rheumatologist at this moment and not on any anti-rheumatologic

's. He's having no fevers chills redness swelling or heat in his right shoulder 

but he does have some swelling in his right hand. He is not using NSAIDs. He 

did have some leftover Medrol Dosepak and took 2 tablets this morning.





Allergies and Home Medications


Allergies


Coded Allergies:  


     No Known Drug Allergies (Unverified , 3/5/18)





Patient Home Medication List


Home Medication List Reviewed:  Yes





Review of Systems


Constitutional:  No chills, No diaphoresis


EENTM:  No ear discharge, No ear pain


Respiratory:  No cough, No short of breath


Cardiovascular:  No chest pain, No edema


Gastrointestinal:  No abdominal pain, No constipation, No diarrhea, No nausea


Genitourinary:  No discharge, No dysuria


Musculoskeletal:  No back pain, No joint pain





Past Medical-Social-Family Hx


Patient Social History


Alcohol Use:  Rarely Uses


Recreational Drug Use:  Yes


Drug of Choice:  THC


Smoking Status:  Current Everyday Smoker


Type Used:  Cigarettes


Former Smoker, Quit:  Feb 5, 2018


Recent Foreign Travel:  No


Contact w/Someone Who Travel:  No


Recent Hopitalizations:  No


Physical Abuse:  No


Sexual Abuse:  No





Seasonal Allergies


Seasonal Allergies:  No





Past Medical History


Surgeries:  Yes (HERNIA AS BABY)


Respiratory:  No


Cardiac:  No


Neurological:  No


Sexually Transmitted Disease:  No


HIV/AIDS:  No


Genitourinary:  No


Gastrointestinal:  Yes


Gastroesophageal Reflux, Chronic Diarrhea


Musculoskeletal:  Yes


Rheumatoid Arthritis


Endocrine:  No


HEENT:  No


Loss of Vision:  Bilateral


Cancer:  No


Psychosocial:  Yes


Depression


Integumentary:  Yes


Eczema


Blood Disorders:  No


Adverse Reaction/Blood Tranf:  No





Family Medical History


No Pertinent Family Hx





Physical Exam


Vital Signs





Vital Signs - First Documented








 12/7/18





 07:15


 


Temp 97.5


 


Pulse 85


 


Resp 18


 


B/P (MAP) 137/97 (110)


 


Pulse Ox 95





Capillary Refill :


Height, Weight, BMI


Height: 6'1.00"


Weight: 206lbs. 7.0oz. [id number]; 27.2 BMI


Method:Estimated


General Appearance:  WD/WN, no apparent distress


Neck:  non-tender, full range of motion


Cardiovascular:  normal peripheral pulses, regular rate, rhythm, no edema


Respiratory:  no respiratory distress, no accessory muscle use


Gastrointestinal:  non tender, soft


Shoulder:  normal inspection, no evidence of injury, limited ROM (secondary to 

pain), pain, soft tissue tenderness


Elbow/Forearm:  normal inspection, non-tender, no evidence of injury, normal ROM

, Right


Hand:  normal inspection, non-tender, no evidence of injury, normal ROM, Right





Procedures/Interventions





Progress


Right shoulder injection with 40 mg Depo-Medrol, 1 cc of 1% lidocaine and 1 cc 

of half percent Marcaine without epinephrine. Shoulder was positioned in the 

usual fashion and cleaned with Betadine and allowed to set for 2 minutes and 

then cleaned thoroughly with alcohol. The landmarks were found using sterile 

technique and then a 25-gauge 1-1/2 inch needle was used to access the right 

glenohumeral capsule. Aspiration yielded no fluid. The joint was then injected 

with total of 3 cc as listed above. Patient tolerated procedure well. Band-Aid 

was placed after the skin was re-cleaned with alcohol.





Progress/Results/Core Measures


Results/Orders


My Orders





Orders - ZACKARY LUCIA


Methylprednisolone Acetate Inj (Depo-Med (12/7/18 07:30)


Lidocaine 1% Inj 20 Ml (Xylocaine 1% Inj (12/7/18 07:30)


Bupivacaine 0.5% Injection (Sensorcaine (12/7/18 07:30)


Ketorolac Injection (Toradol Injection) (12/7/18 07:40)





Medications Given in ED





Current Medications








 Medications  Dose


 Ordered  Sig/Jessica


 Route  Start Time


 Stop Time Status Last Admin


Dose Admin


 


 Bupivacaine HCl  30 ml  ONCE  ONCE


 INJ  12/7/18 07:30


 12/7/18 07:31 DC 12/7/18 07:45


30 ML


 


 Ketorolac


 Tromethamine  30 mg  STK-MED ONCE


 .ROUTE  12/7/18 07:40


 12/7/18 07:42 DC 12/7/18 07:44


30 MG


 


 Lidocaine HCl  20 ml  ONCE  ONCE


 INJ  12/7/18 07:30


 12/7/18 07:31 DC 12/7/18 07:45


20 ML


 


 Methylprednisolone


 Acetate  40 mg  ONCE  ONCE


 IA  12/7/18 07:30


 12/7/18 07:31 DC 12/7/18 07:45


40 MG








Vital Signs/I&O











 12/7/18





 07:15


 


Temp 97.5


 


Pulse 85


 


Resp 18


 


B/P (MAP) 137/97 (110)


 


Pulse Ox 95











Progress


Progress Note :  


   Time:  07:36


Progress Note


Toradol 30 mg IM. We discussed risks benefits and alternatives to doing an intra

-articular injection of his right shoulder with steroids, lidocaine and 

Marcaine. The plan is to use a smaller dose and sent home with prednisone as 

necessary. Follow-up with primary care and seek rheumatologic consultation.





Departure


Impression





 Primary Impression:  


 Rheumatoid arthritis flare


 Additional Impression:  


 Right shoulder pain


 Qualified Codes:  M25.511 - Pain in right shoulder


Disposition:  01 HOME, SELF-CARE


Condition:  Stable





Departure-Patient Inst.


Decision time for Depature:  07:52


Referrals:  


ANNY BLACK MD (PCP/Family)


Primary Care Physician


Patient Instructions:  Active Range of Motion Exercises, Neck and Shoulders





Add. Discharge Instructions:  


Get some sleep today started some Naprosyn this afternoon 2 tablets twice a day 

as needed for pain. You can also use Tylenol 1000 mg every 8 hours as needed 

for pain. Muscle rubs such as icy hot as well as heat applications or useful 

over the shoulder joint. If you're not seeing improvement in 1-2 days then you 

may start taking the prednisone 2 tablets daily for the next 5 days. Plan follow

-up with primary care and discuss referral to rheumatology.


 All discharge instructions reviewed with patient and/or family. Voiced 

understanding.


Scripts


Prednisone (Prednisone) 20 Mg Tab


40 MG PO DAILY for 5 Days, #10 TAB 0 Refills


   Prov: ZACKARY LUCIA         12/7/18


Work/School Note:  Work Release Form   Date Seen in the Emergency Department:  

Dec 7, 2018


   Return to Work:  Dec 8, 2018


   Restrictions:  No Restrictions











ZACKARY LUCIA Dec 7, 2018 07:37

## 2019-12-05 ENCOUNTER — HOSPITAL ENCOUNTER (EMERGENCY)
Dept: HOSPITAL 75 - ER | Age: 39
Discharge: HOME | End: 2019-12-05
Payer: COMMERCIAL

## 2019-12-05 VITALS — HEIGHT: 72.83 IN | WEIGHT: 233.69 LBS | BODY MASS INDEX: 30.97 KG/M2

## 2019-12-05 VITALS — SYSTOLIC BLOOD PRESSURE: 147 MMHG | DIASTOLIC BLOOD PRESSURE: 104 MMHG

## 2019-12-05 DIAGNOSIS — F17.210: ICD-10-CM

## 2019-12-05 DIAGNOSIS — F32.9: ICD-10-CM

## 2019-12-05 DIAGNOSIS — K21.9: ICD-10-CM

## 2019-12-05 DIAGNOSIS — M05.612: Primary | ICD-10-CM

## 2019-12-05 PROCEDURE — 99284 EMERGENCY DEPT VISIT MOD MDM: CPT

## 2019-12-05 PROCEDURE — 96372 THER/PROPH/DIAG INJ SC/IM: CPT

## 2019-12-05 NOTE — ED UPPER EXTREMITY
General


Chief Complaint:  Upper Extremity


Stated Complaint:  LEFT SHOULDER PAIN


Nursing Triage Note:  


Pt ambulates to [facility] with c/o left shoulder pain since last night. Pt states he




has Hx of RA and is having a flare up that is uncontrolled with meds.


Nursing Sepsis Screen:  No Definite Risk


Source:  patient


Exam Limitations:  no limitations





History of Present Illness


Date Seen by Provider:  Dec 5, 2019


Time Seen by Provider:  04:42


Initial Comments


Patient presents to ER by private conveyance with chief complaint of rheumatoid 

flare his left shoulder. He has decreased range of motion pain. Just saw his 

rheumatologist yesterday and at that time it was a dull ache but then last night

he continued to get worse. Previously he had thought maybe it was because he 

went ice skating and was carrying his daughter around so he did not think 

anything of it when he was at her rheumatologist's office. He had a couple 

tablets of prednisone laying around the house so he took those that did not 

touch the pain. He cannot tolerate NSAIDs since he was hospitalized for NSAIDs. 

He is on methotrexate. He's having no fevers chills nausea vomiting sweats cough

or shortness of breath.





Allergies and Home Medications


Allergies


Coded Allergies:  


     No Known Drug Allergies (Unverified , 3/5/18)





Home Medications


Prednisone 20 Mg Tab, 40 MG PO DAILY


   Prescribed by: ZACKARY LUCIA on 12/7/18 0754





Patient Home Medication List


Home Medication List Reviewed:  Yes





Review of Systems


Constitutional:  No chills, No diaphoresis


EENTM:  No ear discharge, No ear pain


Respiratory:  No cough, No hemoptysis


Cardiovascular:  No chest pain, No palpitations


Gastrointestinal:  No abdominal pain, No constipation


Genitourinary:  No discharge, No dysuria


Musculoskeletal:  No back pain; joint pain; No joint swelling


Skin:  No change in color, No dryness





All Other Systems Reviewed


Negative Unless Noted:  Yes





Past Medical-Social-Family Hx


Patient Social History


Alcohol Use:  Denies Use


Recreational Drug Use:  Yes


Drug of Choice:  THC


Smoking Status:  Current Everyday Smoker


Type Used:  Cigarettes


Former Smoker, Quit:  Feb 5, 2018


Recent Foreign Travel:  No


Contact w/Someone Who Travel:  No


Recent Infectious Disease Expo:  No


Recent Hopitalizations:  No


Physical Abuse:  No


Sexual Abuse:  No


Mistreated:  No


Fear:  No





Seasonal Allergies


Seasonal Allergies:  No





Past Medical History


Surgeries:  Yes (HERNIA AS BABY)


Respiratory:  No


Cardiac:  No


Neurological:  No


Sexually Transmitted Disease:  No


HIV/AIDS:  No


Genitourinary:  No


Gastrointestinal:  Yes


Gastroesophageal Reflux, Chronic Diarrhea


Musculoskeletal:  Yes


Rheumatoid Arthritis


Endocrine:  No


HEENT:  No


Loss of Vision:  Bilateral


Cancer:  No


Psychosocial:  Yes


Depression


Integumentary:  Yes


Eczema


Blood Disorders:  No


Adverse Reaction/Blood Tranf:  No





Family Medical History


No Pertinent Family Hx





Physical Exam


Vital Signs





Vital Signs - First Documented








 12/5/19





 04:35


 


Temp 36.8


 


Pulse 87


 


Resp 22


 


B/P (MAP) 147/104 (118)


 


Pulse Ox 99


 


O2 Delivery Room Air





Capillary Refill : Less Than 3 Seconds


Height, Weight, BMI


Height: 6'1.00"


Weight: 210lbs. 7.0oz. 95.789455ph; 30.00 BMI


Method:Stated


General Appearance:  WD/WN, mild distress


HEENT:  PERRL/EOMI, pharynx normal


Neck:  full range of motion, normal inspection


Cardiovascular:  normal peripheral pulses, regular rate, rhythm


Respiratory:  no respiratory distress, no accessory muscle use


Shoulder:  normal inspection, bone tenderness (anterior glenohumeral joint), 

limited ROM (left side); No swelling (negative for redness, swelling or pain in 

the soft tissues)


Elbow/Forearm:  normal inspection, non-tender, no evidence of injury, normal 

ROM, Left


Wrist:  Yes normal inspection, Yes non-tender, Yes no evidence of injury, Yes 

normal ROM





Progress/Results/Core Measures


Results/Orders


My Orders





Orders - ZACKARY LUCIA


Methylprednisolone Acetate Inj (Depo-Med (12/5/19 05:00)





Vital Signs/I&O











 12/5/19





 04:35


 


Temp 36.8


 


Pulse 87


 


Resp 22


 


B/P (MAP) 147/104 (118)


 


Pulse Ox 99


 


O2 Delivery Room Air














Blood Pressure Mean:                    118


POS








Progress


Progress Note :  


   Time:  04:58


Progress Note


His story is consistent with a rheumatoid arthritis flare. Plan to give him a 

shot of 40 mg Depo-Medrol. He has declined intra-articular injections. Plan to 

put him on some prednisone for the next 5 days follow-up with Dr. Landrum as 

necessary early next week.





Departure


Impression





   Primary Impression:  


   Rheumatoid arthritis flare


   Additional Impression:  


   Left anterior shoulder pain


Disposition:  01 HOME, SELF-CARE


Condition:  Stable





Departure-Patient Inst.


Decision time for Depature:  04:59


Referrals:  


ANNY LANDRUM MD (PCP/Family)


Primary Care Physician


Patient Instructions:  Rheumatoid Arthritis (DC), Shoulder Pain (DC)





Add. Discharge Instructions:  


Heat, ice, topical creams such as icy hot, Biofreeze, Aspercreme etc.


Prednisone 2 tablets twice daily for the next 3 days and then one tablet twice 

daily for 2 days.


Follow-up with primary care if not seeing improvement early next week. 


Tylenol 1000 mg every 8 hours as necessary for pain.


All discharge instructions reviewed with patient and/or family. Voiced 

understanding.


Scripts


Prednisone (Prednisone) 20 Mg Tab


40 MG PO BID for 5 Days, #16 TAB 0 Refills


   2 tablets twice daily for 3 days


   One tablet twice daily for 2 days


   Prov: ZACKARY LUCIA         12/5/19


Work/School Note:  Work Release Form   Date Seen in the Emergency Department:  D

ec 5, 2019


   Return to Work:  Dec 11, 2019


   Restrictions:  Need Release from Doctor


   Other Restrictions Listed Below:  No lift, push, pull more than 10 pounds 

with left arm until 12/12/19.











ZACKARY LUCIA                  Dec 5, 2019 05:01


POS

## 2021-02-25 ENCOUNTER — HOSPITAL ENCOUNTER (EMERGENCY)
Dept: HOSPITAL 75 - ER | Age: 41
Discharge: HOME | End: 2021-02-25
Payer: COMMERCIAL

## 2021-02-25 VITALS — BODY MASS INDEX: 30.47 KG/M2 | HEIGHT: 72.83 IN | WEIGHT: 229.94 LBS

## 2021-02-25 VITALS — SYSTOLIC BLOOD PRESSURE: 136 MMHG | DIASTOLIC BLOOD PRESSURE: 68 MMHG

## 2021-02-25 DIAGNOSIS — F17.210: ICD-10-CM

## 2021-02-25 DIAGNOSIS — M06.9: Primary | ICD-10-CM

## 2021-02-25 DIAGNOSIS — Z79.52: ICD-10-CM

## 2021-02-25 PROCEDURE — 99284 EMERGENCY DEPT VISIT MOD MDM: CPT

## 2021-02-25 NOTE — ED GENERAL
General


Chief Complaint:  General Problems/Pain


Stated Complaint:  LEFT WRIST PAIN-ARTHRITIS


Source of Information:  Patient


Exam Limitations:  No Limitations


 (DIOR WEBSTER MED STUDEN)





History of Present Illness


Date Seen by Provider:  Feb 25, 2021


Time Seen by Provider:  02:00


Initial Comments


Jose is a 39 y/o male that presents to the ER with two day of 8/10 left wrist 

pain. Patient has a known hx of RA. 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 The pain is exacerbated when he tries to

flex or extend wrist. 10mg of Prednisone and Tylenol have not helped with the 

pain. (+) for decreased movement and strength in left hand. Denies the 

following: numbness or tingling in left hand, N/V, F/C, SOB, Chest pain, 

Abdominal pain and  symptoms at this time. The patient is currently on 

Methotrexate for RA. He also has Prednisone at home for flares. He denies hx of 

gout, pseudogout or recent trauma to this hand. He has had similar pain/swelling

with other joints in the past.


Timing/Duration:  1/2 Hour


Severity:  Moderate


Modifying Factors:  improves with Movement


Associated Systoms:  Denies Symptoms (DIOR WEBSTEREN)





Allergies and Home Medications


Allergies


Coded Allergies:  


     No Known Drug Allergies (Unverified , 3/5/18)





Home Medications


Prednisone 20 Mg Tab, 40 MG PO DAILY


   Prescribed by: ZACKARY LUCIA on 12/7/18 0754


Prednisone 20 Mg Tab, 40 MG PO BID


   2 tablets twice daily for 3 days One tablet twice daily for 2 days 


   Prescribed by: ZACKARY LUCIA on 12/5/19 0501





Patient Home Medication List


Home Medication List Reviewed:  Yes


 (LIZZ HUFFMAN MD)





Review of Systems


Review of Systems


Constitutional:  see HPI


EENTM:  no symptoms reported


Respiratory:  see HPI


Cardiovascular:  see HPI


Gastrointestinal:  see HPI


Genitourinary:  see HPI


Musculoskeletal:  joint pain, muscle weakness


Skin:  no symptoms reported


Psychiatric/Neurological:  No Symptoms Reported


Hematologic/Lymphatic:  No Symptoms Reported (DIOR WEBSTER)





Past Medical-Social-Family Hx


Past Med/Social Hx:  Reviewed Nursing Past Med/Soc Hx


 (LIZZ HUFFMAN MD)





Patient Social History


Alcohol Use:  Occasionally Uses


Drug of Choice:  THC


Smoking Status:  Current Everyday Smoker


Type Used:  Cigarettes


Former Smoker, Quit:  Feb 5, 2018


Recent Hopitalizations:  No


 (LIZZ HUFFMAN MD)





Seasonal Allergies


Seasonal Allergies:  No


 (LIZZ HUFFMAN MD)





Past Medical History


Surgeries:  Yes (HERNIA AS BABY)


Respiratory:  No


Cardiac:  No


Neurological:  No


Sexually Transmitted Disease:  No


HIV/AIDS:  No


Genitourinary:  No


Gastrointestinal:  Yes


Gastroesophageal Reflux, Chronic Diarrhea


Musculoskeletal:  Yes


Rheumatoid Arthritis


Endocrine:  No


HEENT:  No


Loss of Vision:  Bilateral


Cancer:  No


Psychosocial:  Yes


Depression


Integumentary:  Yes


Eczema


Blood Disorders:  No


Adverse Reaction/Blood Tranf:  No


 (LIZZ HUFFMAN MD)





Family Medical History


No Pertinent Family Hx


 (LIZZ HUFFMAN MD)





Physical Exam


Vital Signs





Vital Signs - First Documented








 2/25/21





 01:55


 


Temp 36.5


 


Pulse 80


 


Resp 18


 


B/P (MAP) 136/68 (90)


 


Pulse Ox 98








 (ELEANORDIOR MED STUDEN)


Vital Signs


Capillary Refill :  


 (LIZZ HUFFMAN MD)


Height, Weight, BMI


Height: 6'1.00"


Weight: 210lbs. 7.0oz. 95.103266az; 30.00 BMI


Method:Stated


 


 (LIZZ HUFFMAN MD)


General Appearance:  No Apparent Distress, WD/WN


Neck:  Normal Inspection, Non Tender


Respiratory:  Chest Non Tender, No Accessory Muscle Use, No Respiratory Distress


Cardiovascular:  Regular Rate, Rhythm, No Edema, Normal Peripheral Pulses


Gastrointestinal:  Non Tender, Soft


Extremity:  Normal Capillary Refill, No Pedal Edema, Other (The left hand had 

decreased mobility with flexion and extention. He had a positive Tinels test. 

His sensory was in tact BL in hands. Strength was 3/5 in the left hand compared 

to the 5/5 on the right. No swelling was noted. Pain seemed to be deeper in the 

joint. )


Neurologic/Psychiatric:  Alert, Oriented x3


Skin:  Normal Color (ELEANOR,DIOR MED STUDEN)





Progress/Results/Core Measures


Suspected Sepsis


SIRS


Temperature: 


Pulse:  


Respiratory Rate: 


 


Blood Pressure  / 


Mean: 


 


 (LIZZ HUFFMAN MD)





Results/Orders


Medications Given in ED





Current Medications








 Medications  Dose


 Ordered  Sig/Jessica


 Route  Start Time


 Stop Time Status Last Admin


Dose Admin


 


 Ketorolac


 Tromethamine  30 mg  ONCE  ONCE


 IM  2/25/21 02:30


 2/25/21 02:31 DC 2/25/21 02:34


30 MG





 (DIOR WEBSTER MED STUDEN)


Vital Signs/I&O











 2/25/21 2/25/21





 01:55 02:45


 


Temp 36.5 


 


Pulse 80 80


 


Resp 18 18


 


B/P (MAP) 136/68 (90) 136/68 (90)


 


Pulse Ox 98 98








 (ELEANORDIOR VEGA MED STUDCOMFORT)


Vital Signs/I&O


Capillary Refill :  


 (LIZZ HUFFMAN MD)


Progress Note :  


Progress Note


This gentleman was seen and evaluated by me personally.  He had tenderness 

directly over the Palmar surface of the left wrist with positive Tinel's sign.  

There are were no inflammatory changes or joint effusions.  It is unclear 

whether this is an arthritic flare or carpal tunnel syndrome.  Patient was 

advised to use a brace.  He is also advised to increase his prednisone dosing.  

He may use either a burst or a short taper.  We discussed how to do this.  An 

injection of Toradol was administered.  I did not advise continued use NSAIDs 

due to potential interaction with methotrexate and long-term stomach irritation.

 Patient was offered a prescription for pain medication but declined.  Patient 

denies any injury to the wrist but he did perform manual duties with his hands 

and arms throughout the workday yesterday.


 (LIZZ HUFFMAN MD)





Departure


Impression





   Primary Impression:  


   Left wrist pain


   Additional Impression:  


   Rheumatoid arthritis


   Qualified Codes:  M06.9 - Rheumatoid arthritis, unspecified


Disposition:  01 HOME, SELF-CARE


Condition:  Improved





Departure-Patient Inst.


Decision time for Depature:  02:21


 (LIZZ HUFFMAN MD)


Referrals:  


ANNY BLACK MD (PCP/Family)


Primary Care Physician


Patient Instructions:  Rheumatoid Arthritis (DC), Carpal Tunnel Syndrome





Add. Discharge Instructions:  


When you return home take an additional 3 or 4 prednisone tablets (30 to 40 mg).

 You may take 40 mg daily for the next 3 or 4 days.  Tylenol (acetaminophen) up 

to 1000 mg every 6 hours may be used for pain.  Icing or ice massage in 20-

minute intervals may be helpful also.  Given the nature of your work, your pain 

it could also be caused by carpal tunnel syndrome.  Using a wrist brace may help

tremendously with carpal tunnel syndrome.  Discuss your wrist pain with your 

rheumatologist and or your primary care provider at your next appointment.  Call

with questions or concerns.  Return to the ER if you have worsening symptoms.














All discharge instructions reviewed with patient and/or family. Voiced 

understanding.


Work/School Note:  Work Release Form   Date Seen in the Emergency Department:  

Feb 25, 2021


   Return to Work:  Feb 26, 2021


      Other Restrictions Listed Below:  May need Friday, February 26 off if 

still in significant pain.











Medical Student Attestation and Attending Note:





I have personally interviewed and examined this patient along with Dior Jones, MS 4. I have reviewed student documentation including history, 

physical, and assessments.  I agree with the documentation except where 

otherwise noted.





Exam:


General: Alert, oriented, no acute distress, well developed


HEENT: Normocephalic and atraumatic


Heart: Regular rate and rhythm without murmur


Lungs: Clear to auscultation bilaterally with normal effort


Extremities: Decreased  strength of the left hand and decreased range of 

motion.  Positive [place] sign and tenderness over the ventral surface of the 

wrist.  No joint effusion or inflammatory changes noted.


Neuropsych: Alert, oriented, no focal deficits


Skin: Warm and dry without rashes l


 (LIZZ HUFFMAN MD)





Copy


Copies To 1:   ANNY BLACK MD, JOSHUA T MD        Feb 25, 2021 02:25


DIOR WEBSTER [facility]    Feb 25, 2021 03:13

## 2023-06-08 ENCOUNTER — HOSPITAL ENCOUNTER (EMERGENCY)
Dept: HOSPITAL 75 - ER | Age: 43
Discharge: HOME | End: 2023-06-08
Payer: COMMERCIAL

## 2023-06-08 VITALS — BODY MASS INDEX: 31.82 KG/M2 | HEIGHT: 73.03 IN | WEIGHT: 240.08 LBS

## 2023-06-08 VITALS — DIASTOLIC BLOOD PRESSURE: 101 MMHG | SYSTOLIC BLOOD PRESSURE: 141 MMHG

## 2023-06-08 DIAGNOSIS — F17.210: ICD-10-CM

## 2023-06-08 DIAGNOSIS — M06.9: Primary | ICD-10-CM

## 2023-06-08 DIAGNOSIS — Z79.620: ICD-10-CM

## 2023-06-08 PROCEDURE — 96372 THER/PROPH/DIAG INJ SC/IM: CPT

## 2023-06-08 PROCEDURE — 99284 EMERGENCY DEPT VISIT MOD MDM: CPT

## 2023-06-08 NOTE — ED UPPER EXTREMITY
General


Chief Complaint:  Upper Extremity


Stated Complaint:  RIGHT HAND PAIN


Nursing Triage Note:  


C/O RIGHT WRIST PAIN SINCE WEDNESDAY. STARTED ORAL STEROIDS 6/4/23 WITHOUT 


IMPROVEMENT.


Source:  patient





History of Present Illness


Date Seen by Provider:  Jun 8, 2023


Time Seen by Provider:  03:49


Initial Comments


PT ARRIVES VIA POV FROM HOME


PT STATES HE IS HAVING AN ARTHRITIS FLARE UP


C/O RIGHT ANTERIOR WRIST PAIN SINCE WEDNESDAY 06/07/23


HE WORKS AT [employer] AND WAS USING A HOLE PUNCH ON TUESDAY AND WEDNESDAY--NORMAL 

ACTIVITY FOR PT


STATES HE STARTED GETTING SORE ON WEDNESDAY





NO INJURY


NO PARESTHESIAS OR MOTOR DEFICITS


NO SWELLING


NO OTHER JOINTS ARE INVOLVED


NO HISTORY OF GOUT OR PSEUDOGOUT





PT IS LEFT HANDED





PT HAS BEEN ON HUMIRA FOR THE LAST YEAR AND HAS HAD AN IMPROVEMENT IN HIS FLARE 

UP'S


STATES HE HAS PREDNISONE 5 MG AT HOME AND TOOK 2 OF THEM AT 0230--NO RELIEF, SO 

CAME HERE. HE STATES HE HAS NOT TAKEN PREDNISONE "FOR YEARS" 


HE HAS NOT TAKEN ANYTHING ELSE FOR PAIN AT ANY TIME. 





HE STATES THIS IS EXACTLY THE SAME AS HIS OTHER R.A. FLARES. 





HE DENIES ANY OTHER MEDICAL PROBLEMS OR ANY OTHER MEDICATIONS





PCP: DR. BLACK


RHEUMATOLOGY: [facility] [place]





Allergies and Home Medications


Allergies


Coded Allergies:  


     No Known Drug Allergies (Unverified , 3/5/18)





Patient Home Medication List


Home Medication List Reviewed:  Yes


Adalimumab (Humira Pen) 40 Mg/0.4 Ml [email or web address], (Reported)


   Entered as Reported by: NIDHI MCQUEEN on 6/8/23 0342


   Last Action: New Order


Prednisone (Prednisone) 5 Mg Tablet, (Reported)


   Entered as Reported by: NIDHI MCQUEEN on 6/8/23 0342


   Last Action: New Order


Prednisone (Prednisone) 20 Mg Tab, 40 MG PO DAILY


   Prescribed by: AUDREY WILBURN on 6/8/23 3217


Discontinued Medications


Prednisone (Prednisone) 20 Mg Tab, 40 MG PO DAILY


   Discontinued Reason: No Longer Taking


   Prescribed by: ZACKARY LUCIA on 12/7/18 6304


   Last Action: Discontinued


Prednisone (Prednisone) 20 Mg Tab, 40 MG PO BID


   Discontinued Reason: No Longer Taking


   Prescribed by: ZACKARY LUCIA on 12/5/19 0501


   Last Action: Discontinued





Review of Systems


Constitutional:  no symptoms reported


Musculoskeletal:  see HPI


Skin:  no symptoms reported


Psychiatric/Neurological:  No Symptoms Reported





Past Medical-Social-Family Hx


Patient Social History


Tobacco Use?:  Yes


Tobacco type used:  Cigarettes


Smoking Status:  Current Everyday Smoker


Substance use?:  No


Alcohol Use?:  No


Pt feels they are or have been:  No





Immunizations Up To Date


First/Initial COVID19 Vaccinat:  NA





Seasonal Allergies


Seasonal Allergies:  No





Past Medical History


Surgery/Hospitalization HX:  


RHEUMATOID ARTHRITIS


Surgeries:  Yes (HERNIA AS BABY)


Abdominal


Respiratory:  No


Cardiac:  No


Neurological:  No


Sexually Transmitted Disease:  No


HIV/AIDS:  No


Genitourinary:  No


Gastrointestinal:  Yes


Gastroesophageal Reflux, Chronic Diarrhea


Musculoskeletal:  Yes


Rheumatoid Arthritis


Endocrine:  No


HEENT:  No


Loss of Vision:  Bilateral


Cancer:  No


Psychosocial:  Yes


Depression


Integumentary:  Yes


Eczema


Blood Disorders:  No


Adverse Reaction/Blood Tranf:  No





Family Medical History


No Pertinent Family Hx





Physical Exam


Vital Signs





Vital Signs - First Documented








 6/8/23





 03:38


 


Temp 36.6


 


Pulse 83


 


Resp 16


 


B/P (MAP) 141/101 (114)


 


Pulse Ox 99


 


O2 Delivery Room Air





Capillary Refill : Less Than 3 Seconds


Height, Weight, BMI


Height: 6'1.00"


Weight: 210lbs. 7.0oz. 95.212158df; 31.00 BMI


Method:Stated


General Appearance:  WD/WN, no apparent distress


HEENT:  other (POOR DENTITION)


Cardiovascular:  regular rate, rhythm, no murmur


Elbow/Forearm:  normal inspection


Wrist:  Yes limited ROM, Yes soft tissue tenderness (ANTERIOR ASPECT OF RIGHT 

WRIST IS TENDER. NO SWELLING, NO ERYTHEMA, NO EXTERNAL EVIDENCE OF TRAUMA. 

SENSORY/VASCULAR INTACT. LIMITED ROM AT WRIST DUE TO PAIN. )


Hand:  normal inspection


Neurologic/Tendon:  normal sensation


Neurologic/Psychiatric:  CNs II-XII nml as tested, alert, normal mood/affect, 

oriented x 3


Skin:  normal color (PT IS BLACK), warm/dry, tattoos/piercings (TATTOOS)





Progress/Results/Core Measures


Results/Orders


My Orders





Orders - AUDREY WILBURN DO


Ketorolac Injection (Toradol Injection) (6/8/23 04:00)





Vital Signs/I&O











 6/8/23





 03:38


 


Temp 36.6


 


Pulse 83


 


Resp 16


 


B/P (MAP) 141/101 (114)


 


Pulse Ox 99


 


O2 Delivery Room Air














Blood Pressure Mean:                    114











Progress


Progress Note :  


Progress Note





GIVEN TORADOL IM





PT REQUESTS RX FOR ADDITIONAL PREDNISONE, STATES THAT HE DOES NOT HAVE ENOUGH AT

HOME TO COMPLETE A 4 DAY COURSE AT 40 MG / DAY





DISCUSSED ANTICIPATED COURSE, SYMPTOMATIC TREATMENT, MEDICATION, NEED FOR FOLLOW

UP AND RETURN PRECAUTIONS





REVIEWED PRIOR RECORDS--ALL ER VISITS AND OUTPATIENT PROCEDURES.





Departure


Impression





   Primary Impression:  


   Right wrist pain


   Additional Impression:  


   Rheumatoid arthritis


Disposition:  01 HOME, SELF-CARE


Condition:  Stable





Departure-Patient Inst.


Decision time for Depature:  03:56


Referrals:  


ANNY BLACK MD (PCP/Family)


Primary Care Physician


Patient Instructions:  Rheumatoid Arthritis (DC)





Add. Discharge Instructions:  


FOLLOW UP WITH DR. BLACK OR YOUR RHEUMATOLOGIST IN 2-3 DAYS IF NO BETTER





All discharge instructions reviewed with patient and/or family. Voiced 

understanding.


Scripts


Prednisone (Prednisone) 20 Mg Tab


40 MG PO DAILY, #8 TAB 0 Refills


   Prov: AUDREY WILBURN DO         6/8/23











AUDREY WILBURN DO                  Jun 8, 2023 03:57